# Patient Record
Sex: MALE | Race: WHITE | NOT HISPANIC OR LATINO | Employment: OTHER | URBAN - NONMETROPOLITAN AREA
[De-identification: names, ages, dates, MRNs, and addresses within clinical notes are randomized per-mention and may not be internally consistent; named-entity substitution may affect disease eponyms.]

---

## 2019-03-12 ENCOUNTER — TRANSCRIBE ORDERS (OUTPATIENT)
Dept: ADMINISTRATIVE | Facility: HOSPITAL | Age: 59
End: 2019-03-12

## 2019-03-19 ENCOUNTER — TRANSCRIBE ORDERS (OUTPATIENT)
Dept: ADMINISTRATIVE | Facility: HOSPITAL | Age: 59
End: 2019-03-19

## 2019-03-19 DIAGNOSIS — I25.10 ATHEROSCLEROSIS OF NATIVE CORONARY ARTERY WITHOUT ANGINA PECTORIS, UNSPECIFIED WHETHER NATIVE OR TRANSPLANTED HEART: Primary | ICD-10-CM

## 2019-03-21 ENCOUNTER — HOSPITAL ENCOUNTER (OUTPATIENT)
Dept: CARDIOLOGY | Facility: HOSPITAL | Age: 59
Discharge: HOME | End: 2019-03-21
Admitting: FAMILY MEDICINE

## 2019-03-21 VITALS — SYSTOLIC BLOOD PRESSURE: 143 MMHG | DIASTOLIC BLOOD PRESSURE: 86 MMHG | HEART RATE: 91 BPM

## 2019-03-21 DIAGNOSIS — I25.10 ATHEROSCLEROSIS OF NATIVE CORONARY ARTERY WITHOUT ANGINA PECTORIS, UNSPECIFIED WHETHER NATIVE OR TRANSPLANTED HEART: ICD-10-CM

## 2019-03-21 LAB
MAXIMAL PREDICTED HEART RATE: 163 BPM
STRESS TARGET HR: 139 BPM

## 2019-03-21 PROCEDURE — 25010000002 PERFLUTREN 6.52 MG/ML SUSPENSION: Performed by: INTERNAL MEDICINE

## 2019-03-21 PROCEDURE — 93350 STRESS TTE ONLY: CPT

## 2019-03-21 PROCEDURE — 93017 CV STRESS TEST TRACING ONLY: CPT

## 2019-03-21 PROCEDURE — 93352 ADMIN ECG CONTRAST AGENT: CPT | Performed by: INTERNAL MEDICINE

## 2019-03-21 PROCEDURE — 93350 STRESS TTE ONLY: CPT | Performed by: INTERNAL MEDICINE

## 2019-03-21 PROCEDURE — 93018 CV STRESS TEST I&R ONLY: CPT | Performed by: INTERNAL MEDICINE

## 2019-03-21 RX ORDER — CARVEDILOL 3.12 MG/1
3.12 TABLET ORAL 2 TIMES DAILY WITH MEALS
COMMUNITY
End: 2021-08-13 | Stop reason: HOSPADM

## 2019-03-21 RX ORDER — RAMIPRIL 10 MG/1
10 CAPSULE ORAL DAILY
COMMUNITY
End: 2021-08-13 | Stop reason: HOSPADM

## 2019-03-21 RX ADMIN — PERFLUTREN 8.48 MG: 6.52 INJECTION, SUSPENSION INTRAVENOUS at 08:25

## 2019-11-08 ENCOUNTER — OFFICE VISIT (OUTPATIENT)
Dept: CARDIOLOGY | Facility: CLINIC | Age: 59
End: 2019-11-08

## 2019-11-08 VITALS
SYSTOLIC BLOOD PRESSURE: 124 MMHG | DIASTOLIC BLOOD PRESSURE: 82 MMHG | BODY MASS INDEX: 27.47 KG/M2 | HEART RATE: 62 BPM | HEIGHT: 67 IN | OXYGEN SATURATION: 98 % | WEIGHT: 175 LBS

## 2019-11-08 DIAGNOSIS — I10 ESSENTIAL HYPERTENSION: ICD-10-CM

## 2019-11-08 DIAGNOSIS — I25.10 CORONARY ARTERY DISEASE INVOLVING NATIVE CORONARY ARTERY OF NATIVE HEART WITHOUT ANGINA PECTORIS: Primary | ICD-10-CM

## 2019-11-08 DIAGNOSIS — E78.2 MIXED HYPERLIPIDEMIA: ICD-10-CM

## 2019-11-08 PROCEDURE — 93000 ELECTROCARDIOGRAM COMPLETE: CPT | Performed by: INTERNAL MEDICINE

## 2019-11-08 PROCEDURE — 99214 OFFICE O/P EST MOD 30 MIN: CPT | Performed by: INTERNAL MEDICINE

## 2019-11-08 RX ORDER — ATORVASTATIN CALCIUM 20 MG/1
20 TABLET, FILM COATED ORAL NIGHTLY
COMMUNITY
End: 2020-08-13 | Stop reason: HOSPADM

## 2019-11-08 RX ORDER — FOLIC ACID/MULTIVIT,IRON,MINER 0.4MG-18MG
2 TABLET ORAL DAILY
COMMUNITY

## 2019-11-08 RX ORDER — VIT C/B6/B5/MAGNESIUM/HERB 173 50-5-6-5MG
4 CAPSULE ORAL DAILY
COMMUNITY

## 2019-11-08 RX ORDER — UREA 10 %
1 LOTION (ML) TOPICAL EVERY 24 HOURS
Status: ON HOLD | COMMUNITY
End: 2021-08-11

## 2019-11-08 NOTE — PROGRESS NOTES
Referring Provider: Logan Bryant MD    Reason for Follow-up Visit: preop clearance    Subjective .   Chief Complaint:   Chief Complaint   Patient presents with   • New/Old Patient     Referred by Dr. Ferrera to get surgical clearance for left hip replacement 12/9/19.  pt had a stress test 3/2019.  Last OV 9/3/2008   • Coronary Artery Disease     pt has a history of stents X4.  he is not having any complaints or symptoms.  just needs to be cleared to have surgery.   • Hyperlipidemia     last labs done at pcp LDL is 171 done 6/11/19  pt is on Lipitor 20 mg qd.       History of present illness:  Logan Mayen is a 59 y.o. yo male with history of CAD, s/p SELMA most recently in 2008. He denies any CP or JOSHUA        History  Past Medical History:   Diagnosis Date   • Coronary artery disease    • Hyperlipidemia    • Hypertension    • Myocardial infarction (CMS/HCC)    ,   Past Surgical History:   Procedure Laterality Date   • CARDIAC CATHETERIZATION     • CATARACT EXTRACTION, BILATERAL     • CORONARY STENT PLACEMENT     • PERIPHERAL ARTERIAL STENT GRAFT     ,   Family History   Problem Relation Age of Onset   • Lung disease Mother    • Heart attack Father    • Heart disease Father    • Lung disease Sister    • Rheum arthritis Sister    • Cancer Sister    • Rheum arthritis Sister    • Liver disease Sister    ,   Social History     Tobacco Use   • Smoking status: Never Smoker   • Smokeless tobacco: Never Used   Substance Use Topics   • Alcohol use: Yes     Alcohol/week: 3.6 oz     Types: 6 Cans of beer per week     Comment: 2-3 x a week   • Drug use: Yes     Types: Marijuana     Comment: sometimes   ,     Medications  Current Outpatient Medications   Medication Sig Dispense Refill   • aspirin 81 MG chewable tablet Chew 81 mg Daily.     • atorvastatin (LIPITOR) 20 MG tablet Take 20 mg by mouth Every Night.     • carvedilol (COREG) 3.125 MG tablet Take 3.125 mg by mouth 2 (Two) Times a Day With Meals.     •  "Cholecalciferol (VITAMIN D3) 125 MCG (5000 UT) capsule capsule Take 5,000 Units by mouth Daily.     • folic acid (FOLVITE) 800 MCG tablet Take 1 tablet by mouth Daily.     • Krill Oil 350 MG capsule Take 2 tablets by mouth Daily.     • ramipril (ALTACE) 10 MG capsule Take 10 mg by mouth Daily.     • Sildenafil Citrate (VIAGRA PO) Take 1 tablet by mouth As Needed.     • Turmeric 500 MG capsule Take 2 tablets by mouth Daily.       No current facility-administered medications for this visit.        Allergies:  Patient has no known allergies.    Review of Systems  Review of Systems   HENT: Negative for nosebleeds.    Cardiovascular: Negative for chest pain, claudication, dyspnea on exertion, irregular heartbeat, leg swelling, near-syncope, orthopnea, palpitations, paroxysmal nocturnal dyspnea and syncope.   Respiratory: Negative for cough, hemoptysis and shortness of breath.    Musculoskeletal: Positive for joint pain (left hip pain).   Gastrointestinal: Negative for dysphagia, hematemesis and melena.   Genitourinary: Negative for hematuria.   All other systems reviewed and are negative.      Objective     Physical Exam:  /82   Pulse 62   Ht 170.2 cm (67\")   Wt 79.4 kg (175 lb)   SpO2 98%   BMI 27.41 kg/m²   Physical Exam   Constitutional: He is oriented to person, place, and time. He appears well-nourished. No distress.   HENT:   Head: Normocephalic.   Eyes: No scleral icterus.   Neck: Normal range of motion. Neck supple.   Cardiovascular: Normal rate, regular rhythm and normal heart sounds. Exam reveals no gallop and no friction rub.   No murmur heard.  Pulmonary/Chest: Effort normal and breath sounds normal. No respiratory distress. He has no wheezes. He has no rales.   Abdominal: Soft. Bowel sounds are normal. He exhibits no distension. There is no tenderness.   Musculoskeletal: He exhibits no edema.   Neurological: He is alert and oriented to person, place, and time.   Skin: Skin is warm and dry. He is " not diaphoretic. No erythema.   Psychiatric: He has a normal mood and affect. His behavior is normal.       Results Review:    ECG 12 Lead  Date/Time: 11/8/2019 10:07 AM  Performed by: Logan Jj MD  Authorized by: Logan Jj MD   Comparison: compared with previous ECG   Similar to previous ECG  Rhythm: sinus rhythm  Rate: normal  Conduction: conduction normal  ST Segments: ST segments normal  T inversion: II  QRS axis: normal  Other findings: non-specific ST-T wave changes    Clinical impression: non-specific ECG            Hospital Outpatient Visit on 03/21/2019   Component Date Value Ref Range Status   • Target HR (85%) 03/21/2019 139  bpm Final   • Max. Pred. HR (100%) 03/21/2019 163  bpm Final       Assessment/Plan   Logan was seen today for new/old patient, coronary artery disease and hyperlipidemia.    Diagnoses and all orders for this visit:    Mixed hyperlipidemia, has inadequate response to statin and probably has familial hyperlipidemia. Will start a PCSK9i    Coronary artery disease involving native coronary artery of native heart without angina pectoris, The patient denies chest pain, shortness of breath, dyspnea on exertion, orthopnea, PND, edema. There is no evidence of ongoing ischemia. Had a normal stress test earlier this year. Low risk for surgery. Will need to cont asa during surgery with previous stents      Essential hypertension, good control        Patient's Body mass index is 27.41 kg/m². BMI is within normal parameters. No follow-up required..

## 2019-11-12 ENCOUNTER — DOCUMENTATION (OUTPATIENT)
Dept: CARDIOLOGY | Facility: CLINIC | Age: 59
End: 2019-11-12

## 2019-11-12 NOTE — TELEPHONE ENCOUNTER
I called pt to let him know we faxed the surgical clearance to Dr. Ferrera.  I also talked to him about the PCSK9i (Repatha) that Dr. Jj wants to start because pt is intolerant to the statins and stopped taking the statins. Pt wants to Google the Repatha.  A Rx will be sent to his local pharmacy and a PA has been done.  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  Abhinav Geller, CMA

## 2019-11-21 ENCOUNTER — HOSPITAL ENCOUNTER (OUTPATIENT)
Dept: PREADMISSION TESTING | Age: 59
Discharge: HOME | DRG: 470 | End: 2019-11-25
Payer: COMMERCIAL

## 2019-11-21 VITALS — WEIGHT: 170 LBS | BODY MASS INDEX: 26.68 KG/M2 | HEIGHT: 67 IN

## 2019-11-21 LAB
ABO/RH: NORMAL
ANTIBODY SCREEN: NORMAL

## 2019-11-21 PROCEDURE — 86850 RBC ANTIBODY SCREEN: CPT

## 2019-11-21 PROCEDURE — 86901 BLOOD TYPING SEROLOGIC RH(D): CPT

## 2019-11-21 PROCEDURE — 86900 BLOOD TYPING SEROLOGIC ABO: CPT

## 2019-11-21 RX ORDER — ACETAMINOPHEN 500 MG
1000 TABLET ORAL ONCE
Status: CANCELLED | OUTPATIENT
Start: 2019-12-09

## 2019-11-21 RX ORDER — LANOLIN ALCOHOL/MO/W.PET/CERES
400 CREAM (GRAM) TOPICAL DAILY
COMMUNITY

## 2019-11-21 RX ORDER — DEXAMETHASONE SODIUM PHOSPHATE 10 MG/ML
10 INJECTION, SOLUTION INTRAMUSCULAR; INTRAVENOUS ONCE
Status: CANCELLED | OUTPATIENT
Start: 2019-12-09

## 2019-11-21 RX ORDER — PREGABALIN 75 MG/1
75 CAPSULE ORAL ONCE
Status: CANCELLED | OUTPATIENT
Start: 2019-12-09

## 2019-11-21 RX ORDER — TURMERIC ROOT EXTRACT 500 MG
2 TABLET ORAL DAILY
COMMUNITY

## 2019-11-21 RX ORDER — CELECOXIB 200 MG/1
200 CAPSULE ORAL ONCE
Status: CANCELLED | OUTPATIENT
Start: 2019-12-09

## 2019-11-21 RX ORDER — CARVEDILOL 3.12 MG/1
3.12 TABLET ORAL 2 TIMES DAILY WITH MEALS
COMMUNITY

## 2019-11-21 RX ORDER — OXYCODONE HCL 10 MG/1
10 TABLET, FILM COATED, EXTENDED RELEASE ORAL ONCE
Status: CANCELLED | OUTPATIENT
Start: 2019-12-09

## 2019-11-21 RX ORDER — RAMIPRIL 10 MG/1
10 CAPSULE ORAL DAILY
COMMUNITY

## 2019-11-21 RX ORDER — BIOTIN 5 MG
1 TABLET ORAL DAILY
COMMUNITY

## 2019-11-21 RX ORDER — ATORVASTATIN CALCIUM 20 MG/1
20 TABLET, FILM COATED ORAL NIGHTLY
COMMUNITY

## 2019-12-08 PROBLEM — M16.12 PRIMARY OSTEOARTHRITIS OF LEFT HIP: Status: ACTIVE | Noted: 2019-12-08

## 2019-12-09 ENCOUNTER — ANESTHESIA (OUTPATIENT)
Dept: OPERATING ROOM | Age: 59
DRG: 470 | End: 2019-12-09
Payer: COMMERCIAL

## 2019-12-09 ENCOUNTER — ANESTHESIA EVENT (OUTPATIENT)
Dept: OPERATING ROOM | Age: 59
DRG: 470 | End: 2019-12-09
Payer: COMMERCIAL

## 2019-12-09 ENCOUNTER — APPOINTMENT (OUTPATIENT)
Dept: GENERAL RADIOLOGY | Age: 59
DRG: 470 | End: 2019-12-09
Attending: ORTHOPAEDIC SURGERY
Payer: COMMERCIAL

## 2019-12-09 ENCOUNTER — HOSPITAL ENCOUNTER (INPATIENT)
Age: 59
LOS: 2 days | Discharge: HOME | DRG: 470 | End: 2019-12-11
Attending: ORTHOPAEDIC SURGERY | Admitting: ORTHOPAEDIC SURGERY
Payer: COMMERCIAL

## 2019-12-09 VITALS
OXYGEN SATURATION: 100 % | DIASTOLIC BLOOD PRESSURE: 53 MMHG | SYSTOLIC BLOOD PRESSURE: 97 MMHG | TEMPERATURE: 97.4 F | RESPIRATION RATE: 1 BRPM

## 2019-12-09 DIAGNOSIS — M16.12 PRIMARY OSTEOARTHRITIS OF LEFT HIP: Primary | ICD-10-CM

## 2019-12-09 PROBLEM — M16.9 DEGENERATIVE JOINT DISEASE (DJD) OF HIP: Status: ACTIVE | Noted: 2019-12-09

## 2019-12-09 LAB
ABO/RH: NORMAL
ANTIBODY SCREEN: NORMAL

## 2019-12-09 PROCEDURE — 2500000003 HC RX 250 WO HCPCS: Performed by: ORTHOPAEDIC SURGERY

## 2019-12-09 PROCEDURE — 3700000001 HC ADD 15 MINUTES (ANESTHESIA): Performed by: ORTHOPAEDIC SURGERY

## 2019-12-09 PROCEDURE — 86900 BLOOD TYPING SEROLOGIC ABO: CPT

## 2019-12-09 PROCEDURE — 2500000003 HC RX 250 WO HCPCS

## 2019-12-09 PROCEDURE — 3600000005 HC SURGERY LEVEL 5 BASE: Performed by: ORTHOPAEDIC SURGERY

## 2019-12-09 PROCEDURE — 73502 X-RAY EXAM HIP UNI 2-3 VIEWS: CPT

## 2019-12-09 PROCEDURE — 6370000000 HC RX 637 (ALT 250 FOR IP): Performed by: ORTHOPAEDIC SURGERY

## 2019-12-09 PROCEDURE — 2709999900 HC NON-CHARGEABLE SUPPLY: Performed by: ORTHOPAEDIC SURGERY

## 2019-12-09 PROCEDURE — 1210000000 HC MED SURG R&B

## 2019-12-09 PROCEDURE — 6360000002 HC RX W HCPCS

## 2019-12-09 PROCEDURE — 86850 RBC ANTIBODY SCREEN: CPT

## 2019-12-09 PROCEDURE — 0SRB04A REPLACEMENT OF LEFT HIP JOINT WITH CERAMIC ON POLYETHYLENE SYNTHETIC SUBSTITUTE, UNCEMENTED, OPEN APPROACH: ICD-10-PCS | Performed by: ORTHOPAEDIC SURGERY

## 2019-12-09 PROCEDURE — C9290 INJ, BUPIVACAINE LIPOSOME: HCPCS | Performed by: ORTHOPAEDIC SURGERY

## 2019-12-09 PROCEDURE — 6360000002 HC RX W HCPCS: Performed by: ORTHOPAEDIC SURGERY

## 2019-12-09 PROCEDURE — 3700000000 HC ANESTHESIA ATTENDED CARE: Performed by: ORTHOPAEDIC SURGERY

## 2019-12-09 PROCEDURE — 7100000001 HC PACU RECOVERY - ADDTL 15 MIN: Performed by: ORTHOPAEDIC SURGERY

## 2019-12-09 PROCEDURE — C1776 JOINT DEVICE (IMPLANTABLE): HCPCS | Performed by: ORTHOPAEDIC SURGERY

## 2019-12-09 PROCEDURE — C1713 ANCHOR/SCREW BN/BN,TIS/BN: HCPCS | Performed by: ORTHOPAEDIC SURGERY

## 2019-12-09 PROCEDURE — 2580000003 HC RX 258: Performed by: ANESTHESIOLOGY

## 2019-12-09 PROCEDURE — 97530 THERAPEUTIC ACTIVITIES: CPT

## 2019-12-09 PROCEDURE — 97161 PT EVAL LOW COMPLEX 20 MIN: CPT

## 2019-12-09 PROCEDURE — 7100000000 HC PACU RECOVERY - FIRST 15 MIN: Performed by: ORTHOPAEDIC SURGERY

## 2019-12-09 PROCEDURE — 2720000010 HC SURG SUPPLY STERILE: Performed by: ORTHOPAEDIC SURGERY

## 2019-12-09 PROCEDURE — 3209999900 FLUORO FOR SURGICAL PROCEDURES

## 2019-12-09 PROCEDURE — 36415 COLL VENOUS BLD VENIPUNCTURE: CPT

## 2019-12-09 PROCEDURE — 3600000015 HC SURGERY LEVEL 5 ADDTL 15MIN: Performed by: ORTHOPAEDIC SURGERY

## 2019-12-09 PROCEDURE — 86901 BLOOD TYPING SEROLOGIC RH(D): CPT

## 2019-12-09 PROCEDURE — 2580000003 HC RX 258: Performed by: ORTHOPAEDIC SURGERY

## 2019-12-09 DEVICE — SCREW BNE L30MM DIA6.5MM CANC HIP S STL GRIPTION FULL THRD: Type: IMPLANTABLE DEVICE | Site: HIP | Status: FUNCTIONAL

## 2019-12-09 DEVICE — CUP ACET DIA54MM 12 H HIP TI GRIPTION VIP TAPR DOME REV: Type: IMPLANTABLE DEVICE | Site: HIP | Status: FUNCTIONAL

## 2019-12-09 DEVICE — STEM FEM SZ 6 L107MM 12/14 TAPR STD OFFSET HIP DUOFIX CLLRD: Type: IMPLANTABLE DEVICE | Site: HIP | Status: FUNCTIONAL

## 2019-12-09 DEVICE — HEAD FEM DIA36MM +8MM OFFSET 12/14 TAPR HIP CERAMIC BIOLOX: Type: IMPLANTABLE DEVICE | Site: HIP | Status: FUNCTIONAL

## 2019-12-09 DEVICE — LINER ACET OD54MM ID36MM HIP ALTRX PINN: Type: IMPLANTABLE DEVICE | Site: HIP | Status: FUNCTIONAL

## 2019-12-09 RX ORDER — FENTANYL CITRATE 50 UG/ML
25 INJECTION, SOLUTION INTRAMUSCULAR; INTRAVENOUS
Status: DISCONTINUED | OUTPATIENT
Start: 2019-12-09 | End: 2019-12-09 | Stop reason: HOSPADM

## 2019-12-09 RX ORDER — MIDAZOLAM HYDROCHLORIDE 1 MG/ML
2 INJECTION INTRAMUSCULAR; INTRAVENOUS
Status: DISCONTINUED | OUTPATIENT
Start: 2019-12-09 | End: 2019-12-09 | Stop reason: HOSPADM

## 2019-12-09 RX ORDER — ENALAPRILAT 2.5 MG/2ML
1.25 INJECTION INTRAVENOUS
Status: DISCONTINUED | OUTPATIENT
Start: 2019-12-09 | End: 2019-12-09 | Stop reason: HOSPADM

## 2019-12-09 RX ORDER — SODIUM CHLORIDE 0.9 % (FLUSH) 0.9 %
10 SYRINGE (ML) INJECTION EVERY 12 HOURS SCHEDULED
Status: DISCONTINUED | OUTPATIENT
Start: 2019-12-09 | End: 2019-12-11 | Stop reason: HOSPADM

## 2019-12-09 RX ORDER — ASPIRIN 81 MG/1
81 TABLET, CHEWABLE ORAL NIGHTLY
Status: DISCONTINUED | OUTPATIENT
Start: 2019-12-09 | End: 2019-12-11 | Stop reason: HOSPADM

## 2019-12-09 RX ORDER — METOCLOPRAMIDE HYDROCHLORIDE 5 MG/ML
10 INJECTION INTRAMUSCULAR; INTRAVENOUS
Status: DISCONTINUED | OUTPATIENT
Start: 2019-12-09 | End: 2019-12-09 | Stop reason: HOSPADM

## 2019-12-09 RX ORDER — ONDANSETRON 2 MG/ML
4 INJECTION INTRAMUSCULAR; INTRAVENOUS EVERY 6 HOURS PRN
Status: DISCONTINUED | OUTPATIENT
Start: 2019-12-09 | End: 2019-12-11 | Stop reason: HOSPADM

## 2019-12-09 RX ORDER — OXYCODONE HCL 10 MG/1
10 TABLET, FILM COATED, EXTENDED RELEASE ORAL EVERY 12 HOURS SCHEDULED
Status: DISCONTINUED | OUTPATIENT
Start: 2019-12-09 | End: 2019-12-11 | Stop reason: HOSPADM

## 2019-12-09 RX ORDER — HYDRALAZINE HYDROCHLORIDE 20 MG/ML
5 INJECTION INTRAMUSCULAR; INTRAVENOUS EVERY 10 MIN PRN
Status: DISCONTINUED | OUTPATIENT
Start: 2019-12-09 | End: 2019-12-09 | Stop reason: HOSPADM

## 2019-12-09 RX ORDER — LABETALOL 20 MG/4 ML (5 MG/ML) INTRAVENOUS SYRINGE
5 EVERY 10 MIN PRN
Status: DISCONTINUED | OUTPATIENT
Start: 2019-12-09 | End: 2019-12-09 | Stop reason: HOSPADM

## 2019-12-09 RX ORDER — SODIUM CHLORIDE, SODIUM LACTATE, POTASSIUM CHLORIDE, CALCIUM CHLORIDE 600; 310; 30; 20 MG/100ML; MG/100ML; MG/100ML; MG/100ML
INJECTION, SOLUTION INTRAVENOUS CONTINUOUS
Status: DISCONTINUED | OUTPATIENT
Start: 2019-12-09 | End: 2019-12-11 | Stop reason: HOSPADM

## 2019-12-09 RX ORDER — PREGABALIN 75 MG/1
75 CAPSULE ORAL ONCE
Status: COMPLETED | OUTPATIENT
Start: 2019-12-09 | End: 2019-12-09

## 2019-12-09 RX ORDER — SODIUM CHLORIDE 0.9 % (FLUSH) 0.9 %
10 SYRINGE (ML) INJECTION PRN
Status: DISCONTINUED | OUTPATIENT
Start: 2019-12-09 | End: 2019-12-11 | Stop reason: HOSPADM

## 2019-12-09 RX ORDER — DOCUSATE SODIUM 100 MG/1
100 CAPSULE, LIQUID FILLED ORAL 2 TIMES DAILY
Status: DISCONTINUED | OUTPATIENT
Start: 2019-12-09 | End: 2019-12-11 | Stop reason: HOSPADM

## 2019-12-09 RX ORDER — ACETAMINOPHEN 500 MG
1000 TABLET ORAL ONCE
Status: COMPLETED | OUTPATIENT
Start: 2019-12-09 | End: 2019-12-09

## 2019-12-09 RX ORDER — SODIUM CHLORIDE 0.9 % (FLUSH) 0.9 %
10 SYRINGE (ML) INJECTION EVERY 12 HOURS SCHEDULED
Status: DISCONTINUED | OUTPATIENT
Start: 2019-12-09 | End: 2019-12-09 | Stop reason: HOSPADM

## 2019-12-09 RX ORDER — CARVEDILOL 3.12 MG/1
3.12 TABLET ORAL 2 TIMES DAILY WITH MEALS
Status: DISCONTINUED | OUTPATIENT
Start: 2019-12-09 | End: 2019-12-11 | Stop reason: HOSPADM

## 2019-12-09 RX ORDER — MORPHINE SULFATE 4 MG/ML
2 INJECTION, SOLUTION INTRAMUSCULAR; INTRAVENOUS EVERY 5 MIN PRN
Status: DISCONTINUED | OUTPATIENT
Start: 2019-12-09 | End: 2019-12-09 | Stop reason: HOSPADM

## 2019-12-09 RX ORDER — DEXAMETHASONE SODIUM PHOSPHATE 10 MG/ML
INJECTION INTRAMUSCULAR; INTRAVENOUS PRN
Status: DISCONTINUED | OUTPATIENT
Start: 2019-12-09 | End: 2019-12-09 | Stop reason: SDUPTHER

## 2019-12-09 RX ORDER — MIDAZOLAM HYDROCHLORIDE 1 MG/ML
INJECTION INTRAMUSCULAR; INTRAVENOUS PRN
Status: DISCONTINUED | OUTPATIENT
Start: 2019-12-09 | End: 2019-12-09 | Stop reason: SDUPTHER

## 2019-12-09 RX ORDER — TAMSULOSIN HYDROCHLORIDE 0.4 MG/1
0.4 CAPSULE ORAL DAILY
Status: DISCONTINUED | OUTPATIENT
Start: 2019-12-09 | End: 2019-12-11 | Stop reason: HOSPADM

## 2019-12-09 RX ORDER — EPHEDRINE SULFATE 50 MG/ML
INJECTION, SOLUTION INTRAVENOUS PRN
Status: DISCONTINUED | OUTPATIENT
Start: 2019-12-09 | End: 2019-12-09 | Stop reason: SDUPTHER

## 2019-12-09 RX ORDER — DIAZEPAM 5 MG/1
5 TABLET ORAL EVERY 6 HOURS PRN
Status: DISCONTINUED | OUTPATIENT
Start: 2019-12-09 | End: 2019-12-11 | Stop reason: HOSPADM

## 2019-12-09 RX ORDER — PROPOFOL 10 MG/ML
INJECTION, EMULSION INTRAVENOUS CONTINUOUS PRN
Status: DISCONTINUED | OUTPATIENT
Start: 2019-12-09 | End: 2019-12-09 | Stop reason: SDUPTHER

## 2019-12-09 RX ORDER — ACETAMINOPHEN 325 MG/1
650 TABLET ORAL EVERY 6 HOURS
Status: DISCONTINUED | OUTPATIENT
Start: 2019-12-09 | End: 2019-12-11 | Stop reason: HOSPADM

## 2019-12-09 RX ORDER — MEPERIDINE HYDROCHLORIDE 50 MG/ML
12.5 INJECTION INTRAMUSCULAR; INTRAVENOUS; SUBCUTANEOUS EVERY 5 MIN PRN
Status: DISCONTINUED | OUTPATIENT
Start: 2019-12-09 | End: 2019-12-09 | Stop reason: HOSPADM

## 2019-12-09 RX ORDER — OXYCODONE HYDROCHLORIDE 5 MG/1
10 TABLET ORAL EVERY 4 HOURS PRN
Status: DISCONTINUED | OUTPATIENT
Start: 2019-12-09 | End: 2019-12-11 | Stop reason: HOSPADM

## 2019-12-09 RX ORDER — LIDOCAINE HYDROCHLORIDE 10 MG/ML
1 INJECTION, SOLUTION EPIDURAL; INFILTRATION; INTRACAUDAL; PERINEURAL
Status: DISCONTINUED | OUTPATIENT
Start: 2019-12-09 | End: 2019-12-09 | Stop reason: HOSPADM

## 2019-12-09 RX ORDER — CELECOXIB 200 MG/1
200 CAPSULE ORAL ONCE
Status: COMPLETED | OUTPATIENT
Start: 2019-12-09 | End: 2019-12-09

## 2019-12-09 RX ORDER — SODIUM CHLORIDE 9 MG/ML
INJECTION, SOLUTION INTRAVENOUS CONTINUOUS
Status: DISCONTINUED | OUTPATIENT
Start: 2019-12-09 | End: 2019-12-11 | Stop reason: HOSPADM

## 2019-12-09 RX ORDER — 0.9 % SODIUM CHLORIDE 0.9 %
500 INTRAVENOUS SOLUTION INTRAVENOUS PRN
Status: COMPLETED | OUTPATIENT
Start: 2019-12-09 | End: 2019-12-10

## 2019-12-09 RX ORDER — LIDOCAINE HYDROCHLORIDE 10 MG/ML
INJECTION, SOLUTION EPIDURAL; INFILTRATION; INTRACAUDAL; PERINEURAL PRN
Status: DISCONTINUED | OUTPATIENT
Start: 2019-12-09 | End: 2019-12-09 | Stop reason: SDUPTHER

## 2019-12-09 RX ORDER — BUPIVACAINE HYDROCHLORIDE 7.5 MG/ML
INJECTION, SOLUTION INTRASPINAL PRN
Status: DISCONTINUED | OUTPATIENT
Start: 2019-12-09 | End: 2019-12-09 | Stop reason: SDUPTHER

## 2019-12-09 RX ORDER — PROMETHAZINE HYDROCHLORIDE 25 MG/ML
6.25 INJECTION, SOLUTION INTRAMUSCULAR; INTRAVENOUS
Status: DISCONTINUED | OUTPATIENT
Start: 2019-12-09 | End: 2019-12-09 | Stop reason: HOSPADM

## 2019-12-09 RX ORDER — OXYCODONE HYDROCHLORIDE 5 MG/1
20 TABLET ORAL EVERY 4 HOURS PRN
Status: DISCONTINUED | OUTPATIENT
Start: 2019-12-09 | End: 2019-12-11 | Stop reason: HOSPADM

## 2019-12-09 RX ORDER — ATORVASTATIN CALCIUM 20 MG/1
20 TABLET, FILM COATED ORAL NIGHTLY
Status: DISCONTINUED | OUTPATIENT
Start: 2019-12-09 | End: 2019-12-11 | Stop reason: HOSPADM

## 2019-12-09 RX ORDER — FENTANYL CITRATE 50 UG/ML
INJECTION, SOLUTION INTRAMUSCULAR; INTRAVENOUS PRN
Status: DISCONTINUED | OUTPATIENT
Start: 2019-12-09 | End: 2019-12-09 | Stop reason: SDUPTHER

## 2019-12-09 RX ORDER — TRAMADOL HYDROCHLORIDE 50 MG/1
100 TABLET ORAL EVERY 6 HOURS
Status: DISCONTINUED | OUTPATIENT
Start: 2019-12-09 | End: 2019-12-11 | Stop reason: HOSPADM

## 2019-12-09 RX ORDER — TRANEXAMIC ACID 100 MG/ML
INJECTION, SOLUTION INTRAVENOUS PRN
Status: DISCONTINUED | OUTPATIENT
Start: 2019-12-09 | End: 2019-12-09 | Stop reason: SDUPTHER

## 2019-12-09 RX ORDER — DEXAMETHASONE SODIUM PHOSPHATE 10 MG/ML
10 INJECTION, SOLUTION INTRAMUSCULAR; INTRAVENOUS ONCE
Status: DISCONTINUED | OUTPATIENT
Start: 2019-12-09 | End: 2019-12-09 | Stop reason: HOSPADM

## 2019-12-09 RX ORDER — DIPHENHYDRAMINE HYDROCHLORIDE 50 MG/ML
12.5 INJECTION INTRAMUSCULAR; INTRAVENOUS
Status: DISCONTINUED | OUTPATIENT
Start: 2019-12-09 | End: 2019-12-09 | Stop reason: HOSPADM

## 2019-12-09 RX ORDER — BUPIVACAINE HYDROCHLORIDE 2.5 MG/ML
INJECTION, SOLUTION INFILTRATION; PERINEURAL PRN
Status: DISCONTINUED | OUTPATIENT
Start: 2019-12-09 | End: 2019-12-09 | Stop reason: HOSPADM

## 2019-12-09 RX ORDER — DIPHENHYDRAMINE HCL 25 MG
25 TABLET ORAL EVERY 6 HOURS PRN
Status: DISCONTINUED | OUTPATIENT
Start: 2019-12-09 | End: 2019-12-11 | Stop reason: HOSPADM

## 2019-12-09 RX ORDER — MORPHINE SULFATE 4 MG/ML
4 INJECTION, SOLUTION INTRAMUSCULAR; INTRAVENOUS EVERY 5 MIN PRN
Status: DISCONTINUED | OUTPATIENT
Start: 2019-12-09 | End: 2019-12-09 | Stop reason: HOSPADM

## 2019-12-09 RX ORDER — OXYCODONE HCL 10 MG/1
10 TABLET, FILM COATED, EXTENDED RELEASE ORAL ONCE
Status: COMPLETED | OUTPATIENT
Start: 2019-12-09 | End: 2019-12-09

## 2019-12-09 RX ORDER — FENTANYL CITRATE 50 UG/ML
50 INJECTION, SOLUTION INTRAMUSCULAR; INTRAVENOUS
Status: DISCONTINUED | OUTPATIENT
Start: 2019-12-09 | End: 2019-12-09 | Stop reason: HOSPADM

## 2019-12-09 RX ORDER — SODIUM CHLORIDE, SODIUM LACTATE, POTASSIUM CHLORIDE, CALCIUM CHLORIDE 600; 310; 30; 20 MG/100ML; MG/100ML; MG/100ML; MG/100ML
INJECTION, SOLUTION INTRAVENOUS CONTINUOUS
Status: DISCONTINUED | OUTPATIENT
Start: 2019-12-09 | End: 2019-12-09 | Stop reason: SDUPTHER

## 2019-12-09 RX ORDER — SODIUM CHLORIDE 0.9 % (FLUSH) 0.9 %
10 SYRINGE (ML) INJECTION PRN
Status: DISCONTINUED | OUTPATIENT
Start: 2019-12-09 | End: 2019-12-09 | Stop reason: HOSPADM

## 2019-12-09 RX ORDER — LANOLIN ALCOHOL/MO/W.PET/CERES
400 CREAM (GRAM) TOPICAL DAILY
Status: DISCONTINUED | OUTPATIENT
Start: 2019-12-09 | End: 2019-12-11 | Stop reason: HOSPADM

## 2019-12-09 RX ORDER — SENNA AND DOCUSATE SODIUM 50; 8.6 MG/1; MG/1
1 TABLET, FILM COATED ORAL 2 TIMES DAILY
Status: DISCONTINUED | OUTPATIENT
Start: 2019-12-09 | End: 2019-12-11 | Stop reason: HOSPADM

## 2019-12-09 RX ORDER — ONDANSETRON 2 MG/ML
INJECTION INTRAMUSCULAR; INTRAVENOUS PRN
Status: DISCONTINUED | OUTPATIENT
Start: 2019-12-09 | End: 2019-12-09 | Stop reason: SDUPTHER

## 2019-12-09 RX ORDER — CLINDAMYCIN PHOSPHATE 900 MG/50ML
900 INJECTION INTRAVENOUS EVERY 8 HOURS
Status: COMPLETED | OUTPATIENT
Start: 2019-12-09 | End: 2019-12-11

## 2019-12-09 RX ADMIN — FENTANYL CITRATE 50 MCG: 50 INJECTION INTRAMUSCULAR; INTRAVENOUS at 12:01

## 2019-12-09 RX ADMIN — CARVEDILOL 3.12 MG: 3.12 TABLET, FILM COATED ORAL at 18:33

## 2019-12-09 RX ADMIN — OXYCODONE HYDROCHLORIDE 10 MG: 10 TABLET, FILM COATED, EXTENDED RELEASE ORAL at 10:45

## 2019-12-09 RX ADMIN — OXYCODONE HYDROCHLORIDE 10 MG: 10 TABLET, FILM COATED, EXTENDED RELEASE ORAL at 21:13

## 2019-12-09 RX ADMIN — Medication 10 ML: at 21:14

## 2019-12-09 RX ADMIN — DEXAMETHASONE SODIUM PHOSPHATE 10 MG: 10 INJECTION INTRAMUSCULAR; INTRAVENOUS at 12:16

## 2019-12-09 RX ADMIN — TRANEXAMIC ACID 1000 MG: 100 INJECTION, SOLUTION INTRAVENOUS at 12:03

## 2019-12-09 RX ADMIN — ACETAMINOPHEN 1000 MG: 500 TABLET, FILM COATED ORAL at 10:44

## 2019-12-09 RX ADMIN — CELECOXIB 200 MG: 200 CAPSULE ORAL at 10:45

## 2019-12-09 RX ADMIN — BUPIVACAINE HYDROCHLORIDE IN DEXTROSE 2 ML: 7.5 INJECTION, SOLUTION SUBARACHNOID at 11:55

## 2019-12-09 RX ADMIN — FENTANYL CITRATE 50 MCG: 50 INJECTION INTRAMUSCULAR; INTRAVENOUS at 13:00

## 2019-12-09 RX ADMIN — ASPIRIN 81 MG 81 MG: 81 TABLET ORAL at 21:13

## 2019-12-09 RX ADMIN — ACETAMINOPHEN 650 MG: 325 TABLET ORAL at 21:13

## 2019-12-09 RX ADMIN — ATORVASTATIN CALCIUM 20 MG: 20 TABLET, FILM COATED ORAL at 21:13

## 2019-12-09 RX ADMIN — SODIUM CHLORIDE, SODIUM LACTATE, POTASSIUM CHLORIDE, AND CALCIUM CHLORIDE: 600; 310; 30; 20 INJECTION, SOLUTION INTRAVENOUS at 10:45

## 2019-12-09 RX ADMIN — RIVAROXABAN 10 MG: 10 TABLET, FILM COATED ORAL at 23:24

## 2019-12-09 RX ADMIN — TRAMADOL HYDROCHLORIDE 100 MG: 50 TABLET, FILM COATED ORAL at 15:30

## 2019-12-09 RX ADMIN — Medication 2 G: at 19:33

## 2019-12-09 RX ADMIN — MIDAZOLAM 2 MG: 1 INJECTION INTRAMUSCULAR; INTRAVENOUS at 11:57

## 2019-12-09 RX ADMIN — FENTANYL CITRATE 100 MCG: 50 INJECTION INTRAMUSCULAR; INTRAVENOUS at 13:45

## 2019-12-09 RX ADMIN — TRAMADOL HYDROCHLORIDE 100 MG: 50 TABLET, FILM COATED ORAL at 21:13

## 2019-12-09 RX ADMIN — PROPOFOL 100 MCG/KG/MIN: 10 INJECTION, EMULSION INTRAVENOUS at 11:59

## 2019-12-09 RX ADMIN — DOCUSATE SODIUM 100 MG: 100 CAPSULE, LIQUID FILLED ORAL at 21:13

## 2019-12-09 RX ADMIN — PREGABALIN 75 MG: 75 CAPSULE ORAL at 10:44

## 2019-12-09 RX ADMIN — ONDANSETRON HYDROCHLORIDE 8 MG: 2 INJECTION, SOLUTION INTRAMUSCULAR; INTRAVENOUS at 11:42

## 2019-12-09 RX ADMIN — FENTANYL CITRATE 50 MCG: 50 INJECTION INTRAMUSCULAR; INTRAVENOUS at 12:15

## 2019-12-09 RX ADMIN — TAMSULOSIN HYDROCHLORIDE 0.4 MG: 0.4 CAPSULE ORAL at 15:30

## 2019-12-09 RX ADMIN — CLINDAMYCIN PHOSPHATE 900 MG: 900 INJECTION, SOLUTION INTRAVENOUS at 15:30

## 2019-12-09 RX ADMIN — SODIUM CHLORIDE: 9 INJECTION, SOLUTION INTRAVENOUS at 19:36

## 2019-12-09 RX ADMIN — SODIUM CHLORIDE, SODIUM LACTATE, POTASSIUM CHLORIDE, AND CALCIUM CHLORIDE: 600; 310; 30; 20 INJECTION, SOLUTION INTRAVENOUS at 12:55

## 2019-12-09 RX ADMIN — LIDOCAINE HYDROCHLORIDE 3 ML: 10 INJECTION, SOLUTION EPIDURAL; INFILTRATION; INTRACAUDAL; PERINEURAL at 11:53

## 2019-12-09 RX ADMIN — Medication 2 G: at 12:03

## 2019-12-09 RX ADMIN — EPHEDRINE SULFATE 10 MG: 50 INJECTION INTRAMUSCULAR; INTRAVENOUS; SUBCUTANEOUS at 12:16

## 2019-12-09 RX ADMIN — SENNOSIDES AND DOCUSATE SODIUM 1 TABLET: 8.6; 5 TABLET ORAL at 21:13

## 2019-12-09 RX ADMIN — ACETAMINOPHEN 650 MG: 325 TABLET ORAL at 15:29

## 2019-12-09 RX ADMIN — DIAZEPAM 5 MG: 5 TABLET ORAL at 21:12

## 2019-12-09 RX ADMIN — CLINDAMYCIN PHOSPHATE 900 MG: 900 INJECTION, SOLUTION INTRAVENOUS at 23:24

## 2019-12-09 ASSESSMENT — PAIN SCALES - GENERAL
PAINLEVEL_OUTOF10: 2

## 2019-12-09 ASSESSMENT — PAIN DESCRIPTION - FREQUENCY: FREQUENCY: INTERMITTENT

## 2019-12-09 ASSESSMENT — PAIN DESCRIPTION - ORIENTATION: ORIENTATION: LEFT

## 2019-12-09 ASSESSMENT — PAIN DESCRIPTION - PROGRESSION: CLINICAL_PROGRESSION: NOT CHANGED

## 2019-12-09 ASSESSMENT — PAIN DESCRIPTION - LOCATION: LOCATION: HIP

## 2019-12-09 ASSESSMENT — PAIN DESCRIPTION - DESCRIPTORS: DESCRIPTORS: DULL

## 2019-12-09 ASSESSMENT — PAIN - FUNCTIONAL ASSESSMENT: PAIN_FUNCTIONAL_ASSESSMENT: ACTIVITIES ARE NOT PREVENTED

## 2019-12-09 ASSESSMENT — PAIN DESCRIPTION - PAIN TYPE: TYPE: SURGICAL PAIN

## 2019-12-09 ASSESSMENT — PAIN DESCRIPTION - ONSET: ONSET: GRADUAL

## 2019-12-10 LAB
ANION GAP SERPL CALCULATED.3IONS-SCNC: 12 MMOL/L (ref 7–19)
BUN BLDV-MCNC: 11 MG/DL (ref 6–20)
CALCIUM SERPL-MCNC: 9.3 MG/DL (ref 8.6–10)
CHLORIDE BLD-SCNC: 102 MMOL/L (ref 98–111)
CO2: 22 MMOL/L (ref 22–29)
CREAT SERPL-MCNC: 0.8 MG/DL (ref 0.5–1.2)
GFR NON-AFRICAN AMERICAN: >60
GLUCOSE BLD-MCNC: 138 MG/DL (ref 74–109)
HCT VFR BLD CALC: 37.3 % (ref 42–52)
HEMOGLOBIN: 12.7 G/DL (ref 14–18)
POTASSIUM REFLEX MAGNESIUM: 4.9 MMOL/L (ref 3.5–5)
SODIUM BLD-SCNC: 136 MMOL/L (ref 136–145)

## 2019-12-10 PROCEDURE — 1210000000 HC MED SURG R&B

## 2019-12-10 PROCEDURE — 2580000003 HC RX 258: Performed by: ORTHOPAEDIC SURGERY

## 2019-12-10 PROCEDURE — 97116 GAIT TRAINING THERAPY: CPT

## 2019-12-10 PROCEDURE — 85018 HEMOGLOBIN: CPT

## 2019-12-10 PROCEDURE — 2500000003 HC RX 250 WO HCPCS: Performed by: ORTHOPAEDIC SURGERY

## 2019-12-10 PROCEDURE — 6360000002 HC RX W HCPCS: Performed by: ORTHOPAEDIC SURGERY

## 2019-12-10 PROCEDURE — 97165 OT EVAL LOW COMPLEX 30 MIN: CPT

## 2019-12-10 PROCEDURE — 80048 BASIC METABOLIC PNL TOTAL CA: CPT

## 2019-12-10 PROCEDURE — 85014 HEMATOCRIT: CPT

## 2019-12-10 PROCEDURE — 36415 COLL VENOUS BLD VENIPUNCTURE: CPT

## 2019-12-10 PROCEDURE — 6370000000 HC RX 637 (ALT 250 FOR IP): Performed by: ORTHOPAEDIC SURGERY

## 2019-12-10 RX ORDER — POLYETHYLENE GLYCOL 3350 17 G/17G
17 POWDER, FOR SOLUTION ORAL DAILY
Status: DISCONTINUED | OUTPATIENT
Start: 2019-12-10 | End: 2019-12-11 | Stop reason: HOSPADM

## 2019-12-10 RX ADMIN — CARVEDILOL 3.12 MG: 3.12 TABLET, FILM COATED ORAL at 18:15

## 2019-12-10 RX ADMIN — ATORVASTATIN CALCIUM 20 MG: 20 TABLET, FILM COATED ORAL at 20:42

## 2019-12-10 RX ADMIN — DOCUSATE SODIUM 100 MG: 100 CAPSULE, LIQUID FILLED ORAL at 08:08

## 2019-12-10 RX ADMIN — DIPHENHYDRAMINE HCL 25 MG: 25 TABLET ORAL at 20:42

## 2019-12-10 RX ADMIN — TRAMADOL HYDROCHLORIDE 100 MG: 50 TABLET, FILM COATED ORAL at 03:03

## 2019-12-10 RX ADMIN — SENNOSIDES AND DOCUSATE SODIUM 1 TABLET: 8.6; 5 TABLET ORAL at 08:11

## 2019-12-10 RX ADMIN — TRAMADOL HYDROCHLORIDE 100 MG: 50 TABLET, FILM COATED ORAL at 20:43

## 2019-12-10 RX ADMIN — DIAZEPAM 5 MG: 5 TABLET ORAL at 20:43

## 2019-12-10 RX ADMIN — ACETAMINOPHEN 650 MG: 325 TABLET ORAL at 03:03

## 2019-12-10 RX ADMIN — TAMSULOSIN HYDROCHLORIDE 0.4 MG: 0.4 CAPSULE ORAL at 08:08

## 2019-12-10 RX ADMIN — CLINDAMYCIN PHOSPHATE 900 MG: 900 INJECTION, SOLUTION INTRAVENOUS at 23:36

## 2019-12-10 RX ADMIN — OXYCODONE HYDROCHLORIDE 10 MG: 10 TABLET, FILM COATED, EXTENDED RELEASE ORAL at 20:43

## 2019-12-10 RX ADMIN — TRAMADOL HYDROCHLORIDE 100 MG: 50 TABLET, FILM COATED ORAL at 15:34

## 2019-12-10 RX ADMIN — RIVAROXABAN 10 MG: 10 TABLET, FILM COATED ORAL at 18:46

## 2019-12-10 RX ADMIN — FOLIC ACID TAB 400 MCG 400 MCG: 400 TAB at 08:07

## 2019-12-10 RX ADMIN — ACETAMINOPHEN 650 MG: 325 TABLET ORAL at 15:34

## 2019-12-10 RX ADMIN — CARVEDILOL 3.12 MG: 3.12 TABLET, FILM COATED ORAL at 08:07

## 2019-12-10 RX ADMIN — OXYCODONE HYDROCHLORIDE 10 MG: 10 TABLET, FILM COATED, EXTENDED RELEASE ORAL at 08:08

## 2019-12-10 RX ADMIN — CLINDAMYCIN PHOSPHATE 900 MG: 900 INJECTION, SOLUTION INTRAVENOUS at 08:10

## 2019-12-10 RX ADMIN — CLINDAMYCIN PHOSPHATE 900 MG: 900 INJECTION, SOLUTION INTRAVENOUS at 15:34

## 2019-12-10 RX ADMIN — Medication 2 G: at 05:32

## 2019-12-10 RX ADMIN — Medication 5000 UNITS: at 08:07

## 2019-12-10 RX ADMIN — ASPIRIN 81 MG 81 MG: 81 TABLET ORAL at 20:42

## 2019-12-10 RX ADMIN — ACETAMINOPHEN 650 MG: 325 TABLET ORAL at 08:08

## 2019-12-10 RX ADMIN — TRAMADOL HYDROCHLORIDE 100 MG: 50 TABLET, FILM COATED ORAL at 08:08

## 2019-12-10 RX ADMIN — SODIUM CHLORIDE 500 ML: 9 INJECTION, SOLUTION INTRAVENOUS at 03:03

## 2019-12-10 RX ADMIN — Medication 10 ML: at 20:43

## 2019-12-10 RX ADMIN — ACETAMINOPHEN 650 MG: 325 TABLET ORAL at 20:43

## 2019-12-10 RX ADMIN — DOCUSATE SODIUM 100 MG: 100 CAPSULE, LIQUID FILLED ORAL at 20:43

## 2019-12-10 RX ADMIN — SENNOSIDES AND DOCUSATE SODIUM 1 TABLET: 8.6; 5 TABLET ORAL at 20:43

## 2019-12-10 ASSESSMENT — PAIN DESCRIPTION - PROGRESSION
CLINICAL_PROGRESSION: NOT CHANGED
CLINICAL_PROGRESSION: NOT CHANGED

## 2019-12-10 ASSESSMENT — PAIN SCALES - GENERAL
PAINLEVEL_OUTOF10: 0
PAINLEVEL_OUTOF10: 4
PAINLEVEL_OUTOF10: 2
PAINLEVEL_OUTOF10: 0
PAINLEVEL_OUTOF10: 3

## 2019-12-10 ASSESSMENT — PAIN DESCRIPTION - FREQUENCY
FREQUENCY: INTERMITTENT
FREQUENCY: INTERMITTENT

## 2019-12-10 ASSESSMENT — PAIN DESCRIPTION - PAIN TYPE
TYPE: SURGICAL PAIN

## 2019-12-10 ASSESSMENT — PAIN DESCRIPTION - ONSET
ONSET: GRADUAL
ONSET: GRADUAL

## 2019-12-10 ASSESSMENT — PAIN - FUNCTIONAL ASSESSMENT
PAIN_FUNCTIONAL_ASSESSMENT: ACTIVITIES ARE NOT PREVENTED
PAIN_FUNCTIONAL_ASSESSMENT: ACTIVITIES ARE NOT PREVENTED

## 2019-12-10 ASSESSMENT — PAIN DESCRIPTION - LOCATION
LOCATION: HIP

## 2019-12-10 ASSESSMENT — PAIN DESCRIPTION - DESCRIPTORS
DESCRIPTORS: DULL
DESCRIPTORS: SORE

## 2019-12-10 ASSESSMENT — PAIN DESCRIPTION - ORIENTATION
ORIENTATION: LEFT

## 2019-12-11 VITALS
HEART RATE: 67 BPM | HEIGHT: 67 IN | DIASTOLIC BLOOD PRESSURE: 77 MMHG | TEMPERATURE: 97.4 F | BODY MASS INDEX: 26.68 KG/M2 | WEIGHT: 170 LBS | SYSTOLIC BLOOD PRESSURE: 127 MMHG | RESPIRATION RATE: 20 BRPM | OXYGEN SATURATION: 95 %

## 2019-12-11 LAB
ANION GAP SERPL CALCULATED.3IONS-SCNC: 12 MMOL/L (ref 7–19)
BUN BLDV-MCNC: 14 MG/DL (ref 6–20)
CALCIUM SERPL-MCNC: 9.1 MG/DL (ref 8.6–10)
CHLORIDE BLD-SCNC: 102 MMOL/L (ref 98–111)
CO2: 24 MMOL/L (ref 22–29)
CREAT SERPL-MCNC: 0.8 MG/DL (ref 0.5–1.2)
GFR NON-AFRICAN AMERICAN: >60
GLUCOSE BLD-MCNC: 103 MG/DL (ref 74–109)
HCT VFR BLD CALC: 34.1 % (ref 42–52)
HEMOGLOBIN: 11.3 G/DL (ref 14–18)
POTASSIUM REFLEX MAGNESIUM: 4.3 MMOL/L (ref 3.5–5)
SODIUM BLD-SCNC: 138 MMOL/L (ref 136–145)

## 2019-12-11 PROCEDURE — 85018 HEMOGLOBIN: CPT

## 2019-12-11 PROCEDURE — 80048 BASIC METABOLIC PNL TOTAL CA: CPT

## 2019-12-11 PROCEDURE — 85014 HEMATOCRIT: CPT

## 2019-12-11 PROCEDURE — 97116 GAIT TRAINING THERAPY: CPT

## 2019-12-11 PROCEDURE — 36415 COLL VENOUS BLD VENIPUNCTURE: CPT

## 2019-12-11 PROCEDURE — 6370000000 HC RX 637 (ALT 250 FOR IP): Performed by: ORTHOPAEDIC SURGERY

## 2019-12-11 PROCEDURE — 97530 THERAPEUTIC ACTIVITIES: CPT

## 2019-12-11 PROCEDURE — 2500000003 HC RX 250 WO HCPCS: Performed by: ORTHOPAEDIC SURGERY

## 2019-12-11 RX ORDER — OXYCODONE HYDROCHLORIDE 5 MG/1
5 TABLET ORAL EVERY 4 HOURS PRN
Qty: 60 TABLET | Refills: 0
Start: 2019-12-11 | End: 2020-01-10

## 2019-12-11 RX ADMIN — TRAMADOL HYDROCHLORIDE 100 MG: 50 TABLET, FILM COATED ORAL at 08:01

## 2019-12-11 RX ADMIN — DOCUSATE SODIUM 100 MG: 100 CAPSULE, LIQUID FILLED ORAL at 08:01

## 2019-12-11 RX ADMIN — TAMSULOSIN HYDROCHLORIDE 0.4 MG: 0.4 CAPSULE ORAL at 08:01

## 2019-12-11 RX ADMIN — FOLIC ACID TAB 400 MCG 400 MCG: 400 TAB at 08:01

## 2019-12-11 RX ADMIN — Medication 5000 UNITS: at 08:01

## 2019-12-11 RX ADMIN — OXYCODONE HYDROCHLORIDE 10 MG: 10 TABLET, FILM COATED, EXTENDED RELEASE ORAL at 08:00

## 2019-12-11 RX ADMIN — SENNOSIDES AND DOCUSATE SODIUM 1 TABLET: 8.6; 5 TABLET ORAL at 08:00

## 2019-12-11 RX ADMIN — ACETAMINOPHEN 650 MG: 325 TABLET ORAL at 04:08

## 2019-12-11 RX ADMIN — CLINDAMYCIN PHOSPHATE 900 MG: 900 INJECTION, SOLUTION INTRAVENOUS at 08:01

## 2019-12-11 RX ADMIN — TRAMADOL HYDROCHLORIDE 100 MG: 50 TABLET, FILM COATED ORAL at 04:07

## 2019-12-11 RX ADMIN — POLYETHYLENE GLYCOL 3350 17 G: 17 POWDER, FOR SOLUTION ORAL at 08:01

## 2019-12-11 RX ADMIN — ACETAMINOPHEN 650 MG: 325 TABLET ORAL at 08:00

## 2019-12-11 RX ADMIN — CARVEDILOL 3.12 MG: 3.12 TABLET, FILM COATED ORAL at 08:01

## 2019-12-11 ASSESSMENT — PAIN DESCRIPTION - PROGRESSION
CLINICAL_PROGRESSION: RAPIDLY IMPROVING
CLINICAL_PROGRESSION: RAPIDLY IMPROVING
CLINICAL_PROGRESSION: GRADUALLY IMPROVING

## 2019-12-11 ASSESSMENT — PAIN DESCRIPTION - ONSET
ONSET: ON-GOING
ONSET: GRADUAL
ONSET: GRADUAL

## 2019-12-11 ASSESSMENT — PAIN DESCRIPTION - ORIENTATION
ORIENTATION: LEFT

## 2019-12-11 ASSESSMENT — PAIN SCALES - GENERAL
PAINLEVEL_OUTOF10: 5
PAINLEVEL_OUTOF10: 3
PAINLEVEL_OUTOF10: 5
PAINLEVEL_OUTOF10: 5

## 2019-12-11 ASSESSMENT — PAIN DESCRIPTION - LOCATION
LOCATION: HIP

## 2019-12-11 ASSESSMENT — PAIN DESCRIPTION - FREQUENCY
FREQUENCY: INTERMITTENT

## 2019-12-11 ASSESSMENT — PAIN - FUNCTIONAL ASSESSMENT
PAIN_FUNCTIONAL_ASSESSMENT: ACTIVITIES ARE NOT PREVENTED

## 2019-12-11 ASSESSMENT — PAIN DESCRIPTION - PAIN TYPE
TYPE: SURGICAL PAIN

## 2019-12-11 ASSESSMENT — PAIN DESCRIPTION - DIRECTION: RADIATING_TOWARDS: NO

## 2019-12-11 ASSESSMENT — PAIN DESCRIPTION - DESCRIPTORS
DESCRIPTORS: ACHING
DESCRIPTORS: SORE
DESCRIPTORS: SORE

## 2019-12-19 ENCOUNTER — TELEPHONE (OUTPATIENT)
Dept: INPATIENT UNIT | Age: 59
End: 2019-12-19

## 2020-08-13 ENCOUNTER — HOSPITAL ENCOUNTER (EMERGENCY)
Facility: HOSPITAL | Age: 60
Discharge: HOME | End: 2020-08-13
Admitting: EMERGENCY MEDICINE

## 2020-08-13 ENCOUNTER — APPOINTMENT (OUTPATIENT)
Dept: CT IMAGING | Facility: HOSPITAL | Age: 60
End: 2020-08-13

## 2020-08-13 VITALS
SYSTOLIC BLOOD PRESSURE: 138 MMHG | WEIGHT: 170 LBS | DIASTOLIC BLOOD PRESSURE: 80 MMHG | HEIGHT: 67 IN | RESPIRATION RATE: 16 BRPM | BODY MASS INDEX: 26.68 KG/M2 | TEMPERATURE: 98.2 F | OXYGEN SATURATION: 95 % | HEART RATE: 80 BPM

## 2020-08-13 DIAGNOSIS — R10.13 EPIGASTRIC PAIN: Primary | ICD-10-CM

## 2020-08-13 LAB
ALBUMIN SERPL-MCNC: 4.2 G/DL (ref 3.5–5.2)
ALBUMIN/GLOB SERPL: 1.4 G/DL
ALP SERPL-CCNC: 68 U/L (ref 39–117)
ALT SERPL W P-5'-P-CCNC: 14 U/L (ref 1–41)
ANION GAP SERPL CALCULATED.3IONS-SCNC: 13 MMOL/L (ref 5–15)
APTT PPP: 30 SECONDS (ref 24.1–35)
AST SERPL-CCNC: 13 U/L (ref 1–40)
BACTERIA UR QL AUTO: ABNORMAL /HPF
BASOPHILS # BLD AUTO: 0.03 10*3/MM3 (ref 0–0.2)
BASOPHILS NFR BLD AUTO: 0.3 % (ref 0–1.5)
BILIRUB SERPL-MCNC: 1 MG/DL (ref 0–1.2)
BILIRUB UR QL STRIP: NEGATIVE
BUN SERPL-MCNC: 13 MG/DL (ref 6–20)
BUN/CREAT SERPL: 15.7 (ref 7–25)
CALCIUM SPEC-SCNC: 9.8 MG/DL (ref 8.6–10.5)
CHLORIDE SERPL-SCNC: 100 MMOL/L (ref 98–107)
CLARITY UR: CLEAR
CO2 SERPL-SCNC: 25 MMOL/L (ref 22–29)
COLOR UR: ABNORMAL
CREAT SERPL-MCNC: 0.83 MG/DL (ref 0.76–1.27)
DEPRECATED RDW RBC AUTO: 40.4 FL (ref 37–54)
EOSINOPHIL # BLD AUTO: 0.02 10*3/MM3 (ref 0–0.4)
EOSINOPHIL NFR BLD AUTO: 0.2 % (ref 0.3–6.2)
ERYTHROCYTE [DISTWIDTH] IN BLOOD BY AUTOMATED COUNT: 12.3 % (ref 12.3–15.4)
GFR SERPL CREATININE-BSD FRML MDRD: 95 ML/MIN/1.73
GLOBULIN UR ELPH-MCNC: 3.1 GM/DL
GLUCOSE SERPL-MCNC: 138 MG/DL (ref 65–99)
GLUCOSE UR STRIP-MCNC: NEGATIVE MG/DL
HCT VFR BLD AUTO: 45.8 % (ref 37.5–51)
HGB BLD-MCNC: 15.8 G/DL (ref 13–17.7)
HGB UR QL STRIP.AUTO: ABNORMAL
HOLD SPECIMEN: NORMAL
HOLD SPECIMEN: NORMAL
HYALINE CASTS UR QL AUTO: ABNORMAL /LPF
IMM GRANULOCYTES # BLD AUTO: 0.05 10*3/MM3 (ref 0–0.05)
IMM GRANULOCYTES NFR BLD AUTO: 0.4 % (ref 0–0.5)
INR PPP: 1.24 (ref 0.91–1.09)
KETONES UR QL STRIP: NEGATIVE
LEUKOCYTE ESTERASE UR QL STRIP.AUTO: NEGATIVE
LIPASE SERPL-CCNC: 17 U/L (ref 13–60)
LYMPHOCYTES # BLD AUTO: 0.8 10*3/MM3 (ref 0.7–3.1)
LYMPHOCYTES NFR BLD AUTO: 6.9 % (ref 19.6–45.3)
MCH RBC QN AUTO: 31.3 PG (ref 26.6–33)
MCHC RBC AUTO-ENTMCNC: 34.5 G/DL (ref 31.5–35.7)
MCV RBC AUTO: 90.9 FL (ref 79–97)
MONOCYTES # BLD AUTO: 0.4 10*3/MM3 (ref 0.1–0.9)
MONOCYTES NFR BLD AUTO: 3.4 % (ref 5–12)
NEUTROPHILS NFR BLD AUTO: 10.36 10*3/MM3 (ref 1.7–7)
NEUTROPHILS NFR BLD AUTO: 88.8 % (ref 42.7–76)
NITRITE UR QL STRIP: NEGATIVE
NRBC BLD AUTO-RTO: 0 /100 WBC (ref 0–0.2)
PH UR STRIP.AUTO: 6 [PH] (ref 5–8)
PLATELET # BLD AUTO: 197 10*3/MM3 (ref 140–450)
PMV BLD AUTO: 9.4 FL (ref 6–12)
POTASSIUM SERPL-SCNC: 3.9 MMOL/L (ref 3.5–5.2)
PROT SERPL-MCNC: 7.3 G/DL (ref 6–8.5)
PROT UR QL STRIP: ABNORMAL
PROTHROMBIN TIME: 15.2 SECONDS (ref 11.9–14.6)
RBC # BLD AUTO: 5.04 10*6/MM3 (ref 4.14–5.8)
RBC # UR: ABNORMAL /HPF
REF LAB TEST METHOD: ABNORMAL
SODIUM SERPL-SCNC: 138 MMOL/L (ref 136–145)
SP GR UR STRIP: 1.02 (ref 1–1.03)
SQUAMOUS #/AREA URNS HPF: ABNORMAL /HPF
UROBILINOGEN UR QL STRIP: ABNORMAL
WBC # BLD AUTO: 11.66 10*3/MM3 (ref 3.4–10.8)
WBC UR QL AUTO: ABNORMAL /HPF
WHOLE BLOOD HOLD SPECIMEN: NORMAL
WHOLE BLOOD HOLD SPECIMEN: NORMAL

## 2020-08-13 PROCEDURE — 74177 CT ABD & PELVIS W/CONTRAST: CPT

## 2020-08-13 PROCEDURE — 85730 THROMBOPLASTIN TIME PARTIAL: CPT | Performed by: NURSE PRACTITIONER

## 2020-08-13 PROCEDURE — 80053 COMPREHEN METABOLIC PANEL: CPT

## 2020-08-13 PROCEDURE — 81001 URINALYSIS AUTO W/SCOPE: CPT

## 2020-08-13 PROCEDURE — 25010000002 IOPAMIDOL 61 % SOLUTION: Performed by: NURSE PRACTITIONER

## 2020-08-13 PROCEDURE — 96374 THER/PROPH/DIAG INJ IV PUSH: CPT

## 2020-08-13 PROCEDURE — 99284 EMERGENCY DEPT VISIT MOD MDM: CPT

## 2020-08-13 PROCEDURE — 85025 COMPLETE CBC W/AUTO DIFF WBC: CPT

## 2020-08-13 PROCEDURE — 25010000002 ONDANSETRON PER 1 MG: Performed by: NURSE PRACTITIONER

## 2020-08-13 PROCEDURE — 83690 ASSAY OF LIPASE: CPT

## 2020-08-13 PROCEDURE — 96375 TX/PRO/DX INJ NEW DRUG ADDON: CPT

## 2020-08-13 PROCEDURE — 99283 EMERGENCY DEPT VISIT LOW MDM: CPT

## 2020-08-13 PROCEDURE — 85610 PROTHROMBIN TIME: CPT | Performed by: NURSE PRACTITIONER

## 2020-08-13 PROCEDURE — 25010000003 HYDROMORPHONE 1 MG/ML SOLUTION: Performed by: NURSE PRACTITIONER

## 2020-08-13 RX ORDER — SODIUM CHLORIDE 0.9 % (FLUSH) 0.9 %
10 SYRINGE (ML) INJECTION AS NEEDED
Status: DISCONTINUED | OUTPATIENT
Start: 2020-08-13 | End: 2020-08-13 | Stop reason: HOSPADM

## 2020-08-13 RX ORDER — ONDANSETRON 4 MG/1
4 TABLET, ORALLY DISINTEGRATING ORAL EVERY 6 HOURS PRN
Qty: 10 TABLET | Refills: 0 | Status: ON HOLD | OUTPATIENT
Start: 2020-08-13 | End: 2020-11-19

## 2020-08-13 RX ORDER — ONDANSETRON 2 MG/ML
4 INJECTION INTRAMUSCULAR; INTRAVENOUS ONCE
Status: COMPLETED | OUTPATIENT
Start: 2020-08-13 | End: 2020-08-13

## 2020-08-13 RX ORDER — PANTOPRAZOLE SODIUM 40 MG/1
40 TABLET, DELAYED RELEASE ORAL DAILY
Qty: 30 TABLET | Refills: 0 | Status: ON HOLD | OUTPATIENT
Start: 2020-08-13 | End: 2020-11-19

## 2020-08-13 RX ADMIN — IOPAMIDOL 100 ML: 612 INJECTION, SOLUTION INTRAVENOUS at 18:59

## 2020-08-13 RX ADMIN — ONDANSETRON HYDROCHLORIDE 4 MG: 2 SOLUTION INTRAMUSCULAR; INTRAVENOUS at 18:22

## 2020-08-13 RX ADMIN — SODIUM CHLORIDE 1000 ML: 9 INJECTION, SOLUTION INTRAVENOUS at 18:21

## 2020-08-13 RX ADMIN — HYDROMORPHONE HYDROCHLORIDE 1 MG: 1 INJECTION, SOLUTION INTRAMUSCULAR; INTRAVENOUS; SUBCUTANEOUS at 18:22

## 2020-08-13 NOTE — ED PROVIDER NOTES
Subjective   59 yom presents with c/o mid abdomen pain.  He states the pain radiates across his abdomen.  He denies nausea or vomiting.  He states he has had a couple 'loose' bowel movements.  He denies fever or cough. He denies dysuria.  He states he has not eaten since Wednesday when the pain started.  He has no abdomen or CVA tenderness.  He denies any previous GI history.           Review of Systems   Constitutional: Negative for activity change, appetite change, fatigue and fever.   HENT: Negative for congestion, ear pain, facial swelling and sore throat.    Eyes: Negative for discharge and visual disturbance.   Respiratory: Negative for apnea, chest tightness, shortness of breath, wheezing and stridor.    Cardiovascular: Negative for chest pain and palpitations.   Gastrointestinal: Positive for abdominal pain. Negative for abdominal distention, diarrhea, nausea and vomiting.   Genitourinary: Negative for difficulty urinating and dysuria.   Musculoskeletal: Negative for arthralgias and myalgias.   Skin: Negative for rash and wound.   Neurological: Negative for dizziness and seizures.   Psychiatric/Behavioral: Negative for agitation and confusion.       Past Medical History:   Diagnosis Date   • Coronary artery disease    • Hyperlipidemia    • Hypertension    • Myocardial infarction (CMS/HCC)        No Known Allergies    Past Surgical History:   Procedure Laterality Date   • CARDIAC CATHETERIZATION     • CATARACT EXTRACTION, BILATERAL     • CORONARY STENT PLACEMENT     • PERIPHERAL ARTERIAL STENT GRAFT         Family History   Problem Relation Age of Onset   • Lung disease Mother    • Heart attack Father    • Heart disease Father    • Lung disease Sister    • Rheum arthritis Sister    • Cancer Sister    • Rheum arthritis Sister    • Liver disease Sister        Social History     Socioeconomic History   • Marital status:      Spouse name: Not on file   • Number of children: Not on file   • Years of  education: Not on file   • Highest education level: Not on file   Tobacco Use   • Smoking status: Never Smoker   • Smokeless tobacco: Never Used   Substance and Sexual Activity   • Alcohol use: Yes     Alcohol/week: 6.0 standard drinks     Types: 6 Cans of beer per week     Comment: 2-3 x a week   • Drug use: Yes     Types: Marijuana     Comment: sometimes           Objective   Physical Exam   Constitutional: He is oriented to person, place, and time. He appears well-developed.   HENT:   Head: Normocephalic.   Eyes: Pupils are equal, round, and reactive to light. EOM are normal.   Neck: Normal range of motion. Neck supple.   Cardiovascular: Normal rate and regular rhythm.   No murmur heard.  Pulmonary/Chest: Effort normal and breath sounds normal.   Abdominal: Soft. Bowel sounds are normal. There is no tenderness. There is no CVA tenderness.   Musculoskeletal: Normal range of motion.   Neurological: He is alert and oriented to person, place, and time.   Skin: Skin is warm and dry.   Psychiatric: He has a normal mood and affect.   Nursing note and vitals reviewed.      Procedures           ED Course  ED Course as of Aug 17 0815   Thu Aug 13, 2020   1915 Transfer of care to [facility] S[facility]DI[facility]    [KS]   1923 Assumed care of pt from [place] [facility]. Pending ct scan abd/pelvis at this time     [CW]   2105 Assumed care of patient from [facility].  Reviewed CAT scan results which were negative for any acute abnormality.  Patient states that his pain is midepigastric area.  Advised patient that he may have some symptoms related to gallbladder issues.  Will prescribe Levsin, Protonix, and Zofran.  Advised to follow-up with Dr. Bryant tomorrow.  Advised to avoid fried or fatty foods.  Advised to return if symptoms worsen.    [CW]      ED Course User Index  [CW] Daksha Garsia APRN  [KS] Conor Patino APRN                                           [facility]    Final diagnoses:   Epigastric pain             Shoulders, Conor Molina, APRN  08/17/20 0873

## 2020-11-18 ENCOUNTER — HOSPITAL ENCOUNTER (INPATIENT)
Facility: HOSPITAL | Age: 60
LOS: 9 days | Discharge: HOME | End: 2020-11-27
Attending: FAMILY MEDICINE | Admitting: FAMILY MEDICINE

## 2020-11-18 DIAGNOSIS — K35.32 RUPTURED APPENDICITIS: Primary | ICD-10-CM

## 2020-11-18 PROBLEM — K21.9 GERD (GASTROESOPHAGEAL REFLUX DISEASE): Status: ACTIVE | Noted: 2020-11-18

## 2020-11-18 PROBLEM — I25.10 CAD (CORONARY ARTERY DISEASE): Status: ACTIVE | Noted: 2020-11-18

## 2020-11-18 PROBLEM — K56.7 ILEUS: Status: ACTIVE | Noted: 2020-11-18

## 2020-11-18 PROBLEM — K52.9 COLITIS: Status: ACTIVE | Noted: 2020-11-18

## 2020-11-18 LAB
ANION GAP SERPL CALCULATED.3IONS-SCNC: 13 MMOL/L (ref 5–15)
BASOPHILS # BLD AUTO: 0.02 10*3/MM3 (ref 0–0.2)
BASOPHILS NFR BLD AUTO: 0.3 % (ref 0–1.5)
BUN SERPL-MCNC: 19 MG/DL (ref 6–20)
BUN/CREAT SERPL: 24.4 (ref 7–25)
CALCIUM SPEC-SCNC: 9.8 MG/DL (ref 8.6–10.5)
CHLORIDE SERPL-SCNC: 98 MMOL/L (ref 98–107)
CO2 SERPL-SCNC: 22 MMOL/L (ref 22–29)
CREAT SERPL-MCNC: 0.78 MG/DL (ref 0.76–1.27)
D-LACTATE SERPL-SCNC: 1.2 MMOL/L (ref 0.5–2)
DEPRECATED RDW RBC AUTO: 42.5 FL (ref 37–54)
EOSINOPHIL # BLD AUTO: 0.08 10*3/MM3 (ref 0–0.4)
EOSINOPHIL NFR BLD AUTO: 1 % (ref 0.3–6.2)
ERYTHROCYTE [DISTWIDTH] IN BLOOD BY AUTOMATED COUNT: 12.9 % (ref 12.3–15.4)
GFR SERPL CREATININE-BSD FRML MDRD: 102 ML/MIN/1.73
GLUCOSE SERPL-MCNC: 111 MG/DL (ref 65–99)
HCT VFR BLD AUTO: 43.6 % (ref 37.5–51)
HGB BLD-MCNC: 15.2 G/DL (ref 13–17.7)
IMM GRANULOCYTES # BLD AUTO: 0.04 10*3/MM3 (ref 0–0.05)
IMM GRANULOCYTES NFR BLD AUTO: 0.5 % (ref 0–0.5)
INR PPP: 0.98 (ref 0.91–1.09)
LYMPHOCYTES # BLD AUTO: 0.59 10*3/MM3 (ref 0.7–3.1)
LYMPHOCYTES NFR BLD AUTO: 7.5 % (ref 19.6–45.3)
MCH RBC QN AUTO: 31 PG (ref 26.6–33)
MCHC RBC AUTO-ENTMCNC: 34.9 G/DL (ref 31.5–35.7)
MCV RBC AUTO: 89 FL (ref 79–97)
MONOCYTES # BLD AUTO: 0.62 10*3/MM3 (ref 0.1–0.9)
MONOCYTES NFR BLD AUTO: 7.9 % (ref 5–12)
NEUTROPHILS NFR BLD AUTO: 6.5 10*3/MM3 (ref 1.7–7)
NEUTROPHILS NFR BLD AUTO: 82.8 % (ref 42.7–76)
NRBC BLD AUTO-RTO: 0 /100 WBC (ref 0–0.2)
PLATELET # BLD AUTO: 270 10*3/MM3 (ref 140–450)
PMV BLD AUTO: 9.1 FL (ref 6–12)
POTASSIUM SERPL-SCNC: 3.9 MMOL/L (ref 3.5–5.2)
PROTHROMBIN TIME: 12.6 SECONDS (ref 11.9–14.6)
RBC # BLD AUTO: 4.9 10*6/MM3 (ref 4.14–5.8)
SARS-COV-2 RNA PNL SPEC NAA+PROBE: NOT DETECTED
SODIUM SERPL-SCNC: 133 MMOL/L (ref 136–145)
WBC # BLD AUTO: 7.85 10*3/MM3 (ref 3.4–10.8)

## 2020-11-18 PROCEDURE — 25010000002 MORPHINE SULFATE (PF) 2 MG/ML SOLUTION: Performed by: FAMILY MEDICINE

## 2020-11-18 PROCEDURE — 80048 BASIC METABOLIC PNL TOTAL CA: CPT | Performed by: FAMILY MEDICINE

## 2020-11-18 PROCEDURE — 87635 SARS-COV-2 COVID-19 AMP PRB: CPT | Performed by: FAMILY MEDICINE

## 2020-11-18 PROCEDURE — 87040 BLOOD CULTURE FOR BACTERIA: CPT | Performed by: FAMILY MEDICINE

## 2020-11-18 PROCEDURE — 25010000002 THIAMINE PER 100 MG: Performed by: FAMILY MEDICINE

## 2020-11-18 PROCEDURE — 94799 UNLISTED PULMONARY SVC/PX: CPT

## 2020-11-18 PROCEDURE — 25010000002 PIPERACILLIN SOD-TAZOBACTAM PER 1 G: Performed by: SPECIALIST

## 2020-11-18 PROCEDURE — 83605 ASSAY OF LACTIC ACID: CPT | Performed by: FAMILY MEDICINE

## 2020-11-18 PROCEDURE — 25010000002 PIPERACILLIN SOD-TAZOBACTAM PER 1 G: Performed by: FAMILY MEDICINE

## 2020-11-18 PROCEDURE — 25010000002 KETOROLAC TROMETHAMINE PER 15 MG: Performed by: SPECIALIST

## 2020-11-18 PROCEDURE — 25010000002 LORAZEPAM PER 2 MG: Performed by: SPECIALIST

## 2020-11-18 PROCEDURE — 85610 PROTHROMBIN TIME: CPT | Performed by: FAMILY MEDICINE

## 2020-11-18 PROCEDURE — 85025 COMPLETE CBC W/AUTO DIFF WBC: CPT | Performed by: FAMILY MEDICINE

## 2020-11-18 RX ORDER — HYDROCODONE BITARTRATE AND ACETAMINOPHEN 7.5; 325 MG/1; MG/1
1 TABLET ORAL EVERY 4 HOURS PRN
Status: DISCONTINUED | OUTPATIENT
Start: 2020-11-18 | End: 2020-11-27 | Stop reason: HOSPADM

## 2020-11-18 RX ORDER — SODIUM CHLORIDE 0.9 % (FLUSH) 0.9 %
10 SYRINGE (ML) INJECTION EVERY 12 HOURS SCHEDULED
Status: DISCONTINUED | OUTPATIENT
Start: 2020-11-18 | End: 2020-11-27 | Stop reason: HOSPADM

## 2020-11-18 RX ORDER — LORAZEPAM 2 MG/ML
1 INJECTION INTRAMUSCULAR EVERY 4 HOURS PRN
Status: DISCONTINUED | OUTPATIENT
Start: 2020-11-18 | End: 2020-11-27 | Stop reason: HOSPADM

## 2020-11-18 RX ORDER — NALOXONE HCL 0.4 MG/ML
0.4 VIAL (ML) INJECTION
Status: DISCONTINUED | OUTPATIENT
Start: 2020-11-18 | End: 2020-11-27 | Stop reason: HOSPADM

## 2020-11-18 RX ORDER — SIMETHICONE 80 MG
80 TABLET,CHEWABLE ORAL 4 TIMES DAILY PRN
Status: DISCONTINUED | OUTPATIENT
Start: 2020-11-18 | End: 2020-11-27 | Stop reason: HOSPADM

## 2020-11-18 RX ORDER — LANOLIN ALCOHOL/MO/W.PET/CERES
800 CREAM (GRAM) TOPICAL EVERY 24 HOURS
Status: DISCONTINUED | OUTPATIENT
Start: 2020-11-18 | End: 2020-11-27 | Stop reason: HOSPADM

## 2020-11-18 RX ORDER — SODIUM CHLORIDE 0.9 % (FLUSH) 0.9 %
10 SYRINGE (ML) INJECTION AS NEEDED
Status: DISCONTINUED | OUTPATIENT
Start: 2020-11-18 | End: 2020-11-27 | Stop reason: HOSPADM

## 2020-11-18 RX ORDER — ONDANSETRON 2 MG/ML
4 INJECTION INTRAMUSCULAR; INTRAVENOUS EVERY 6 HOURS PRN
Status: DISCONTINUED | OUTPATIENT
Start: 2020-11-18 | End: 2020-11-27 | Stop reason: HOSPADM

## 2020-11-18 RX ORDER — SUCRALFATE 1 G/1
1 TABLET ORAL
Status: DISCONTINUED | OUTPATIENT
Start: 2020-11-18 | End: 2020-11-27 | Stop reason: HOSPADM

## 2020-11-18 RX ORDER — PANTOPRAZOLE SODIUM 40 MG/1
40 TABLET, DELAYED RELEASE ORAL DAILY
Status: DISCONTINUED | OUTPATIENT
Start: 2020-11-18 | End: 2020-11-27 | Stop reason: HOSPADM

## 2020-11-18 RX ORDER — ONDANSETRON 8 MG/1
8 TABLET, ORALLY DISINTEGRATING ORAL EVERY 6 HOURS PRN
Status: DISCONTINUED | OUTPATIENT
Start: 2020-11-18 | End: 2020-11-27 | Stop reason: HOSPADM

## 2020-11-18 RX ORDER — RAMIPRIL 5 MG/1
10 CAPSULE ORAL DAILY
Status: DISCONTINUED | OUTPATIENT
Start: 2020-11-18 | End: 2020-11-27 | Stop reason: HOSPADM

## 2020-11-18 RX ORDER — FAMOTIDINE 20 MG/1
20 TABLET, FILM COATED ORAL 2 TIMES DAILY
Status: DISCONTINUED | OUTPATIENT
Start: 2020-11-18 | End: 2020-11-19 | Stop reason: ALTCHOICE

## 2020-11-18 RX ORDER — MORPHINE SULFATE 2 MG/ML
2 INJECTION, SOLUTION INTRAMUSCULAR; INTRAVENOUS EVERY 4 HOURS PRN
Status: DISPENSED | OUTPATIENT
Start: 2020-11-18 | End: 2020-11-25

## 2020-11-18 RX ORDER — KETOROLAC TROMETHAMINE 30 MG/ML
30 INJECTION, SOLUTION INTRAMUSCULAR; INTRAVENOUS EVERY 6 HOURS PRN
Status: DISPENSED | OUTPATIENT
Start: 2020-11-18 | End: 2020-11-23

## 2020-11-18 RX ORDER — CARVEDILOL 3.12 MG/1
3.12 TABLET ORAL 2 TIMES DAILY WITH MEALS
Status: DISCONTINUED | OUTPATIENT
Start: 2020-11-18 | End: 2020-11-27 | Stop reason: HOSPADM

## 2020-11-18 RX ORDER — SODIUM CHLORIDE, SODIUM LACTATE, POTASSIUM CHLORIDE, CALCIUM CHLORIDE 600; 310; 30; 20 MG/100ML; MG/100ML; MG/100ML; MG/100ML
50 INJECTION, SOLUTION INTRAVENOUS CONTINUOUS
Status: DISCONTINUED | OUTPATIENT
Start: 2020-11-18 | End: 2020-11-27

## 2020-11-18 RX ORDER — ONDANSETRON HCL IN 0.9 % NACL 8 MG/50 ML
8 INTRAVENOUS SOLUTION, PIGGYBACK (ML) INTRAVENOUS EVERY 6 HOURS PRN
Status: DISCONTINUED | OUTPATIENT
Start: 2020-11-18 | End: 2020-11-27 | Stop reason: HOSPADM

## 2020-11-18 RX ADMIN — RAMIPRIL 10 MG: 5 CAPSULE ORAL at 12:46

## 2020-11-18 RX ADMIN — FAMOTIDINE 20 MG: 20 TABLET, FILM COATED ORAL at 21:00

## 2020-11-18 RX ADMIN — LORAZEPAM 1 MG: 2 INJECTION INTRAMUSCULAR; INTRAVENOUS at 21:00

## 2020-11-18 RX ADMIN — SODIUM CHLORIDE, POTASSIUM CHLORIDE, SODIUM LACTATE AND CALCIUM CHLORIDE 125 ML/HR: 600; 310; 30; 20 INJECTION, SOLUTION INTRAVENOUS at 12:46

## 2020-11-18 RX ADMIN — TAZOBACTAM SODIUM AND PIPERACILLIN SODIUM 3.38 G: 375; 3 INJECTION, SOLUTION INTRAVENOUS at 20:59

## 2020-11-18 RX ADMIN — MORPHINE SULFATE 2 MG: 2 INJECTION, SOLUTION INTRAMUSCULAR; INTRAVENOUS at 21:03

## 2020-11-18 RX ADMIN — TAZOBACTAM SODIUM AND PIPERACILLIN SODIUM 3.38 G: 375; 3 INJECTION, SOLUTION INTRAVENOUS at 12:46

## 2020-11-18 RX ADMIN — PANTOPRAZOLE SODIUM 40 MG: 40 TABLET, DELAYED RELEASE ORAL at 12:46

## 2020-11-18 RX ADMIN — SUCRALFATE 1 G: 1 TABLET ORAL at 21:00

## 2020-11-18 RX ADMIN — THIAMINE HYDROCHLORIDE 100 MG: 100 INJECTION, SOLUTION INTRAMUSCULAR; INTRAVENOUS at 14:35

## 2020-11-18 RX ADMIN — MORPHINE SULFATE 2 MG: 2 INJECTION, SOLUTION INTRAMUSCULAR; INTRAVENOUS at 12:46

## 2020-11-18 RX ADMIN — METRONIDAZOLE 500 MG: 500 INJECTION, SOLUTION INTRAVENOUS at 17:23

## 2020-11-18 RX ADMIN — SUCRALFATE 1 G: 1 TABLET ORAL at 17:23

## 2020-11-18 RX ADMIN — SODIUM CHLORIDE, PRESERVATIVE FREE 10 ML: 5 INJECTION INTRAVENOUS at 12:47

## 2020-11-18 RX ADMIN — CARVEDILOL 3.12 MG: 3.12 TABLET, FILM COATED ORAL at 17:23

## 2020-11-18 RX ADMIN — KETOROLAC TROMETHAMINE 30 MG: 30 INJECTION, SOLUTION INTRAMUSCULAR at 18:32

## 2020-11-18 RX ADMIN — Medication 800 MCG: at 12:46

## 2020-11-19 LAB
ALBUMIN SERPL-MCNC: 3.1 G/DL (ref 3.5–5.2)
ALBUMIN/GLOB SERPL: 1 G/DL
ALP SERPL-CCNC: 82 U/L (ref 39–117)
ALT SERPL W P-5'-P-CCNC: 12 U/L (ref 1–41)
ANION GAP SERPL CALCULATED.3IONS-SCNC: 9 MMOL/L (ref 5–15)
AST SERPL-CCNC: 29 U/L (ref 1–40)
BASOPHILS # BLD AUTO: 0.04 10*3/MM3 (ref 0–0.2)
BASOPHILS NFR BLD AUTO: 0.7 % (ref 0–1.5)
BILIRUB SERPL-MCNC: 0.5 MG/DL (ref 0–1.2)
BUN SERPL-MCNC: 17 MG/DL (ref 6–20)
BUN/CREAT SERPL: 21.8 (ref 7–25)
CALCIUM SPEC-SCNC: 9.3 MG/DL (ref 8.6–10.5)
CHLORIDE SERPL-SCNC: 102 MMOL/L (ref 98–107)
CO2 SERPL-SCNC: 24 MMOL/L (ref 22–29)
CREAT SERPL-MCNC: 0.78 MG/DL (ref 0.76–1.27)
CRP SERPL-MCNC: 10.13 MG/DL (ref 0–0.5)
DEPRECATED RDW RBC AUTO: 41.8 FL (ref 37–54)
EOSINOPHIL # BLD AUTO: 0.32 10*3/MM3 (ref 0–0.4)
EOSINOPHIL NFR BLD AUTO: 5.7 % (ref 0.3–6.2)
ERYTHROCYTE [DISTWIDTH] IN BLOOD BY AUTOMATED COUNT: 12.8 % (ref 12.3–15.4)
ERYTHROCYTE [SEDIMENTATION RATE] IN BLOOD: 29 MM/HR (ref 0–15)
GFR SERPL CREATININE-BSD FRML MDRD: 102 ML/MIN/1.73
GLOBULIN UR ELPH-MCNC: 3.2 GM/DL
GLUCOSE SERPL-MCNC: 99 MG/DL (ref 65–99)
HCT VFR BLD AUTO: 38.4 % (ref 37.5–51)
HGB BLD-MCNC: 13.2 G/DL (ref 13–17.7)
IMM GRANULOCYTES # BLD AUTO: 0.04 10*3/MM3 (ref 0–0.05)
IMM GRANULOCYTES NFR BLD AUTO: 0.7 % (ref 0–0.5)
LYMPHOCYTES # BLD AUTO: 1.02 10*3/MM3 (ref 0.7–3.1)
LYMPHOCYTES NFR BLD AUTO: 18.3 % (ref 19.6–45.3)
MCH RBC QN AUTO: 30.6 PG (ref 26.6–33)
MCHC RBC AUTO-ENTMCNC: 34.4 G/DL (ref 31.5–35.7)
MCV RBC AUTO: 88.9 FL (ref 79–97)
MONOCYTES # BLD AUTO: 0.63 10*3/MM3 (ref 0.1–0.9)
MONOCYTES NFR BLD AUTO: 11.3 % (ref 5–12)
NEUTROPHILS NFR BLD AUTO: 3.52 10*3/MM3 (ref 1.7–7)
NEUTROPHILS NFR BLD AUTO: 63.3 % (ref 42.7–76)
NRBC BLD AUTO-RTO: 0 /100 WBC (ref 0–0.2)
PLATELET # BLD AUTO: 253 10*3/MM3 (ref 140–450)
PMV BLD AUTO: 9.3 FL (ref 6–12)
POTASSIUM SERPL-SCNC: 3.5 MMOL/L (ref 3.5–5.2)
PROT SERPL-MCNC: 6.3 G/DL (ref 6–8.5)
RBC # BLD AUTO: 4.32 10*6/MM3 (ref 4.14–5.8)
SODIUM SERPL-SCNC: 135 MMOL/L (ref 136–145)
WBC # BLD AUTO: 5.57 10*3/MM3 (ref 3.4–10.8)

## 2020-11-19 PROCEDURE — 36410 VNPNXR 3YR/> PHY/QHP DX/THER: CPT

## 2020-11-19 PROCEDURE — 85025 COMPLETE CBC W/AUTO DIFF WBC: CPT | Performed by: FAMILY MEDICINE

## 2020-11-19 PROCEDURE — 05HC33Z INSERTION OF INFUSION DEVICE INTO LEFT BASILIC VEIN, PERCUTANEOUS APPROACH: ICD-10-PCS | Performed by: SPECIALIST

## 2020-11-19 PROCEDURE — C1751 CATH, INF, PER/CENT/MIDLINE: HCPCS

## 2020-11-19 PROCEDURE — 85651 RBC SED RATE NONAUTOMATED: CPT | Performed by: SPECIALIST

## 2020-11-19 PROCEDURE — 86140 C-REACTIVE PROTEIN: CPT | Performed by: SPECIALIST

## 2020-11-19 PROCEDURE — 25010000002 THIAMINE PER 100 MG: Performed by: FAMILY MEDICINE

## 2020-11-19 PROCEDURE — 25010000002 PIPERACILLIN SOD-TAZOBACTAM PER 1 G: Performed by: SPECIALIST

## 2020-11-19 PROCEDURE — 25010000002 ENOXAPARIN PER 10 MG: Performed by: FAMILY MEDICINE

## 2020-11-19 PROCEDURE — 80053 COMPREHEN METABOLIC PANEL: CPT | Performed by: SPECIALIST

## 2020-11-19 PROCEDURE — 25010000002 MORPHINE SULFATE (PF) 2 MG/ML SOLUTION: Performed by: FAMILY MEDICINE

## 2020-11-19 PROCEDURE — B54NZZA ULTRASONOGRAPHY OF LEFT UPPER EXTREMITY VEINS, GUIDANCE: ICD-10-PCS | Performed by: SPECIALIST

## 2020-11-19 PROCEDURE — 25010000002 LORAZEPAM PER 2 MG: Performed by: SPECIALIST

## 2020-11-19 RX ORDER — SILDENAFIL CITRATE 20 MG/1
40 TABLET ORAL DAILY PRN
COMMUNITY

## 2020-11-19 RX ORDER — TRAZODONE HYDROCHLORIDE 50 MG/1
50 TABLET ORAL NIGHTLY PRN
Status: ON HOLD | COMMUNITY
End: 2020-11-19

## 2020-11-19 RX ADMIN — METRONIDAZOLE 500 MG: 500 INJECTION, SOLUTION INTRAVENOUS at 11:37

## 2020-11-19 RX ADMIN — TAZOBACTAM SODIUM AND PIPERACILLIN SODIUM 3.38 G: 375; 3 INJECTION, SOLUTION INTRAVENOUS at 11:37

## 2020-11-19 RX ADMIN — SODIUM CHLORIDE, POTASSIUM CHLORIDE, SODIUM LACTATE AND CALCIUM CHLORIDE 125 ML/HR: 600; 310; 30; 20 INJECTION, SOLUTION INTRAVENOUS at 01:00

## 2020-11-19 RX ADMIN — CARVEDILOL 3.12 MG: 3.12 TABLET, FILM COATED ORAL at 08:31

## 2020-11-19 RX ADMIN — THIAMINE HYDROCHLORIDE 100 MG: 100 INJECTION, SOLUTION INTRAMUSCULAR; INTRAVENOUS at 08:32

## 2020-11-19 RX ADMIN — SODIUM CHLORIDE, POTASSIUM CHLORIDE, SODIUM LACTATE AND CALCIUM CHLORIDE 125 ML/HR: 600; 310; 30; 20 INJECTION, SOLUTION INTRAVENOUS at 09:59

## 2020-11-19 RX ADMIN — SODIUM CHLORIDE, POTASSIUM CHLORIDE, SODIUM LACTATE AND CALCIUM CHLORIDE 1000 ML: 600; 310; 30; 20 INJECTION, SOLUTION INTRAVENOUS at 08:32

## 2020-11-19 RX ADMIN — METRONIDAZOLE 500 MG: 500 INJECTION, SOLUTION INTRAVENOUS at 17:00

## 2020-11-19 RX ADMIN — PANTOPRAZOLE SODIUM 40 MG: 40 TABLET, DELAYED RELEASE ORAL at 08:32

## 2020-11-19 RX ADMIN — SUCRALFATE 1 G: 1 TABLET ORAL at 11:36

## 2020-11-19 RX ADMIN — RAMIPRIL 10 MG: 5 CAPSULE ORAL at 08:32

## 2020-11-19 RX ADMIN — METRONIDAZOLE 500 MG: 500 INJECTION, SOLUTION INTRAVENOUS at 00:10

## 2020-11-19 RX ADMIN — LEUCINE, PHENYLALANINE, LYSINE, METHIONINE, ISOLEUCINE, VALINE, HISTIDINE, THREONINE, TRYPTOPHAN, ALANINE, GLYCINE, ARGININE, PROLINE, SERINE, TYROSINE, SODIUM ACETATE, DIBASIC POTASSIUM PHOSPHATE, MAGNESIUM CHLORIDE, SODIUM CHLORIDE, CALCIUM CHLORIDE, DEXTROSE
311; 238; 247; 170; 255; 247; 204; 179; 77; 880; 438; 489; 289; 213; 17; 297; 261; 51; 77; 33; 5 INJECTION INTRAVENOUS at 20:29

## 2020-11-19 RX ADMIN — Medication 800 MCG: at 11:36

## 2020-11-19 RX ADMIN — TAZOBACTAM SODIUM AND PIPERACILLIN SODIUM 3.38 G: 375; 3 INJECTION, SOLUTION INTRAVENOUS at 04:00

## 2020-11-19 RX ADMIN — TAZOBACTAM SODIUM AND PIPERACILLIN SODIUM 3.38 G: 375; 3 INJECTION, SOLUTION INTRAVENOUS at 19:36

## 2020-11-19 RX ADMIN — MORPHINE SULFATE 2 MG: 2 INJECTION, SOLUTION INTRAMUSCULAR; INTRAVENOUS at 22:39

## 2020-11-19 RX ADMIN — SUCRALFATE 1 G: 1 TABLET ORAL at 20:29

## 2020-11-19 RX ADMIN — METRONIDAZOLE 500 MG: 500 INJECTION, SOLUTION INTRAVENOUS at 23:23

## 2020-11-19 RX ADMIN — LORAZEPAM 1 MG: 2 INJECTION INTRAMUSCULAR; INTRAVENOUS at 22:39

## 2020-11-19 RX ADMIN — FAMOTIDINE 20 MG: 20 TABLET, FILM COATED ORAL at 08:32

## 2020-11-19 RX ADMIN — SUCRALFATE 1 G: 1 TABLET ORAL at 16:57

## 2020-11-19 RX ADMIN — ENOXAPARIN SODIUM 40 MG: 100 INJECTION SUBCUTANEOUS at 11:37

## 2020-11-19 RX ADMIN — METRONIDAZOLE 500 MG: 500 INJECTION, SOLUTION INTRAVENOUS at 05:42

## 2020-11-19 RX ADMIN — SODIUM CHLORIDE, POTASSIUM CHLORIDE, SODIUM LACTATE AND CALCIUM CHLORIDE 125 ML/HR: 600; 310; 30; 20 INJECTION, SOLUTION INTRAVENOUS at 19:35

## 2020-11-19 RX ADMIN — SUCRALFATE 1 G: 1 TABLET ORAL at 08:32

## 2020-11-20 LAB
ALBUMIN SERPL-MCNC: 3.1 G/DL (ref 3.5–5.2)
ALBUMIN/GLOB SERPL: 1.1 G/DL
ALP SERPL-CCNC: 68 U/L (ref 39–117)
ALT SERPL W P-5'-P-CCNC: 9 U/L (ref 1–41)
ANION GAP SERPL CALCULATED.3IONS-SCNC: 9 MMOL/L (ref 5–15)
AST SERPL-CCNC: 12 U/L (ref 1–40)
BASOPHILS # BLD AUTO: 0.03 10*3/MM3 (ref 0–0.2)
BASOPHILS NFR BLD AUTO: 0.6 % (ref 0–1.5)
BILIRUB SERPL-MCNC: 0.5 MG/DL (ref 0–1.2)
BUN SERPL-MCNC: 12 MG/DL (ref 6–20)
BUN/CREAT SERPL: 18.5 (ref 7–25)
CALCIUM SPEC-SCNC: 9.1 MG/DL (ref 8.6–10.5)
CHLORIDE SERPL-SCNC: 103 MMOL/L (ref 98–107)
CO2 SERPL-SCNC: 26 MMOL/L (ref 22–29)
CREAT SERPL-MCNC: 0.65 MG/DL (ref 0.76–1.27)
DEPRECATED RDW RBC AUTO: 40.2 FL (ref 37–54)
EOSINOPHIL # BLD AUTO: 0.24 10*3/MM3 (ref 0–0.4)
EOSINOPHIL NFR BLD AUTO: 4.5 % (ref 0.3–6.2)
ERYTHROCYTE [DISTWIDTH] IN BLOOD BY AUTOMATED COUNT: 12.6 % (ref 12.3–15.4)
GFR SERPL CREATININE-BSD FRML MDRD: 126 ML/MIN/1.73
GLOBULIN UR ELPH-MCNC: 2.7 GM/DL
GLUCOSE SERPL-MCNC: 114 MG/DL (ref 65–99)
HCT VFR BLD AUTO: 34.8 % (ref 37.5–51)
HGB BLD-MCNC: 12.1 G/DL (ref 13–17.7)
IMM GRANULOCYTES # BLD AUTO: 0.05 10*3/MM3 (ref 0–0.05)
IMM GRANULOCYTES NFR BLD AUTO: 0.9 % (ref 0–0.5)
LYMPHOCYTES # BLD AUTO: 1.18 10*3/MM3 (ref 0.7–3.1)
LYMPHOCYTES NFR BLD AUTO: 22.1 % (ref 19.6–45.3)
MCH RBC QN AUTO: 30.4 PG (ref 26.6–33)
MCHC RBC AUTO-ENTMCNC: 34.8 G/DL (ref 31.5–35.7)
MCV RBC AUTO: 87.4 FL (ref 79–97)
MONOCYTES # BLD AUTO: 0.58 10*3/MM3 (ref 0.1–0.9)
MONOCYTES NFR BLD AUTO: 10.8 % (ref 5–12)
NEUTROPHILS NFR BLD AUTO: 3.27 10*3/MM3 (ref 1.7–7)
NEUTROPHILS NFR BLD AUTO: 61.1 % (ref 42.7–76)
NRBC BLD AUTO-RTO: 0 /100 WBC (ref 0–0.2)
PLATELET # BLD AUTO: 246 10*3/MM3 (ref 140–450)
PMV BLD AUTO: 9.1 FL (ref 6–12)
POTASSIUM SERPL-SCNC: 2.9 MMOL/L (ref 3.5–5.2)
PROT SERPL-MCNC: 5.8 G/DL (ref 6–8.5)
RBC # BLD AUTO: 3.98 10*6/MM3 (ref 4.14–5.8)
SODIUM SERPL-SCNC: 138 MMOL/L (ref 136–145)
WBC # BLD AUTO: 5.35 10*3/MM3 (ref 3.4–10.8)

## 2020-11-20 PROCEDURE — 25010000002 THIAMINE PER 100 MG: Performed by: FAMILY MEDICINE

## 2020-11-20 PROCEDURE — 25010000002 MORPHINE SULFATE (PF) 2 MG/ML SOLUTION: Performed by: FAMILY MEDICINE

## 2020-11-20 PROCEDURE — 25010000002 POTASSIUM CHLORIDE PER 2 MEQ: Performed by: SPECIALIST

## 2020-11-20 PROCEDURE — C1751 CATH, INF, PER/CENT/MIDLINE: HCPCS

## 2020-11-20 PROCEDURE — 85025 COMPLETE CBC W/AUTO DIFF WBC: CPT | Performed by: FAMILY MEDICINE

## 2020-11-20 PROCEDURE — 02HV33Z INSERTION OF INFUSION DEVICE INTO SUPERIOR VENA CAVA, PERCUTANEOUS APPROACH: ICD-10-PCS | Performed by: FAMILY MEDICINE

## 2020-11-20 PROCEDURE — 80053 COMPREHEN METABOLIC PANEL: CPT | Performed by: SPECIALIST

## 2020-11-20 PROCEDURE — 25010000002 LORAZEPAM PER 2 MG: Performed by: SPECIALIST

## 2020-11-20 PROCEDURE — 25010000002 PIPERACILLIN SOD-TAZOBACTAM PER 1 G: Performed by: SPECIALIST

## 2020-11-20 PROCEDURE — 25010000002 ENOXAPARIN PER 10 MG: Performed by: FAMILY MEDICINE

## 2020-11-20 RX ORDER — POTASSIUM CHLORIDE 14.9 MG/ML
20 INJECTION INTRAVENOUS
Status: COMPLETED | OUTPATIENT
Start: 2020-11-20 | End: 2020-11-20

## 2020-11-20 RX ORDER — POTASSIUM CHLORIDE 14.9 MG/ML
20 INJECTION INTRAVENOUS
Status: DISCONTINUED | OUTPATIENT
Start: 2020-11-20 | End: 2020-11-20

## 2020-11-20 RX ORDER — LIDOCAINE HYDROCHLORIDE 10 MG/ML
1 INJECTION, SOLUTION EPIDURAL; INFILTRATION; INTRACAUDAL; PERINEURAL ONCE
Status: COMPLETED | OUTPATIENT
Start: 2020-11-20 | End: 2020-11-20

## 2020-11-20 RX ADMIN — POTASSIUM CHLORIDE 20 MEQ: 14.9 INJECTION, SOLUTION INTRAVENOUS at 17:16

## 2020-11-20 RX ADMIN — POTASSIUM CHLORIDE 20 MEQ: 14.9 INJECTION, SOLUTION INTRAVENOUS at 19:21

## 2020-11-20 RX ADMIN — POTASSIUM CHLORIDE 20 MEQ: 14.9 INJECTION, SOLUTION INTRAVENOUS at 15:06

## 2020-11-20 RX ADMIN — ASCORBIC ACID, VITAMIN A PALMITATE, CHOLECALCIFEROL, THIAMINE HYDROCHLORIDE, RIBOFLAVIN-5 PHOSPHATE SODIUM, PYRIDOXINE HYDROCHLORIDE, NIACINAMIDE, DEXPANTHENOL, ALPHA-TOCOPHEROL ACETATE, VITAMIN K1, FOLIC ACID, BIOTIN, CYANOCOBALAMIN: 200; 3300; 200; 6; 3.6; 6; 40; 15; 10; 150; 600; 60; 5 INJECTION, SOLUTION INTRAVENOUS at 17:14

## 2020-11-20 RX ADMIN — LORAZEPAM 1 MG: 2 INJECTION INTRAMUSCULAR; INTRAVENOUS at 22:10

## 2020-11-20 RX ADMIN — TAZOBACTAM SODIUM AND PIPERACILLIN SODIUM 3.38 G: 375; 3 INJECTION, SOLUTION INTRAVENOUS at 03:55

## 2020-11-20 RX ADMIN — SUCRALFATE 1 G: 1 TABLET ORAL at 17:01

## 2020-11-20 RX ADMIN — MORPHINE SULFATE 2 MG: 2 INJECTION, SOLUTION INTRAMUSCULAR; INTRAVENOUS at 22:10

## 2020-11-20 RX ADMIN — SUCRALFATE 1 G: 1 TABLET ORAL at 22:09

## 2020-11-20 RX ADMIN — ENOXAPARIN SODIUM 40 MG: 100 INJECTION SUBCUTANEOUS at 13:15

## 2020-11-20 RX ADMIN — TAZOBACTAM SODIUM AND PIPERACILLIN SODIUM 3.38 G: 375; 3 INJECTION, SOLUTION INTRAVENOUS at 11:36

## 2020-11-20 RX ADMIN — MORPHINE SULFATE 2 MG: 2 INJECTION, SOLUTION INTRAMUSCULAR; INTRAVENOUS at 07:25

## 2020-11-20 RX ADMIN — METRONIDAZOLE 500 MG: 500 INJECTION, SOLUTION INTRAVENOUS at 13:14

## 2020-11-20 RX ADMIN — SUCRALFATE 1 G: 1 TABLET ORAL at 11:36

## 2020-11-20 RX ADMIN — SODIUM CHLORIDE, POTASSIUM CHLORIDE, SODIUM LACTATE AND CALCIUM CHLORIDE 125 ML/HR: 600; 310; 30; 20 INJECTION, SOLUTION INTRAVENOUS at 11:36

## 2020-11-20 RX ADMIN — RAMIPRIL 10 MG: 5 CAPSULE ORAL at 08:28

## 2020-11-20 RX ADMIN — METRONIDAZOLE 500 MG: 500 INJECTION, SOLUTION INTRAVENOUS at 17:01

## 2020-11-20 RX ADMIN — SUCRALFATE 1 G: 1 TABLET ORAL at 08:28

## 2020-11-20 RX ADMIN — SODIUM CHLORIDE, POTASSIUM CHLORIDE, SODIUM LACTATE AND CALCIUM CHLORIDE 1000 ML: 600; 310; 30; 20 INJECTION, SOLUTION INTRAVENOUS at 08:33

## 2020-11-20 RX ADMIN — POTASSIUM CHLORIDE 20 MEQ: 14.9 INJECTION, SOLUTION INTRAVENOUS at 12:29

## 2020-11-20 RX ADMIN — POTASSIUM CHLORIDE 20 MEQ: 14.9 INJECTION, SOLUTION INTRAVENOUS at 10:23

## 2020-11-20 RX ADMIN — THIAMINE HYDROCHLORIDE 100 MG: 100 INJECTION, SOLUTION INTRAMUSCULAR; INTRAVENOUS at 08:28

## 2020-11-20 RX ADMIN — LIDOCAINE HYDROCHLORIDE ANHYDROUS 1 ML: 10 INJECTION, SOLUTION INFILTRATION at 16:10

## 2020-11-20 RX ADMIN — METRONIDAZOLE 500 MG: 500 INJECTION, SOLUTION INTRAVENOUS at 07:00

## 2020-11-20 RX ADMIN — PANTOPRAZOLE SODIUM 40 MG: 40 TABLET, DELAYED RELEASE ORAL at 08:28

## 2020-11-20 RX ADMIN — TAZOBACTAM SODIUM AND PIPERACILLIN SODIUM 3.38 G: 375; 3 INJECTION, SOLUTION INTRAVENOUS at 19:51

## 2020-11-21 LAB
ANION GAP SERPL CALCULATED.3IONS-SCNC: 6 MMOL/L (ref 5–15)
BASOPHILS # BLD AUTO: 0.02 10*3/MM3 (ref 0–0.2)
BASOPHILS NFR BLD AUTO: 0.4 % (ref 0–1.5)
BUN SERPL-MCNC: 10 MG/DL (ref 6–20)
BUN/CREAT SERPL: 15.6 (ref 7–25)
CALCIUM SPEC-SCNC: 9.1 MG/DL (ref 8.6–10.5)
CHLORIDE SERPL-SCNC: 104 MMOL/L (ref 98–107)
CO2 SERPL-SCNC: 28 MMOL/L (ref 22–29)
CREAT SERPL-MCNC: 0.64 MG/DL (ref 0.76–1.27)
DEPRECATED RDW RBC AUTO: 41.6 FL (ref 37–54)
EOSINOPHIL # BLD AUTO: 0.23 10*3/MM3 (ref 0–0.4)
EOSINOPHIL NFR BLD AUTO: 4.6 % (ref 0.3–6.2)
ERYTHROCYTE [DISTWIDTH] IN BLOOD BY AUTOMATED COUNT: 12.5 % (ref 12.3–15.4)
GFR SERPL CREATININE-BSD FRML MDRD: 128 ML/MIN/1.73
GLUCOSE SERPL-MCNC: 146 MG/DL (ref 65–99)
HCT VFR BLD AUTO: 36.4 % (ref 37.5–51)
HGB BLD-MCNC: 12.4 G/DL (ref 13–17.7)
IMM GRANULOCYTES # BLD AUTO: 0.04 10*3/MM3 (ref 0–0.05)
IMM GRANULOCYTES NFR BLD AUTO: 0.8 % (ref 0–0.5)
LYMPHOCYTES # BLD AUTO: 1.21 10*3/MM3 (ref 0.7–3.1)
LYMPHOCYTES NFR BLD AUTO: 24.3 % (ref 19.6–45.3)
MCH RBC QN AUTO: 30.6 PG (ref 26.6–33)
MCHC RBC AUTO-ENTMCNC: 34.1 G/DL (ref 31.5–35.7)
MCV RBC AUTO: 89.9 FL (ref 79–97)
MONOCYTES # BLD AUTO: 0.51 10*3/MM3 (ref 0.1–0.9)
MONOCYTES NFR BLD AUTO: 10.3 % (ref 5–12)
NEUTROPHILS NFR BLD AUTO: 2.96 10*3/MM3 (ref 1.7–7)
NEUTROPHILS NFR BLD AUTO: 59.6 % (ref 42.7–76)
NRBC BLD AUTO-RTO: 0 /100 WBC (ref 0–0.2)
PLATELET # BLD AUTO: 229 10*3/MM3 (ref 140–450)
PMV BLD AUTO: 9.2 FL (ref 6–12)
POTASSIUM SERPL-SCNC: 3.8 MMOL/L (ref 3.5–5.2)
RBC # BLD AUTO: 4.05 10*6/MM3 (ref 4.14–5.8)
SODIUM SERPL-SCNC: 138 MMOL/L (ref 136–145)
WBC # BLD AUTO: 4.97 10*3/MM3 (ref 3.4–10.8)

## 2020-11-21 PROCEDURE — 85025 COMPLETE CBC W/AUTO DIFF WBC: CPT | Performed by: FAMILY MEDICINE

## 2020-11-21 PROCEDURE — 25010000002 PIPERACILLIN SOD-TAZOBACTAM PER 1 G: Performed by: SPECIALIST

## 2020-11-21 PROCEDURE — 25010000002 THIAMINE PER 100 MG: Performed by: FAMILY MEDICINE

## 2020-11-21 PROCEDURE — 25010000002 ENOXAPARIN PER 10 MG: Performed by: FAMILY MEDICINE

## 2020-11-21 PROCEDURE — 25010000002 MORPHINE SULFATE (PF) 2 MG/ML SOLUTION: Performed by: FAMILY MEDICINE

## 2020-11-21 PROCEDURE — 25010000002 LORAZEPAM PER 2 MG: Performed by: SPECIALIST

## 2020-11-21 PROCEDURE — 80048 BASIC METABOLIC PNL TOTAL CA: CPT | Performed by: FAMILY MEDICINE

## 2020-11-21 RX ADMIN — METRONIDAZOLE 500 MG: 500 INJECTION, SOLUTION INTRAVENOUS at 00:00

## 2020-11-21 RX ADMIN — SODIUM CHLORIDE, PRESERVATIVE FREE 10 ML: 5 INJECTION INTRAVENOUS at 09:04

## 2020-11-21 RX ADMIN — METRONIDAZOLE 500 MG: 500 INJECTION, SOLUTION INTRAVENOUS at 05:44

## 2020-11-21 RX ADMIN — CARVEDILOL 3.12 MG: 3.12 TABLET, FILM COATED ORAL at 17:31

## 2020-11-21 RX ADMIN — TAZOBACTAM SODIUM AND PIPERACILLIN SODIUM 3.38 G: 375; 3 INJECTION, SOLUTION INTRAVENOUS at 20:23

## 2020-11-21 RX ADMIN — PANTOPRAZOLE SODIUM 40 MG: 40 TABLET, DELAYED RELEASE ORAL at 09:04

## 2020-11-21 RX ADMIN — METRONIDAZOLE 500 MG: 500 INJECTION, SOLUTION INTRAVENOUS at 11:44

## 2020-11-21 RX ADMIN — MORPHINE SULFATE 2 MG: 2 INJECTION, SOLUTION INTRAMUSCULAR; INTRAVENOUS at 22:13

## 2020-11-21 RX ADMIN — SUCRALFATE 1 G: 1 TABLET ORAL at 09:03

## 2020-11-21 RX ADMIN — THIAMINE HYDROCHLORIDE 100 MG: 100 INJECTION, SOLUTION INTRAMUSCULAR; INTRAVENOUS at 09:04

## 2020-11-21 RX ADMIN — SODIUM CHLORIDE, POTASSIUM CHLORIDE, SODIUM LACTATE AND CALCIUM CHLORIDE 125 ML/HR: 600; 310; 30; 20 INJECTION, SOLUTION INTRAVENOUS at 20:23

## 2020-11-21 RX ADMIN — METRONIDAZOLE 500 MG: 500 INJECTION, SOLUTION INTRAVENOUS at 23:45

## 2020-11-21 RX ADMIN — SUCRALFATE 1 G: 1 TABLET ORAL at 20:24

## 2020-11-21 RX ADMIN — ENOXAPARIN SODIUM 40 MG: 100 INJECTION SUBCUTANEOUS at 13:57

## 2020-11-21 RX ADMIN — RAMIPRIL 10 MG: 5 CAPSULE ORAL at 09:03

## 2020-11-21 RX ADMIN — TAZOBACTAM SODIUM AND PIPERACILLIN SODIUM 3.38 G: 375; 3 INJECTION, SOLUTION INTRAVENOUS at 11:50

## 2020-11-21 RX ADMIN — ASCORBIC ACID, VITAMIN A PALMITATE, CHOLECALCIFEROL, THIAMINE HYDROCHLORIDE, RIBOFLAVIN-5 PHOSPHATE SODIUM, PYRIDOXINE HYDROCHLORIDE, NIACINAMIDE, DEXPANTHENOL, ALPHA-TOCOPHEROL ACETATE, VITAMIN K1, FOLIC ACID, BIOTIN, CYANOCOBALAMIN: 200; 3300; 200; 6; 3.6; 6; 40; 15; 10; 150; 600; 60; 5 INJECTION, SOLUTION INTRAVENOUS at 17:26

## 2020-11-21 RX ADMIN — METRONIDAZOLE 500 MG: 500 INJECTION, SOLUTION INTRAVENOUS at 17:26

## 2020-11-21 RX ADMIN — SUCRALFATE 1 G: 1 TABLET ORAL at 17:31

## 2020-11-21 RX ADMIN — CARVEDILOL 3.12 MG: 3.12 TABLET, FILM COATED ORAL at 09:04

## 2020-11-21 RX ADMIN — TAZOBACTAM SODIUM AND PIPERACILLIN SODIUM 3.38 G: 375; 3 INJECTION, SOLUTION INTRAVENOUS at 03:54

## 2020-11-21 RX ADMIN — SUCRALFATE 1 G: 1 TABLET ORAL at 13:57

## 2020-11-21 RX ADMIN — LORAZEPAM 1 MG: 2 INJECTION INTRAMUSCULAR; INTRAVENOUS at 22:13

## 2020-11-21 RX ADMIN — SODIUM CHLORIDE, POTASSIUM CHLORIDE, SODIUM LACTATE AND CALCIUM CHLORIDE 125 ML/HR: 600; 310; 30; 20 INJECTION, SOLUTION INTRAVENOUS at 02:36

## 2020-11-21 RX ADMIN — Medication 800 MCG: at 13:57

## 2020-11-22 LAB
ANION GAP SERPL CALCULATED.3IONS-SCNC: 7 MMOL/L (ref 5–15)
BASOPHILS # BLD AUTO: 0.02 10*3/MM3 (ref 0–0.2)
BASOPHILS NFR BLD AUTO: 0.4 % (ref 0–1.5)
BUN SERPL-MCNC: 11 MG/DL (ref 6–20)
BUN/CREAT SERPL: 16.7 (ref 7–25)
CALCIUM SPEC-SCNC: 9.1 MG/DL (ref 8.6–10.5)
CHLORIDE SERPL-SCNC: 107 MMOL/L (ref 98–107)
CO2 SERPL-SCNC: 28 MMOL/L (ref 22–29)
CREAT SERPL-MCNC: 0.66 MG/DL (ref 0.76–1.27)
DEPRECATED RDW RBC AUTO: 41.7 FL (ref 37–54)
EOSINOPHIL # BLD AUTO: 0.27 10*3/MM3 (ref 0–0.4)
EOSINOPHIL NFR BLD AUTO: 4.8 % (ref 0.3–6.2)
ERYTHROCYTE [DISTWIDTH] IN BLOOD BY AUTOMATED COUNT: 12.6 % (ref 12.3–15.4)
GFR SERPL CREATININE-BSD FRML MDRD: 124 ML/MIN/1.73
GLUCOSE SERPL-MCNC: 163 MG/DL (ref 65–99)
HCT VFR BLD AUTO: 37.8 % (ref 37.5–51)
HGB BLD-MCNC: 12.8 G/DL (ref 13–17.7)
IMM GRANULOCYTES # BLD AUTO: 0.03 10*3/MM3 (ref 0–0.05)
IMM GRANULOCYTES NFR BLD AUTO: 0.5 % (ref 0–0.5)
LYMPHOCYTES # BLD AUTO: 1.2 10*3/MM3 (ref 0.7–3.1)
LYMPHOCYTES NFR BLD AUTO: 21.4 % (ref 19.6–45.3)
MCH RBC QN AUTO: 30.6 PG (ref 26.6–33)
MCHC RBC AUTO-ENTMCNC: 33.9 G/DL (ref 31.5–35.7)
MCV RBC AUTO: 90.4 FL (ref 79–97)
MONOCYTES # BLD AUTO: 0.53 10*3/MM3 (ref 0.1–0.9)
MONOCYTES NFR BLD AUTO: 9.5 % (ref 5–12)
NEUTROPHILS NFR BLD AUTO: 3.55 10*3/MM3 (ref 1.7–7)
NEUTROPHILS NFR BLD AUTO: 63.4 % (ref 42.7–76)
NRBC BLD AUTO-RTO: 0 /100 WBC (ref 0–0.2)
PLATELET # BLD AUTO: 230 10*3/MM3 (ref 140–450)
PMV BLD AUTO: 9.1 FL (ref 6–12)
POTASSIUM SERPL-SCNC: 3.9 MMOL/L (ref 3.5–5.2)
RBC # BLD AUTO: 4.18 10*6/MM3 (ref 4.14–5.8)
SODIUM SERPL-SCNC: 142 MMOL/L (ref 136–145)
WBC # BLD AUTO: 5.6 10*3/MM3 (ref 3.4–10.8)

## 2020-11-22 PROCEDURE — 85025 COMPLETE CBC W/AUTO DIFF WBC: CPT | Performed by: FAMILY MEDICINE

## 2020-11-22 PROCEDURE — 25010000002 PIPERACILLIN SOD-TAZOBACTAM PER 1 G: Performed by: SPECIALIST

## 2020-11-22 PROCEDURE — 25010000002 MORPHINE SULFATE (PF) 2 MG/ML SOLUTION: Performed by: FAMILY MEDICINE

## 2020-11-22 PROCEDURE — 25010000002 LORAZEPAM PER 2 MG: Performed by: SPECIALIST

## 2020-11-22 PROCEDURE — 25010000002 ENOXAPARIN PER 10 MG: Performed by: FAMILY MEDICINE

## 2020-11-22 PROCEDURE — 86140 C-REACTIVE PROTEIN: CPT | Performed by: SPECIALIST

## 2020-11-22 PROCEDURE — 80048 BASIC METABOLIC PNL TOTAL CA: CPT | Performed by: FAMILY MEDICINE

## 2020-11-22 PROCEDURE — 25010000002 THIAMINE PER 100 MG: Performed by: FAMILY MEDICINE

## 2020-11-22 RX ADMIN — SUCRALFATE 1 G: 1 TABLET ORAL at 17:21

## 2020-11-22 RX ADMIN — CARVEDILOL 3.12 MG: 3.12 TABLET, FILM COATED ORAL at 17:21

## 2020-11-22 RX ADMIN — TAZOBACTAM SODIUM AND PIPERACILLIN SODIUM 3.38 G: 375; 3 INJECTION, SOLUTION INTRAVENOUS at 11:11

## 2020-11-22 RX ADMIN — SUCRALFATE 1 G: 1 TABLET ORAL at 21:21

## 2020-11-22 RX ADMIN — SODIUM CHLORIDE, POTASSIUM CHLORIDE, SODIUM LACTATE AND CALCIUM CHLORIDE 50 ML/HR: 600; 310; 30; 20 INJECTION, SOLUTION INTRAVENOUS at 15:17

## 2020-11-22 RX ADMIN — TAZOBACTAM SODIUM AND PIPERACILLIN SODIUM 3.38 G: 375; 3 INJECTION, SOLUTION INTRAVENOUS at 19:42

## 2020-11-22 RX ADMIN — MORPHINE SULFATE 2 MG: 2 INJECTION, SOLUTION INTRAMUSCULAR; INTRAVENOUS at 23:24

## 2020-11-22 RX ADMIN — METRONIDAZOLE 500 MG: 500 INJECTION, SOLUTION INTRAVENOUS at 17:21

## 2020-11-22 RX ADMIN — METRONIDAZOLE 500 MG: 500 INJECTION, SOLUTION INTRAVENOUS at 23:47

## 2020-11-22 RX ADMIN — METRONIDAZOLE 500 MG: 500 INJECTION, SOLUTION INTRAVENOUS at 06:03

## 2020-11-22 RX ADMIN — LORAZEPAM 1 MG: 2 INJECTION INTRAMUSCULAR; INTRAVENOUS at 23:24

## 2020-11-22 RX ADMIN — SODIUM CHLORIDE, PRESERVATIVE FREE 10 ML: 5 INJECTION INTRAVENOUS at 21:21

## 2020-11-22 RX ADMIN — THIAMINE HYDROCHLORIDE 100 MG: 100 INJECTION, SOLUTION INTRAMUSCULAR; INTRAVENOUS at 08:23

## 2020-11-22 RX ADMIN — PANTOPRAZOLE SODIUM 40 MG: 40 TABLET, DELAYED RELEASE ORAL at 08:23

## 2020-11-22 RX ADMIN — ENOXAPARIN SODIUM 40 MG: 100 INJECTION SUBCUTANEOUS at 11:11

## 2020-11-22 RX ADMIN — SUCRALFATE 1 G: 1 TABLET ORAL at 11:11

## 2020-11-22 RX ADMIN — RAMIPRIL 10 MG: 5 CAPSULE ORAL at 08:23

## 2020-11-22 RX ADMIN — METRONIDAZOLE 500 MG: 500 INJECTION, SOLUTION INTRAVENOUS at 11:11

## 2020-11-22 RX ADMIN — TAZOBACTAM SODIUM AND PIPERACILLIN SODIUM 3.38 G: 375; 3 INJECTION, SOLUTION INTRAVENOUS at 03:47

## 2020-11-22 RX ADMIN — SUCRALFATE 1 G: 1 TABLET ORAL at 08:23

## 2020-11-22 RX ADMIN — SODIUM CHLORIDE, PRESERVATIVE FREE 10 ML: 5 INJECTION INTRAVENOUS at 08:24

## 2020-11-22 RX ADMIN — ASCORBIC ACID, VITAMIN A PALMITATE, CHOLECALCIFEROL, THIAMINE HYDROCHLORIDE, RIBOFLAVIN-5 PHOSPHATE SODIUM, PYRIDOXINE HYDROCHLORIDE, NIACINAMIDE, DEXPANTHENOL, ALPHA-TOCOPHEROL ACETATE, VITAMIN K1, FOLIC ACID, BIOTIN, CYANOCOBALAMIN: 200; 3300; 200; 6; 3.6; 6; 40; 15; 10; 150; 600; 60; 5 INJECTION, SOLUTION INTRAVENOUS at 17:21

## 2020-11-22 RX ADMIN — Medication 800 MCG: at 11:11

## 2020-11-22 RX ADMIN — CARVEDILOL 3.12 MG: 3.12 TABLET, FILM COATED ORAL at 08:23

## 2020-11-23 ENCOUNTER — APPOINTMENT (OUTPATIENT)
Dept: CT IMAGING | Facility: HOSPITAL | Age: 60
End: 2020-11-23

## 2020-11-23 LAB
ALBUMIN SERPL-MCNC: 3.1 G/DL (ref 3.5–5.2)
ALBUMIN/GLOB SERPL: 1 G/DL
ALP SERPL-CCNC: 65 U/L (ref 39–117)
ALT SERPL W P-5'-P-CCNC: 26 U/L (ref 1–41)
ANION GAP SERPL CALCULATED.3IONS-SCNC: 6 MMOL/L (ref 5–15)
AST SERPL-CCNC: 46 U/L (ref 1–40)
BACTERIA SPEC AEROBE CULT: NORMAL
BACTERIA SPEC AEROBE CULT: NORMAL
BASOPHILS # BLD AUTO: 0.03 10*3/MM3 (ref 0–0.2)
BASOPHILS NFR BLD AUTO: 0.4 % (ref 0–1.5)
BILIRUB SERPL-MCNC: 0.3 MG/DL (ref 0–1.2)
BUN SERPL-MCNC: 10 MG/DL (ref 6–20)
BUN/CREAT SERPL: 14.1 (ref 7–25)
CALCIUM SPEC-SCNC: 9.2 MG/DL (ref 8.6–10.5)
CHLORIDE SERPL-SCNC: 103 MMOL/L (ref 98–107)
CO2 SERPL-SCNC: 28 MMOL/L (ref 22–29)
CREAT SERPL-MCNC: 0.71 MG/DL (ref 0.76–1.27)
CRP SERPL-MCNC: 1.08 MG/DL (ref 0–0.5)
DEPRECATED RDW RBC AUTO: 41.1 FL (ref 37–54)
EOSINOPHIL # BLD AUTO: 0.39 10*3/MM3 (ref 0–0.4)
EOSINOPHIL NFR BLD AUTO: 5.8 % (ref 0.3–6.2)
ERYTHROCYTE [DISTWIDTH] IN BLOOD BY AUTOMATED COUNT: 12.3 % (ref 12.3–15.4)
ERYTHROCYTE [SEDIMENTATION RATE] IN BLOOD: 19 MM/HR (ref 0–15)
GFR SERPL CREATININE-BSD FRML MDRD: 114 ML/MIN/1.73
GLOBULIN UR ELPH-MCNC: 3 GM/DL
GLUCOSE SERPL-MCNC: 134 MG/DL (ref 65–99)
HCT VFR BLD AUTO: 39 % (ref 37.5–51)
HGB BLD-MCNC: 13.2 G/DL (ref 13–17.7)
IMM GRANULOCYTES # BLD AUTO: 0.05 10*3/MM3 (ref 0–0.05)
IMM GRANULOCYTES NFR BLD AUTO: 0.7 % (ref 0–0.5)
LYMPHOCYTES # BLD AUTO: 1.51 10*3/MM3 (ref 0.7–3.1)
LYMPHOCYTES NFR BLD AUTO: 22.4 % (ref 19.6–45.3)
MCH RBC QN AUTO: 30.6 PG (ref 26.6–33)
MCHC RBC AUTO-ENTMCNC: 33.8 G/DL (ref 31.5–35.7)
MCV RBC AUTO: 90.5 FL (ref 79–97)
MONOCYTES # BLD AUTO: 0.56 10*3/MM3 (ref 0.1–0.9)
MONOCYTES NFR BLD AUTO: 8.3 % (ref 5–12)
NEUTROPHILS NFR BLD AUTO: 4.19 10*3/MM3 (ref 1.7–7)
NEUTROPHILS NFR BLD AUTO: 62.4 % (ref 42.7–76)
NRBC BLD AUTO-RTO: 0 /100 WBC (ref 0–0.2)
PLATELET # BLD AUTO: 283 10*3/MM3 (ref 140–450)
PMV BLD AUTO: 9.4 FL (ref 6–12)
POTASSIUM SERPL-SCNC: 3.9 MMOL/L (ref 3.5–5.2)
PROT SERPL-MCNC: 6.1 G/DL (ref 6–8.5)
RBC # BLD AUTO: 4.31 10*6/MM3 (ref 4.14–5.8)
SODIUM SERPL-SCNC: 137 MMOL/L (ref 136–145)
WBC # BLD AUTO: 6.73 10*3/MM3 (ref 3.4–10.8)

## 2020-11-23 PROCEDURE — 80053 COMPREHEN METABOLIC PANEL: CPT | Performed by: SPECIALIST

## 2020-11-23 PROCEDURE — 25010000002 IOPAMIDOL 61 % SOLUTION: Performed by: FAMILY MEDICINE

## 2020-11-23 PROCEDURE — 25010000002 ENOXAPARIN PER 10 MG: Performed by: FAMILY MEDICINE

## 2020-11-23 PROCEDURE — 25010000002 PIPERACILLIN SOD-TAZOBACTAM PER 1 G: Performed by: SPECIALIST

## 2020-11-23 PROCEDURE — 25010000002 IOPAMIDOL 61 % SOLUTION: Performed by: SPECIALIST

## 2020-11-23 PROCEDURE — 25010000002 THIAMINE PER 100 MG: Performed by: FAMILY MEDICINE

## 2020-11-23 PROCEDURE — 85025 COMPLETE CBC W/AUTO DIFF WBC: CPT | Performed by: FAMILY MEDICINE

## 2020-11-23 PROCEDURE — 25010000002 LORAZEPAM PER 2 MG: Performed by: SPECIALIST

## 2020-11-23 PROCEDURE — 74178 CT ABD&PLV WO CNTR FLWD CNTR: CPT

## 2020-11-23 PROCEDURE — 85651 RBC SED RATE NONAUTOMATED: CPT | Performed by: SPECIALIST

## 2020-11-23 RX ADMIN — SUCRALFATE 1 G: 1 TABLET ORAL at 21:50

## 2020-11-23 RX ADMIN — CARVEDILOL 3.12 MG: 3.12 TABLET, FILM COATED ORAL at 17:59

## 2020-11-23 RX ADMIN — RAMIPRIL 10 MG: 5 CAPSULE ORAL at 08:24

## 2020-11-23 RX ADMIN — METRONIDAZOLE 500 MG: 500 INJECTION, SOLUTION INTRAVENOUS at 05:57

## 2020-11-23 RX ADMIN — ENOXAPARIN SODIUM 40 MG: 100 INJECTION SUBCUTANEOUS at 11:01

## 2020-11-23 RX ADMIN — THIAMINE HYDROCHLORIDE 100 MG: 100 INJECTION, SOLUTION INTRAMUSCULAR; INTRAVENOUS at 08:25

## 2020-11-23 RX ADMIN — TAZOBACTAM SODIUM AND PIPERACILLIN SODIUM 3.38 G: 375; 3 INJECTION, SOLUTION INTRAVENOUS at 03:49

## 2020-11-23 RX ADMIN — TAZOBACTAM SODIUM AND PIPERACILLIN SODIUM 3.38 G: 375; 3 INJECTION, SOLUTION INTRAVENOUS at 18:00

## 2020-11-23 RX ADMIN — Medication 800 MCG: at 11:00

## 2020-11-23 RX ADMIN — METRONIDAZOLE 500 MG: 500 INJECTION, SOLUTION INTRAVENOUS at 18:11

## 2020-11-23 RX ADMIN — CARVEDILOL 3.12 MG: 3.12 TABLET, FILM COATED ORAL at 08:24

## 2020-11-23 RX ADMIN — SUCRALFATE 1 G: 1 TABLET ORAL at 08:25

## 2020-11-23 RX ADMIN — METRONIDAZOLE 500 MG: 500 INJECTION, SOLUTION INTRAVENOUS at 23:45

## 2020-11-23 RX ADMIN — METRONIDAZOLE 500 MG: 500 INJECTION, SOLUTION INTRAVENOUS at 11:00

## 2020-11-23 RX ADMIN — PANTOPRAZOLE SODIUM 40 MG: 40 TABLET, DELAYED RELEASE ORAL at 08:25

## 2020-11-23 RX ADMIN — SUCRALFATE 1 G: 1 TABLET ORAL at 17:59

## 2020-11-23 RX ADMIN — SODIUM CHLORIDE, POTASSIUM CHLORIDE, SODIUM LACTATE AND CALCIUM CHLORIDE 50 ML/HR: 600; 310; 30; 20 INJECTION, SOLUTION INTRAVENOUS at 07:50

## 2020-11-23 RX ADMIN — LORAZEPAM 1 MG: 2 INJECTION INTRAMUSCULAR; INTRAVENOUS at 21:50

## 2020-11-23 RX ADMIN — TAZOBACTAM SODIUM AND PIPERACILLIN SODIUM 3.38 G: 375; 3 INJECTION, SOLUTION INTRAVENOUS at 10:57

## 2020-11-23 RX ADMIN — IOPAMIDOL 100 ML: 612 INJECTION, SOLUTION INTRAVENOUS at 13:16

## 2020-11-23 RX ADMIN — IOPAMIDOL 50 ML: 612 INJECTION, SOLUTION INTRAVENOUS at 10:00

## 2020-11-23 RX ADMIN — SODIUM CHLORIDE, PRESERVATIVE FREE 10 ML: 5 INJECTION INTRAVENOUS at 21:51

## 2020-11-23 RX ADMIN — SUCRALFATE 1 G: 1 TABLET ORAL at 10:57

## 2020-11-24 LAB
ALBUMIN SERPL-MCNC: 3.2 G/DL (ref 3.5–5.2)
ALBUMIN/GLOB SERPL: 1.1 G/DL
ALP SERPL-CCNC: 65 U/L (ref 39–117)
ALT SERPL W P-5'-P-CCNC: 27 U/L (ref 1–41)
ANION GAP SERPL CALCULATED.3IONS-SCNC: 7 MMOL/L (ref 5–15)
AST SERPL-CCNC: 42 U/L (ref 1–40)
BASOPHILS # BLD AUTO: 0.03 10*3/MM3 (ref 0–0.2)
BASOPHILS NFR BLD AUTO: 0.4 % (ref 0–1.5)
BILIRUB SERPL-MCNC: 0.2 MG/DL (ref 0–1.2)
BUN SERPL-MCNC: 12 MG/DL (ref 6–20)
BUN/CREAT SERPL: 15 (ref 7–25)
CALCIUM SPEC-SCNC: 9.5 MG/DL (ref 8.6–10.5)
CHLORIDE SERPL-SCNC: 106 MMOL/L (ref 98–107)
CO2 SERPL-SCNC: 28 MMOL/L (ref 22–29)
CREAT SERPL-MCNC: 0.8 MG/DL (ref 0.76–1.27)
CRP SERPL-MCNC: 0.69 MG/DL (ref 0–0.5)
DEPRECATED RDW RBC AUTO: 41.8 FL (ref 37–54)
EOSINOPHIL # BLD AUTO: 0.42 10*3/MM3 (ref 0–0.4)
EOSINOPHIL NFR BLD AUTO: 6.3 % (ref 0.3–6.2)
ERYTHROCYTE [DISTWIDTH] IN BLOOD BY AUTOMATED COUNT: 12.8 % (ref 12.3–15.4)
ERYTHROCYTE [SEDIMENTATION RATE] IN BLOOD: 26 MM/HR (ref 0–15)
GFR SERPL CREATININE-BSD FRML MDRD: 99 ML/MIN/1.73
GLOBULIN UR ELPH-MCNC: 2.8 GM/DL
GLUCOSE SERPL-MCNC: 101 MG/DL (ref 65–99)
HCT VFR BLD AUTO: 38.2 % (ref 37.5–51)
HGB BLD-MCNC: 13.1 G/DL (ref 13–17.7)
IMM GRANULOCYTES # BLD AUTO: 0.05 10*3/MM3 (ref 0–0.05)
IMM GRANULOCYTES NFR BLD AUTO: 0.7 % (ref 0–0.5)
LYMPHOCYTES # BLD AUTO: 1.66 10*3/MM3 (ref 0.7–3.1)
LYMPHOCYTES NFR BLD AUTO: 24.8 % (ref 19.6–45.3)
MCH RBC QN AUTO: 30.8 PG (ref 26.6–33)
MCHC RBC AUTO-ENTMCNC: 34.3 G/DL (ref 31.5–35.7)
MCV RBC AUTO: 89.7 FL (ref 79–97)
MONOCYTES # BLD AUTO: 0.49 10*3/MM3 (ref 0.1–0.9)
MONOCYTES NFR BLD AUTO: 7.3 % (ref 5–12)
NEUTROPHILS NFR BLD AUTO: 4.04 10*3/MM3 (ref 1.7–7)
NEUTROPHILS NFR BLD AUTO: 60.5 % (ref 42.7–76)
NRBC BLD AUTO-RTO: 0 /100 WBC (ref 0–0.2)
PLATELET # BLD AUTO: 278 10*3/MM3 (ref 140–450)
PMV BLD AUTO: 9.1 FL (ref 6–12)
POTASSIUM SERPL-SCNC: 4 MMOL/L (ref 3.5–5.2)
PROT SERPL-MCNC: 6 G/DL (ref 6–8.5)
RBC # BLD AUTO: 4.26 10*6/MM3 (ref 4.14–5.8)
SODIUM SERPL-SCNC: 141 MMOL/L (ref 136–145)
WBC # BLD AUTO: 6.69 10*3/MM3 (ref 3.4–10.8)

## 2020-11-24 PROCEDURE — 25010000002 THIAMINE PER 100 MG: Performed by: FAMILY MEDICINE

## 2020-11-24 PROCEDURE — 25010000002 PIPERACILLIN SOD-TAZOBACTAM PER 1 G: Performed by: SPECIALIST

## 2020-11-24 PROCEDURE — 25010000002 ENOXAPARIN PER 10 MG: Performed by: FAMILY MEDICINE

## 2020-11-24 PROCEDURE — 86140 C-REACTIVE PROTEIN: CPT | Performed by: SPECIALIST

## 2020-11-24 PROCEDURE — 85025 COMPLETE CBC W/AUTO DIFF WBC: CPT | Performed by: FAMILY MEDICINE

## 2020-11-24 PROCEDURE — 80053 COMPREHEN METABOLIC PANEL: CPT | Performed by: SPECIALIST

## 2020-11-24 PROCEDURE — 85651 RBC SED RATE NONAUTOMATED: CPT | Performed by: SPECIALIST

## 2020-11-24 RX ORDER — ACETAMINOPHEN 500 MG
500 TABLET ORAL EVERY 6 HOURS PRN
Status: DISCONTINUED | OUTPATIENT
Start: 2020-11-24 | End: 2020-11-27 | Stop reason: HOSPADM

## 2020-11-24 RX ORDER — ACETAMINOPHEN 500 MG
1000 TABLET ORAL EVERY 6 HOURS PRN
Status: DISCONTINUED | OUTPATIENT
Start: 2020-11-24 | End: 2020-11-27 | Stop reason: HOSPADM

## 2020-11-24 RX ADMIN — METRONIDAZOLE 500 MG: 500 INJECTION, SOLUTION INTRAVENOUS at 11:11

## 2020-11-24 RX ADMIN — CARVEDILOL 3.12 MG: 3.12 TABLET, FILM COATED ORAL at 17:15

## 2020-11-24 RX ADMIN — Medication 800 MCG: at 11:11

## 2020-11-24 RX ADMIN — SUCRALFATE 1 G: 1 TABLET ORAL at 21:56

## 2020-11-24 RX ADMIN — TAZOBACTAM SODIUM AND PIPERACILLIN SODIUM 3.38 G: 375; 3 INJECTION, SOLUTION INTRAVENOUS at 03:47

## 2020-11-24 RX ADMIN — TAZOBACTAM SODIUM AND PIPERACILLIN SODIUM 3.38 G: 375; 3 INJECTION, SOLUTION INTRAVENOUS at 11:11

## 2020-11-24 RX ADMIN — METRONIDAZOLE 500 MG: 500 INJECTION, SOLUTION INTRAVENOUS at 23:40

## 2020-11-24 RX ADMIN — TAZOBACTAM SODIUM AND PIPERACILLIN SODIUM 3.38 G: 375; 3 INJECTION, SOLUTION INTRAVENOUS at 18:32

## 2020-11-24 RX ADMIN — METRONIDAZOLE 500 MG: 500 INJECTION, SOLUTION INTRAVENOUS at 17:15

## 2020-11-24 RX ADMIN — CARVEDILOL 3.12 MG: 3.12 TABLET, FILM COATED ORAL at 07:40

## 2020-11-24 RX ADMIN — SUCRALFATE 1 G: 1 TABLET ORAL at 17:15

## 2020-11-24 RX ADMIN — METRONIDAZOLE 500 MG: 500 INJECTION, SOLUTION INTRAVENOUS at 06:20

## 2020-11-24 RX ADMIN — ENOXAPARIN SODIUM 40 MG: 100 INJECTION SUBCUTANEOUS at 11:11

## 2020-11-24 RX ADMIN — SODIUM CHLORIDE, POTASSIUM CHLORIDE, SODIUM LACTATE AND CALCIUM CHLORIDE 50 ML/HR: 600; 310; 30; 20 INJECTION, SOLUTION INTRAVENOUS at 08:31

## 2020-11-24 RX ADMIN — RAMIPRIL 10 MG: 5 CAPSULE ORAL at 08:31

## 2020-11-24 RX ADMIN — THIAMINE HYDROCHLORIDE 100 MG: 100 INJECTION, SOLUTION INTRAMUSCULAR; INTRAVENOUS at 08:31

## 2020-11-24 RX ADMIN — SUCRALFATE 1 G: 1 TABLET ORAL at 11:11

## 2020-11-24 RX ADMIN — PANTOPRAZOLE SODIUM 40 MG: 40 TABLET, DELAYED RELEASE ORAL at 08:31

## 2020-11-24 RX ADMIN — ACETAMINOPHEN 500 MG: 500 TABLET, FILM COATED ORAL at 05:21

## 2020-11-24 RX ADMIN — SUCRALFATE 1 G: 1 TABLET ORAL at 07:40

## 2020-11-25 PROBLEM — K35.32 RUPTURED APPENDICITIS: Status: ACTIVE | Noted: 2020-11-25

## 2020-11-25 LAB
ANION GAP SERPL CALCULATED.3IONS-SCNC: 6 MMOL/L (ref 5–15)
BASOPHILS # BLD AUTO: 0.03 10*3/MM3 (ref 0–0.2)
BASOPHILS NFR BLD AUTO: 0.4 % (ref 0–1.5)
BUN SERPL-MCNC: 11 MG/DL (ref 6–20)
BUN/CREAT SERPL: 12.5 (ref 7–25)
CALCIUM SPEC-SCNC: 9.3 MG/DL (ref 8.6–10.5)
CHLORIDE SERPL-SCNC: 106 MMOL/L (ref 98–107)
CO2 SERPL-SCNC: 27 MMOL/L (ref 22–29)
CREAT SERPL-MCNC: 0.88 MG/DL (ref 0.76–1.27)
DEPRECATED RDW RBC AUTO: 41.4 FL (ref 37–54)
EOSINOPHIL # BLD AUTO: 0.4 10*3/MM3 (ref 0–0.4)
EOSINOPHIL NFR BLD AUTO: 5.8 % (ref 0.3–6.2)
ERYTHROCYTE [DISTWIDTH] IN BLOOD BY AUTOMATED COUNT: 12.6 % (ref 12.3–15.4)
GFR SERPL CREATININE-BSD FRML MDRD: 89 ML/MIN/1.73
GLUCOSE SERPL-MCNC: 98 MG/DL (ref 65–99)
HCT VFR BLD AUTO: 39.9 % (ref 37.5–51)
HGB BLD-MCNC: 13.4 G/DL (ref 13–17.7)
IMM GRANULOCYTES # BLD AUTO: 0.06 10*3/MM3 (ref 0–0.05)
IMM GRANULOCYTES NFR BLD AUTO: 0.9 % (ref 0–0.5)
LYMPHOCYTES # BLD AUTO: 1.51 10*3/MM3 (ref 0.7–3.1)
LYMPHOCYTES NFR BLD AUTO: 21.9 % (ref 19.6–45.3)
MCH RBC QN AUTO: 30.5 PG (ref 26.6–33)
MCHC RBC AUTO-ENTMCNC: 33.6 G/DL (ref 31.5–35.7)
MCV RBC AUTO: 90.9 FL (ref 79–97)
MONOCYTES # BLD AUTO: 0.42 10*3/MM3 (ref 0.1–0.9)
MONOCYTES NFR BLD AUTO: 6.1 % (ref 5–12)
NEUTROPHILS NFR BLD AUTO: 4.46 10*3/MM3 (ref 1.7–7)
NEUTROPHILS NFR BLD AUTO: 64.9 % (ref 42.7–76)
NRBC BLD AUTO-RTO: 0 /100 WBC (ref 0–0.2)
PLATELET # BLD AUTO: 303 10*3/MM3 (ref 140–450)
PMV BLD AUTO: 9.1 FL (ref 6–12)
POTASSIUM SERPL-SCNC: 4 MMOL/L (ref 3.5–5.2)
RBC # BLD AUTO: 4.39 10*6/MM3 (ref 4.14–5.8)
SODIUM SERPL-SCNC: 139 MMOL/L (ref 136–145)
WBC # BLD AUTO: 6.88 10*3/MM3 (ref 3.4–10.8)

## 2020-11-25 PROCEDURE — 25010000002 LORAZEPAM PER 2 MG: Performed by: SPECIALIST

## 2020-11-25 PROCEDURE — 25010000002 THIAMINE PER 100 MG: Performed by: FAMILY MEDICINE

## 2020-11-25 PROCEDURE — 25010000002 PIPERACILLIN SOD-TAZOBACTAM PER 1 G: Performed by: SPECIALIST

## 2020-11-25 PROCEDURE — 80048 BASIC METABOLIC PNL TOTAL CA: CPT | Performed by: FAMILY MEDICINE

## 2020-11-25 PROCEDURE — 25010000002 ENOXAPARIN PER 10 MG: Performed by: FAMILY MEDICINE

## 2020-11-25 PROCEDURE — 85025 COMPLETE CBC W/AUTO DIFF WBC: CPT | Performed by: FAMILY MEDICINE

## 2020-11-25 RX ADMIN — METRONIDAZOLE 500 MG: 500 INJECTION, SOLUTION INTRAVENOUS at 17:26

## 2020-11-25 RX ADMIN — SUCRALFATE 1 G: 1 TABLET ORAL at 11:30

## 2020-11-25 RX ADMIN — TAZOBACTAM SODIUM AND PIPERACILLIN SODIUM 3.38 G: 375; 3 INJECTION, SOLUTION INTRAVENOUS at 02:31

## 2020-11-25 RX ADMIN — SUCRALFATE 1 G: 1 TABLET ORAL at 17:18

## 2020-11-25 RX ADMIN — CARVEDILOL 3.12 MG: 3.12 TABLET, FILM COATED ORAL at 08:08

## 2020-11-25 RX ADMIN — METRONIDAZOLE 500 MG: 500 INJECTION, SOLUTION INTRAVENOUS at 05:37

## 2020-11-25 RX ADMIN — METRONIDAZOLE 500 MG: 500 INJECTION, SOLUTION INTRAVENOUS at 11:30

## 2020-11-25 RX ADMIN — SUCRALFATE 1 G: 1 TABLET ORAL at 20:21

## 2020-11-25 RX ADMIN — SODIUM CHLORIDE, POTASSIUM CHLORIDE, SODIUM LACTATE AND CALCIUM CHLORIDE 50 ML/HR: 600; 310; 30; 20 INJECTION, SOLUTION INTRAVENOUS at 12:48

## 2020-11-25 RX ADMIN — METRONIDAZOLE 500 MG: 500 INJECTION, SOLUTION INTRAVENOUS at 23:35

## 2020-11-25 RX ADMIN — TAZOBACTAM SODIUM AND PIPERACILLIN SODIUM 3.38 G: 375; 3 INJECTION, SOLUTION INTRAVENOUS at 19:33

## 2020-11-25 RX ADMIN — LORAZEPAM 1 MG: 2 INJECTION INTRAMUSCULAR; INTRAVENOUS at 22:42

## 2020-11-25 RX ADMIN — LORAZEPAM 1 MG: 2 INJECTION INTRAMUSCULAR; INTRAVENOUS at 04:17

## 2020-11-25 RX ADMIN — CARVEDILOL 3.12 MG: 3.12 TABLET, FILM COATED ORAL at 17:18

## 2020-11-25 RX ADMIN — SUCRALFATE 1 G: 1 TABLET ORAL at 08:08

## 2020-11-25 RX ADMIN — ENOXAPARIN SODIUM 40 MG: 100 INJECTION SUBCUTANEOUS at 11:30

## 2020-11-25 RX ADMIN — RAMIPRIL 10 MG: 5 CAPSULE ORAL at 08:07

## 2020-11-25 RX ADMIN — THIAMINE HYDROCHLORIDE 100 MG: 100 INJECTION, SOLUTION INTRAMUSCULAR; INTRAVENOUS at 08:08

## 2020-11-25 RX ADMIN — Medication 800 MCG: at 11:30

## 2020-11-25 RX ADMIN — TAZOBACTAM SODIUM AND PIPERACILLIN SODIUM 3.38 G: 375; 3 INJECTION, SOLUTION INTRAVENOUS at 10:36

## 2020-11-25 RX ADMIN — PANTOPRAZOLE SODIUM 40 MG: 40 TABLET, DELAYED RELEASE ORAL at 08:08

## 2020-11-26 LAB
ALBUMIN SERPL-MCNC: 3.3 G/DL (ref 3.5–5.2)
ALBUMIN/GLOB SERPL: 1.1 G/DL
ALP SERPL-CCNC: 71 U/L (ref 39–117)
ALT SERPL W P-5'-P-CCNC: 30 U/L (ref 1–41)
ANION GAP SERPL CALCULATED.3IONS-SCNC: 6 MMOL/L (ref 5–15)
AST SERPL-CCNC: 33 U/L (ref 1–40)
BASOPHILS # BLD AUTO: 0.04 10*3/MM3 (ref 0–0.2)
BASOPHILS NFR BLD AUTO: 0.7 % (ref 0–1.5)
BILIRUB SERPL-MCNC: <0.2 MG/DL (ref 0–1.2)
BUN SERPL-MCNC: 12 MG/DL (ref 6–20)
BUN/CREAT SERPL: 11.7 (ref 7–25)
CALCIUM SPEC-SCNC: 9.4 MG/DL (ref 8.6–10.5)
CHLORIDE SERPL-SCNC: 106 MMOL/L (ref 98–107)
CO2 SERPL-SCNC: 28 MMOL/L (ref 22–29)
CREAT SERPL-MCNC: 1.03 MG/DL (ref 0.76–1.27)
CRP SERPL-MCNC: 0.37 MG/DL (ref 0–0.5)
DEPRECATED RDW RBC AUTO: 43.1 FL (ref 37–54)
EOSINOPHIL # BLD AUTO: 0.41 10*3/MM3 (ref 0–0.4)
EOSINOPHIL NFR BLD AUTO: 6.9 % (ref 0.3–6.2)
ERYTHROCYTE [DISTWIDTH] IN BLOOD BY AUTOMATED COUNT: 12.8 % (ref 12.3–15.4)
ERYTHROCYTE [SEDIMENTATION RATE] IN BLOOD: 22 MM/HR (ref 0–15)
GFR SERPL CREATININE-BSD FRML MDRD: 74 ML/MIN/1.73
GLOBULIN UR ELPH-MCNC: 3.1 GM/DL
GLUCOSE SERPL-MCNC: 97 MG/DL (ref 65–99)
HCT VFR BLD AUTO: 40.7 % (ref 37.5–51)
HGB BLD-MCNC: 13.6 G/DL (ref 13–17.7)
IMM GRANULOCYTES # BLD AUTO: 0.04 10*3/MM3 (ref 0–0.05)
IMM GRANULOCYTES NFR BLD AUTO: 0.7 % (ref 0–0.5)
LYMPHOCYTES # BLD AUTO: 1.73 10*3/MM3 (ref 0.7–3.1)
LYMPHOCYTES NFR BLD AUTO: 29.1 % (ref 19.6–45.3)
MCH RBC QN AUTO: 30.6 PG (ref 26.6–33)
MCHC RBC AUTO-ENTMCNC: 33.4 G/DL (ref 31.5–35.7)
MCV RBC AUTO: 91.5 FL (ref 79–97)
MONOCYTES # BLD AUTO: 0.52 10*3/MM3 (ref 0.1–0.9)
MONOCYTES NFR BLD AUTO: 8.7 % (ref 5–12)
NEUTROPHILS NFR BLD AUTO: 3.21 10*3/MM3 (ref 1.7–7)
NEUTROPHILS NFR BLD AUTO: 53.9 % (ref 42.7–76)
NRBC BLD AUTO-RTO: 0 /100 WBC (ref 0–0.2)
PLATELET # BLD AUTO: 297 10*3/MM3 (ref 140–450)
PMV BLD AUTO: 9.1 FL (ref 6–12)
POTASSIUM SERPL-SCNC: 4.3 MMOL/L (ref 3.5–5.2)
PROT SERPL-MCNC: 6.4 G/DL (ref 6–8.5)
RBC # BLD AUTO: 4.45 10*6/MM3 (ref 4.14–5.8)
SODIUM SERPL-SCNC: 140 MMOL/L (ref 136–145)
WBC # BLD AUTO: 5.95 10*3/MM3 (ref 3.4–10.8)

## 2020-11-26 PROCEDURE — 25010000002 THIAMINE PER 100 MG: Performed by: FAMILY MEDICINE

## 2020-11-26 PROCEDURE — 80053 COMPREHEN METABOLIC PANEL: CPT | Performed by: SPECIALIST

## 2020-11-26 PROCEDURE — 85025 COMPLETE CBC W/AUTO DIFF WBC: CPT | Performed by: FAMILY MEDICINE

## 2020-11-26 PROCEDURE — 25010000002 PIPERACILLIN SOD-TAZOBACTAM PER 1 G: Performed by: SPECIALIST

## 2020-11-26 PROCEDURE — 25010000002 ENOXAPARIN PER 10 MG: Performed by: FAMILY MEDICINE

## 2020-11-26 PROCEDURE — 25010000002 LORAZEPAM PER 2 MG: Performed by: SPECIALIST

## 2020-11-26 PROCEDURE — 85651 RBC SED RATE NONAUTOMATED: CPT | Performed by: SPECIALIST

## 2020-11-26 PROCEDURE — 86140 C-REACTIVE PROTEIN: CPT | Performed by: SPECIALIST

## 2020-11-26 RX ADMIN — METRONIDAZOLE 500 MG: 500 INJECTION, SOLUTION INTRAVENOUS at 17:48

## 2020-11-26 RX ADMIN — PANTOPRAZOLE SODIUM 40 MG: 40 TABLET, DELAYED RELEASE ORAL at 08:17

## 2020-11-26 RX ADMIN — CARVEDILOL 3.12 MG: 3.12 TABLET, FILM COATED ORAL at 08:16

## 2020-11-26 RX ADMIN — TAZOBACTAM SODIUM AND PIPERACILLIN SODIUM 3.38 G: 375; 3 INJECTION, SOLUTION INTRAVENOUS at 03:54

## 2020-11-26 RX ADMIN — RAMIPRIL 10 MG: 5 CAPSULE ORAL at 08:16

## 2020-11-26 RX ADMIN — SUCRALFATE 1 G: 1 TABLET ORAL at 17:48

## 2020-11-26 RX ADMIN — LORAZEPAM 1 MG: 2 INJECTION INTRAMUSCULAR; INTRAVENOUS at 23:00

## 2020-11-26 RX ADMIN — METRONIDAZOLE 500 MG: 500 INJECTION, SOLUTION INTRAVENOUS at 11:40

## 2020-11-26 RX ADMIN — THIAMINE HYDROCHLORIDE 100 MG: 100 INJECTION, SOLUTION INTRAMUSCULAR; INTRAVENOUS at 08:17

## 2020-11-26 RX ADMIN — Medication 800 MCG: at 11:40

## 2020-11-26 RX ADMIN — CARVEDILOL 3.12 MG: 3.12 TABLET, FILM COATED ORAL at 17:48

## 2020-11-26 RX ADMIN — METRONIDAZOLE 500 MG: 500 INJECTION, SOLUTION INTRAVENOUS at 23:00

## 2020-11-26 RX ADMIN — SUCRALFATE 1 G: 1 TABLET ORAL at 08:16

## 2020-11-26 RX ADMIN — METRONIDAZOLE 500 MG: 500 INJECTION, SOLUTION INTRAVENOUS at 05:16

## 2020-11-26 RX ADMIN — ENOXAPARIN SODIUM 40 MG: 100 INJECTION SUBCUTANEOUS at 11:40

## 2020-11-26 RX ADMIN — SUCRALFATE 1 G: 1 TABLET ORAL at 20:25

## 2020-11-26 RX ADMIN — SODIUM CHLORIDE, POTASSIUM CHLORIDE, SODIUM LACTATE AND CALCIUM CHLORIDE 50 ML/HR: 600; 310; 30; 20 INJECTION, SOLUTION INTRAVENOUS at 17:48

## 2020-11-26 RX ADMIN — SUCRALFATE 1 G: 1 TABLET ORAL at 11:43

## 2020-11-26 RX ADMIN — TAZOBACTAM SODIUM AND PIPERACILLIN SODIUM 3.38 G: 375; 3 INJECTION, SOLUTION INTRAVENOUS at 18:30

## 2020-11-26 RX ADMIN — TAZOBACTAM SODIUM AND PIPERACILLIN SODIUM 3.38 G: 375; 3 INJECTION, SOLUTION INTRAVENOUS at 11:40

## 2020-11-26 RX ADMIN — SODIUM CHLORIDE, PRESERVATIVE FREE 10 ML: 5 INJECTION INTRAVENOUS at 20:25

## 2020-11-27 VITALS
BODY MASS INDEX: 27.15 KG/M2 | RESPIRATION RATE: 16 BRPM | OXYGEN SATURATION: 98 % | WEIGHT: 173 LBS | HEIGHT: 67 IN | DIASTOLIC BLOOD PRESSURE: 83 MMHG | HEART RATE: 65 BPM | SYSTOLIC BLOOD PRESSURE: 138 MMHG | TEMPERATURE: 97.8 F

## 2020-11-27 LAB
ANION GAP SERPL CALCULATED.3IONS-SCNC: 7 MMOL/L (ref 5–15)
BASOPHILS # BLD AUTO: 0.03 10*3/MM3 (ref 0–0.2)
BASOPHILS NFR BLD AUTO: 0.5 % (ref 0–1.5)
BUN SERPL-MCNC: 11 MG/DL (ref 6–20)
BUN/CREAT SERPL: 12.1 (ref 7–25)
CALCIUM SPEC-SCNC: 9.4 MG/DL (ref 8.6–10.5)
CHLORIDE SERPL-SCNC: 106 MMOL/L (ref 98–107)
CO2 SERPL-SCNC: 27 MMOL/L (ref 22–29)
CREAT SERPL-MCNC: 0.91 MG/DL (ref 0.76–1.27)
DEPRECATED RDW RBC AUTO: 42.7 FL (ref 37–54)
EOSINOPHIL # BLD AUTO: 0.33 10*3/MM3 (ref 0–0.4)
EOSINOPHIL NFR BLD AUTO: 5.8 % (ref 0.3–6.2)
ERYTHROCYTE [DISTWIDTH] IN BLOOD BY AUTOMATED COUNT: 13 % (ref 12.3–15.4)
GFR SERPL CREATININE-BSD FRML MDRD: 85 ML/MIN/1.73
GLUCOSE SERPL-MCNC: 84 MG/DL (ref 65–99)
HCT VFR BLD AUTO: 40.1 % (ref 37.5–51)
HGB BLD-MCNC: 13.6 G/DL (ref 13–17.7)
IMM GRANULOCYTES # BLD AUTO: 0.02 10*3/MM3 (ref 0–0.05)
IMM GRANULOCYTES NFR BLD AUTO: 0.4 % (ref 0–0.5)
LYMPHOCYTES # BLD AUTO: 1.87 10*3/MM3 (ref 0.7–3.1)
LYMPHOCYTES NFR BLD AUTO: 33 % (ref 19.6–45.3)
MCH RBC QN AUTO: 30.6 PG (ref 26.6–33)
MCHC RBC AUTO-ENTMCNC: 33.9 G/DL (ref 31.5–35.7)
MCV RBC AUTO: 90.1 FL (ref 79–97)
MONOCYTES # BLD AUTO: 0.54 10*3/MM3 (ref 0.1–0.9)
MONOCYTES NFR BLD AUTO: 9.5 % (ref 5–12)
NEUTROPHILS NFR BLD AUTO: 2.88 10*3/MM3 (ref 1.7–7)
NEUTROPHILS NFR BLD AUTO: 50.8 % (ref 42.7–76)
NRBC BLD AUTO-RTO: 0 /100 WBC (ref 0–0.2)
PLATELET # BLD AUTO: 317 10*3/MM3 (ref 140–450)
PMV BLD AUTO: 9.1 FL (ref 6–12)
POTASSIUM SERPL-SCNC: 4 MMOL/L (ref 3.5–5.2)
RBC # BLD AUTO: 4.45 10*6/MM3 (ref 4.14–5.8)
SODIUM SERPL-SCNC: 140 MMOL/L (ref 136–145)
WBC # BLD AUTO: 5.67 10*3/MM3 (ref 3.4–10.8)

## 2020-11-27 PROCEDURE — 80048 BASIC METABOLIC PNL TOTAL CA: CPT | Performed by: FAMILY MEDICINE

## 2020-11-27 PROCEDURE — 25010000002 PIPERACILLIN SOD-TAZOBACTAM PER 1 G: Performed by: SPECIALIST

## 2020-11-27 PROCEDURE — 85025 COMPLETE CBC W/AUTO DIFF WBC: CPT | Performed by: FAMILY MEDICINE

## 2020-11-27 RX ORDER — HYDROCODONE BITARTRATE AND ACETAMINOPHEN 7.5; 325 MG/1; MG/1
1 TABLET ORAL EVERY 4 HOURS PRN
Qty: 12 TABLET | Refills: 0 | Status: ON HOLD | OUTPATIENT
Start: 2020-11-27 | End: 2021-08-11

## 2020-11-27 RX ORDER — PANTOPRAZOLE SODIUM 40 MG/1
40 TABLET, DELAYED RELEASE ORAL DAILY
Qty: 30 TABLET | Refills: 0 | Status: ON HOLD | OUTPATIENT
Start: 2020-11-27 | End: 2021-08-11

## 2020-11-27 RX ADMIN — TAZOBACTAM SODIUM AND PIPERACILLIN SODIUM 3.38 G: 375; 3 INJECTION, SOLUTION INTRAVENOUS at 03:54

## 2020-11-27 RX ADMIN — TAZOBACTAM SODIUM AND PIPERACILLIN SODIUM 3.38 G: 375; 3 INJECTION, SOLUTION INTRAVENOUS at 10:25

## 2020-11-27 RX ADMIN — RAMIPRIL 10 MG: 5 CAPSULE ORAL at 10:22

## 2020-11-27 RX ADMIN — METRONIDAZOLE 500 MG: 500 INJECTION, SOLUTION INTRAVENOUS at 05:11

## 2020-11-27 RX ADMIN — SUCRALFATE 1 G: 1 TABLET ORAL at 10:20

## 2020-11-27 RX ADMIN — CARVEDILOL 3.12 MG: 3.12 TABLET, FILM COATED ORAL at 10:21

## 2020-11-28 ENCOUNTER — READMISSION MANAGEMENT (OUTPATIENT)
Dept: CALL CENTER | Facility: HOSPITAL | Age: 60
End: 2020-11-28

## 2020-11-28 NOTE — OUTREACH NOTE
Prep Survey      Responses   [facility] [place] facility patient discharged from?  [place]   Is LACE score < 7 ?  No   Eligibility  Readm Mgmt   Discharge diagnosis  Ileus    Does the patient have one of the following disease processes/diagnoses(primary or secondary)?  Other   Does the patient have Home health ordered?  No   Is there a DME ordered?  No   Prep survey completed?  Yes          Leona Ascencio RN

## 2020-12-01 ENCOUNTER — READMISSION MANAGEMENT (OUTPATIENT)
Dept: CALL CENTER | Facility: HOSPITAL | Age: 60
End: 2020-12-01

## 2020-12-01 NOTE — OUTREACH NOTE
Medical Week 1 Survey      Responses   [facility] patient discharged from?  [place]   Does the patient have one of the following disease processes/diagnoses(primary or secondary)?  Other   Week 1 attempt successful?  Yes   Call start time  1412   Call end time  1425   Discharge diagnosis  Ileus    Meds reviewed with patient/caregiver?  Yes   Is the patient having any side effects they believe may be caused by any medication additions or changes?  No   Does the patient have all medications ordered at discharge?  Yes   Is the patient taking all medications as directed (includes completed medication regime)?  Yes   Does the patient have a primary care provider?   Yes   Does the patient have an appointment with their PCP within 7 days of discharge?  Yes   Has the patient kept scheduled appointments due by today?  N/A   Has home health visited the patient within 72 hours of discharge?  N/A   Psychosocial issues?  No   Did the patient receive a copy of their discharge instructions?  Yes   Nursing interventions  Reviewed instructions with patient   What is the patient's perception of their health status since discharge?  Improving   Is the patient/caregiver able to teach back signs and symptoms related to disease process for when to call PCP?  Yes   Is the patient/caregiver able to teach back signs and symptoms related to disease process for when to call 911?  Yes   Is the patient/caregiver able to teach back the hierarchy of who to call/visit for symptoms/problems? PCP, Specialist, Home health nurse, Urgent Care, ED, 911  Yes   If the patient is a current smoker, are they able to teach back resources for cessation?  Not a smoker   Additional teach back comments  Encouraged f/u w/ surgeon this week d/t gallbladder pain.  Says it is worse than any other issue right now.   Week 1 call completed?  Yes   Wrap up additional comments  Will call for surgery f/u appt this week. PCP two weeks from now.          Isis IBARRA  Lionel RN

## 2020-12-07 ENCOUNTER — TRANSCRIBE ORDERS (OUTPATIENT)
Dept: ADMINISTRATIVE | Facility: HOSPITAL | Age: 60
End: 2020-12-07

## 2020-12-07 DIAGNOSIS — R10.11 RIGHT UPPER QUADRANT ABDOMINAL PAIN: Primary | ICD-10-CM

## 2020-12-09 ENCOUNTER — HOSPITAL ENCOUNTER (OUTPATIENT)
Dept: ULTRASOUND IMAGING | Facility: HOSPITAL | Age: 60
Discharge: HOME | End: 2020-12-09
Admitting: SPECIALIST

## 2020-12-09 ENCOUNTER — READMISSION MANAGEMENT (OUTPATIENT)
Dept: CALL CENTER | Facility: HOSPITAL | Age: 60
End: 2020-12-09

## 2020-12-09 DIAGNOSIS — R10.11 RIGHT UPPER QUADRANT ABDOMINAL PAIN: ICD-10-CM

## 2020-12-09 PROCEDURE — 76705 ECHO EXAM OF ABDOMEN: CPT

## 2020-12-10 ENCOUNTER — LAB (OUTPATIENT)
Dept: LAB | Facility: HOSPITAL | Age: 60
End: 2020-12-10

## 2020-12-10 DIAGNOSIS — K35.32 RUPTURED APPENDICITIS: ICD-10-CM

## 2020-12-10 LAB
ALBUMIN SERPL-MCNC: 4 G/DL (ref 3.5–5.2)
ALBUMIN/GLOB SERPL: 1.2 G/DL
ALP SERPL-CCNC: 82 U/L (ref 39–117)
ALT SERPL W P-5'-P-CCNC: 20 U/L (ref 1–41)
ANION GAP SERPL CALCULATED.3IONS-SCNC: 10.5 MMOL/L (ref 5–15)
AST SERPL-CCNC: 22 U/L (ref 1–40)
BASOPHILS # BLD AUTO: 0.03 10*3/MM3 (ref 0–0.2)
BASOPHILS NFR BLD AUTO: 0.6 % (ref 0–1.5)
BILIRUB SERPL-MCNC: 0.2 MG/DL (ref 0–1.2)
BUN SERPL-MCNC: 11 MG/DL (ref 6–20)
BUN/CREAT SERPL: 13.1 (ref 7–25)
CALCIUM SPEC-SCNC: 10.1 MG/DL (ref 8.6–10.5)
CHLORIDE SERPL-SCNC: 106 MMOL/L (ref 98–107)
CO2 SERPL-SCNC: 25.5 MMOL/L (ref 22–29)
CREAT SERPL-MCNC: 0.84 MG/DL (ref 0.76–1.27)
CRP SERPL-MCNC: 0.07 MG/DL (ref 0–0.5)
DEPRECATED RDW RBC AUTO: 44.7 FL (ref 37–54)
EOSINOPHIL # BLD AUTO: 0.41 10*3/MM3 (ref 0–0.4)
EOSINOPHIL NFR BLD AUTO: 7.6 % (ref 0.3–6.2)
ERYTHROCYTE [DISTWIDTH] IN BLOOD BY AUTOMATED COUNT: 13.1 % (ref 12.3–15.4)
ERYTHROCYTE [SEDIMENTATION RATE] IN BLOOD: 12 MM/HR (ref 0–20)
GFR SERPL CREATININE-BSD FRML MDRD: 94 ML/MIN/1.73
GLOBULIN UR ELPH-MCNC: 3.4 GM/DL
GLUCOSE SERPL-MCNC: 92 MG/DL (ref 65–99)
HCT VFR BLD AUTO: 40.9 % (ref 37.5–51)
HGB BLD-MCNC: 13.6 G/DL (ref 13–17.7)
IMM GRANULOCYTES # BLD AUTO: 0.01 10*3/MM3 (ref 0–0.05)
IMM GRANULOCYTES NFR BLD AUTO: 0.2 % (ref 0–0.5)
LYMPHOCYTES # BLD AUTO: 1.42 10*3/MM3 (ref 0.7–3.1)
LYMPHOCYTES NFR BLD AUTO: 26.4 % (ref 19.6–45.3)
MCH RBC QN AUTO: 30.8 PG (ref 26.6–33)
MCHC RBC AUTO-ENTMCNC: 33.3 G/DL (ref 31.5–35.7)
MCV RBC AUTO: 92.5 FL (ref 79–97)
MONOCYTES # BLD AUTO: 0.5 10*3/MM3 (ref 0.1–0.9)
MONOCYTES NFR BLD AUTO: 9.3 % (ref 5–12)
NEUTROPHILS NFR BLD AUTO: 3.01 10*3/MM3 (ref 1.7–7)
NEUTROPHILS NFR BLD AUTO: 55.9 % (ref 42.7–76)
NRBC BLD AUTO-RTO: 0 /100 WBC (ref 0–0.2)
PLATELET # BLD AUTO: 248 10*3/MM3 (ref 140–450)
PMV BLD AUTO: 9.9 FL (ref 6–12)
POTASSIUM SERPL-SCNC: 4.4 MMOL/L (ref 3.5–5.2)
PROT SERPL-MCNC: 7.4 G/DL (ref 6–8.5)
RBC # BLD AUTO: 4.42 10*6/MM3 (ref 4.14–5.8)
SODIUM SERPL-SCNC: 142 MMOL/L (ref 136–145)
WBC # BLD AUTO: 5.38 10*3/MM3 (ref 3.4–10.8)

## 2020-12-10 PROCEDURE — 80053 COMPREHEN METABOLIC PANEL: CPT

## 2020-12-10 PROCEDURE — 85025 COMPLETE CBC W/AUTO DIFF WBC: CPT

## 2020-12-10 PROCEDURE — 36415 COLL VENOUS BLD VENIPUNCTURE: CPT

## 2020-12-10 PROCEDURE — 86140 C-REACTIVE PROTEIN: CPT

## 2020-12-10 PROCEDURE — 85652 RBC SED RATE AUTOMATED: CPT

## 2020-12-17 ENCOUNTER — READMISSION MANAGEMENT (OUTPATIENT)
Dept: CALL CENTER | Facility: HOSPITAL | Age: 60
End: 2020-12-17

## 2020-12-17 NOTE — OUTREACH NOTE
Stroke Week 3 Survey      Responses   [facility] patient discharged from?  [place]   Does the patient have one of the following disease processes/diagnoses(primary or secondary)?  Other   Call start time  1441   Call end time  1445   Meds reviewed with patient/caregiver?  Yes   Is the patient having any side effects they believe may be caused by any medication additions or changes?  No   Does the patient have all medications ordered at discharge?  Yes   Is the patient taking all medications as directed (includes completed medication regime)?  Yes   Comments regarding appointments  Has seen Dr. Moore.     Does the patient have a primary care provider?   Yes   Does the patient have an appointment with their PCP within 7 days of discharge?  No   What is preventing the patient from scheduling follow up appointments within 7 days of discharge?  Haven't had time   Nursing Interventions  Advised patient to make appointment   Has the patient kept scheduled appointments due by today?  Yes   Has home health visited the patient within 72 hours of discharge?  No   Did the patient receive a copy of their discharge instructions?  Yes   Nursing interventions  Reviewed instructions with patient   What is the patient's perception of their health status since discharge?  Same   Is the patient/caregiver able to teach back signs and symptoms related to disease process for when to call PCP?  Yes   Is the patient/caregiver able to teach back signs and symptoms related to disease process for when to call 911?  Yes   Is the patient/caregiver able to teach back the hierarchy of who to call/visit for symptoms/problems? PCP, Specialist, Home health nurse, Urgent Care, ED, 911  Yes          Carolin Richey RN

## 2020-12-29 ENCOUNTER — READMISSION MANAGEMENT (OUTPATIENT)
Dept: CALL CENTER | Facility: HOSPITAL | Age: 60
End: 2020-12-29

## 2020-12-29 NOTE — OUTREACH NOTE
Medical Week 4 Survey      Responses   [facility] patient discharged from?  [place]   Does the patient have one of the following disease processes/diagnoses(primary or secondary)?  Other   Week 4 attempt successful?  Yes   Call start time  1141   Call end time  1145   Discharge diagnosis  Ileus    Meds reviewed with patient/caregiver?  Yes   Is the patient having any side effects they believe may be caused by any medication additions or changes?  No   Is the patient taking all medications as directed (includes completed medication regime)?  Yes   Has the patient kept scheduled appointments due by today?  Yes   Is the patient still receiving Home Health Services?  N/A   Psychosocial issues?  No   What is the patient's perception of their health status since discharge?  Improving   Is the patient/caregiver able to teach back signs and symptoms related to disease process for when to call PCP?  Yes   Is the patient/caregiver able to teach back signs and symptoms related to disease process for when to call 911?  Yes   Is the patient/caregiver able to teach back the hierarchy of who to call/visit for symptoms/problems? PCP, Specialist, Home health nurse, Urgent Care, ED, 911  Yes   If the patient is a current smoker, are they able to teach back resources for cessation?  Not a smoker   Additional teach back comments  Gallbladder-maybe a test after the 1st. Lap Appy in January but unknown date. Exercise and water and good food are happening and his gallbladder has calmed down he says.   Week 4 Call Completed?  Yes   Would the patient like one additional call?  No   Graduated  Yes   Is the patient interested in additional calls from an ambulatory ?  NOTE:  applies to high risk patients requiring additional follow-up.  No   Did the patient feel the follow up calls were helpful during their recovery period?  Yes   Was the number of calls appropriate?  Yes   Wrap up additional comments  Improving.           Isis Flowers, RN

## 2021-01-08 ENCOUNTER — TRANSCRIBE ORDERS (OUTPATIENT)
Dept: ADMINISTRATIVE | Facility: HOSPITAL | Age: 61
End: 2021-01-08

## 2021-01-08 DIAGNOSIS — R10.9 ABDOMINAL PAIN, UNSPECIFIED ABDOMINAL LOCATION: Primary | ICD-10-CM

## 2021-01-14 ENCOUNTER — APPOINTMENT (OUTPATIENT)
Dept: NUCLEAR MEDICINE | Facility: HOSPITAL | Age: 61
End: 2021-01-14

## 2021-01-19 ENCOUNTER — HOSPITAL ENCOUNTER (OUTPATIENT)
Dept: NUCLEAR MEDICINE | Facility: HOSPITAL | Age: 61
Discharge: HOME | End: 2021-01-19

## 2021-01-19 DIAGNOSIS — R10.9 ABDOMINAL PAIN, UNSPECIFIED ABDOMINAL LOCATION: ICD-10-CM

## 2021-01-19 PROCEDURE — A9537 TC99M MEBROFENIN: HCPCS | Performed by: SPECIALIST

## 2021-01-19 PROCEDURE — 0 TECHNETIUM TC 99M MEBROFENIN KIT: Performed by: SPECIALIST

## 2021-01-19 PROCEDURE — 78226 HEPATOBILIARY SYSTEM IMAGING: CPT

## 2021-01-19 RX ORDER — KIT FOR THE PREPARATION OF TECHNETIUM TC 99M MEBROFENIN 45 MG/10ML
1 INJECTION, POWDER, LYOPHILIZED, FOR SOLUTION INTRAVENOUS
Status: COMPLETED | OUTPATIENT
Start: 2021-01-19 | End: 2021-01-19

## 2021-01-19 RX ADMIN — MEBROFENIN 1 DOSE: 45 INJECTION, POWDER, LYOPHILIZED, FOR SOLUTION INTRAVENOUS at 08:15

## 2021-08-10 ENCOUNTER — HOSPITAL ENCOUNTER (INPATIENT)
Facility: HOSPITAL | Age: 61
LOS: 3 days | Discharge: HOME | End: 2021-08-13
Attending: INTERNAL MEDICINE | Admitting: THORACIC SURGERY (CARDIOTHORACIC VASCULAR SURGERY)

## 2021-08-10 ENCOUNTER — APPOINTMENT (OUTPATIENT)
Dept: CARDIOLOGY | Facility: HOSPITAL | Age: 61
End: 2021-08-10

## 2021-08-10 ENCOUNTER — APPOINTMENT (OUTPATIENT)
Dept: ULTRASOUND IMAGING | Facility: HOSPITAL | Age: 61
End: 2021-08-10

## 2021-08-10 ENCOUNTER — APPOINTMENT (OUTPATIENT)
Dept: GENERAL RADIOLOGY | Facility: HOSPITAL | Age: 61
End: 2021-08-10

## 2021-08-10 ENCOUNTER — ANESTHESIA EVENT (OUTPATIENT)
Dept: PERIOP | Facility: HOSPITAL | Age: 61
End: 2021-08-10

## 2021-08-10 ENCOUNTER — ANESTHESIA (OUTPATIENT)
Dept: PERIOP | Facility: HOSPITAL | Age: 61
End: 2021-08-10

## 2021-08-10 DIAGNOSIS — I25.110 CORONARY ARTERY DISEASE INVOLVING NATIVE CORONARY ARTERY OF NATIVE HEART WITH UNSTABLE ANGINA PECTORIS (HCC): ICD-10-CM

## 2021-08-10 DIAGNOSIS — I21.9 MYOCARDIAL INFARCTION (HCC): ICD-10-CM

## 2021-08-10 DIAGNOSIS — Z74.09 IMPAIRED MOBILITY: ICD-10-CM

## 2021-08-10 DIAGNOSIS — I21.3 ST ELEVATION MYOCARDIAL INFARCTION (STEMI), UNSPECIFIED ARTERY (HCC): Primary | ICD-10-CM

## 2021-08-10 PROBLEM — Z78.9 ALCOHOL USE: Status: ACTIVE | Noted: 2021-08-10

## 2021-08-10 PROBLEM — E78.5 HYPERLIPIDEMIA: Status: ACTIVE | Noted: 2021-08-10

## 2021-08-10 PROBLEM — I10 HYPERTENSION: Status: ACTIVE | Noted: 2021-08-10

## 2021-08-10 LAB
A-A DO2: 110.8 MMHG
A-A DO2: 135.6 MMHG
A-A DO2: 208.3 MMHG
A-A DO2: 220.6 MMHG
A-A DO2: 80.4 MMHG
A-A DO2: ABNORMAL
ABO GROUP BLD: NORMAL
ALBUMIN SERPL-MCNC: 3.6 G/DL (ref 3.5–5.2)
ALBUMIN SERPL-MCNC: 4.4 G/DL (ref 3.5–5.2)
ALBUMIN/GLOB SERPL: 1.5 G/DL
ALP SERPL-CCNC: 99 U/L (ref 39–117)
ALT SERPL W P-5'-P-CCNC: 23 U/L (ref 1–41)
ANION GAP SERPL CALCULATED.3IONS-SCNC: 8 MMOL/L (ref 5–15)
ANION GAP SERPL CALCULATED.3IONS-SCNC: 9 MMOL/L (ref 5–15)
APTT PPP: 42.2 SECONDS (ref 24.1–35)
APTT PPP: 49.5 SECONDS (ref 24.1–35)
ARTERIAL PATENCY WRIST A: ABNORMAL
AST SERPL-CCNC: 68 U/L (ref 1–40)
ATMOSPHERIC PRESS: 750 MMHG
ATMOSPHERIC PRESS: 751 MMHG
BASE EXCESS BLDA CALC-SCNC: -1 MMOL/L (ref 0–2)
BASE EXCESS BLDA CALC-SCNC: -2.8 MMOL/L (ref 0–2)
BASE EXCESS BLDA CALC-SCNC: 0.4 MMOL/L (ref 0–2)
BASE EXCESS BLDA CALC-SCNC: 1.3 MMOL/L (ref 0–2)
BASE EXCESS BLDA CALC-SCNC: 1.5 MMOL/L (ref 0–2)
BASE EXCESS BLDA CALC-SCNC: 1.8 MMOL/L (ref 0–2)
BASE EXCESS BLDA CALC-SCNC: 2.6 MMOL/L (ref 0–2)
BASE EXCESS BLDA CALC-SCNC: 2.7 MMOL/L (ref 0–2)
BASE EXCESS BLDV CALC-SCNC: 3.8 MMOL/L (ref 0–2)
BASOPHILS # BLD AUTO: 0.01 10*3/MM3 (ref 0–0.2)
BASOPHILS # BLD AUTO: 0.03 10*3/MM3 (ref 0–0.2)
BASOPHILS NFR BLD AUTO: 0.1 % (ref 0–1.5)
BASOPHILS NFR BLD AUTO: 0.4 % (ref 0–1.5)
BDY SITE: ABNORMAL
BILIRUB SERPL-MCNC: 0.4 MG/DL (ref 0–1.2)
BLD GP AB SCN SERPL QL: NEGATIVE
BODY TEMPERATURE: 37 C
BUN SERPL-MCNC: 12 MG/DL (ref 6–20)
BUN SERPL-MCNC: 15 MG/DL (ref 6–20)
BUN/CREAT SERPL: 16.2 (ref 7–25)
BUN/CREAT SERPL: 19.5 (ref 7–25)
CA-I BLD-MCNC: 4.15 MG/DL (ref 4.6–5.4)
CA-I BLD-MCNC: 4.18 MG/DL (ref 4.6–5.4)
CA-I BLD-MCNC: 4.6 MG/DL (ref 4.6–5.4)
CA-I BLD-MCNC: 4.76 MG/DL (ref 4.6–5.4)
CA-I BLD-MCNC: 4.98 MG/DL (ref 4.6–5.4)
CA-I BLD-MCNC: 5.02 MG/DL (ref 4.6–5.4)
CALCIUM SPEC-SCNC: 8.2 MG/DL (ref 8.6–10.5)
CALCIUM SPEC-SCNC: 9.5 MG/DL (ref 8.6–10.5)
CHLORIDE SERPL-SCNC: 103 MMOL/L (ref 98–107)
CHLORIDE SERPL-SCNC: 110 MMOL/L (ref 98–107)
CO2 SERPL-SCNC: 25 MMOL/L (ref 22–29)
CO2 SERPL-SCNC: 26 MMOL/L (ref 22–29)
COHGB MFR BLD: 0.9 % (ref 0–5)
COHGB MFR BLD: 1.3 % (ref 0–5)
COHGB MFR BLD: 1.4 % (ref 0–5)
COHGB MFR BLD: 1.5 % (ref 0–5)
COHGB MFR BLD: 1.9 % (ref 0–5)
CREAT SERPL-MCNC: 0.74 MG/DL (ref 0.76–1.27)
CREAT SERPL-MCNC: 0.77 MG/DL (ref 0.76–1.27)
D DIMER PPP FEU-MCNC: 8.48 MG/L (FEU) (ref 0–0.5)
DEPRECATED RDW RBC AUTO: 40.1 FL (ref 37–54)
DEPRECATED RDW RBC AUTO: 40.3 FL (ref 37–54)
DEPRECATED RDW RBC AUTO: 40.6 FL (ref 37–54)
DEPRECATED RDW RBC AUTO: 40.9 FL (ref 37–54)
DEPRECATED RDW RBC AUTO: 41 FL (ref 37–54)
EOSINOPHIL # BLD AUTO: 0.07 10*3/MM3 (ref 0–0.4)
EOSINOPHIL # BLD AUTO: 0.23 10*3/MM3 (ref 0–0.4)
EOSINOPHIL NFR BLD AUTO: 1 % (ref 0.3–6.2)
EOSINOPHIL NFR BLD AUTO: 3.4 % (ref 0.3–6.2)
ERYTHROCYTE [DISTWIDTH] IN BLOOD BY AUTOMATED COUNT: 11.7 % (ref 12.3–15.4)
ERYTHROCYTE [DISTWIDTH] IN BLOOD BY AUTOMATED COUNT: 11.7 % (ref 12.3–15.4)
ERYTHROCYTE [DISTWIDTH] IN BLOOD BY AUTOMATED COUNT: 11.8 % (ref 12.3–15.4)
ERYTHROCYTE [DISTWIDTH] IN BLOOD BY AUTOMATED COUNT: 11.9 % (ref 12.3–15.4)
ERYTHROCYTE [DISTWIDTH] IN BLOOD BY AUTOMATED COUNT: 11.9 % (ref 12.3–15.4)
FIBRINOGEN PPP-MCNC: 167 MG/DL (ref 240–460)
GAS FLOW AIRWAY: 2 LPM
GAS FLOW AIRWAY: 2 LPM
GFR SERPL CREATININE-BSD FRML MDRD: 103 ML/MIN/1.73
GFR SERPL CREATININE-BSD FRML MDRD: 108 ML/MIN/1.73
GLOBULIN UR ELPH-MCNC: 2.9 GM/DL
GLUCOSE SERPL-MCNC: 114 MG/DL (ref 65–99)
GLUCOSE SERPL-MCNC: 119 MG/DL (ref 65–99)
HBA1C MFR BLD: 4.8 % (ref 4.8–5.6)
HCO3 BLDA-SCNC: 23.4 MMOL/L (ref 20–26)
HCO3 BLDA-SCNC: 25 MMOL/L (ref 20–26)
HCO3 BLDA-SCNC: 26.5 MMOL/L (ref 20–26)
HCO3 BLDA-SCNC: 26.7 MMOL/L (ref 20–26)
HCO3 BLDA-SCNC: 26.7 MMOL/L (ref 20–26)
HCO3 BLDA-SCNC: 27 MMOL/L (ref 20–26)
HCO3 BLDA-SCNC: 27.5 MMOL/L (ref 20–26)
HCO3 BLDA-SCNC: 27.6 MMOL/L (ref 20–26)
HCO3 BLDV-SCNC: 29.4 MMOL/L (ref 22–28)
HCT VFR BLD AUTO: 22.4 % (ref 37.5–51)
HCT VFR BLD AUTO: 22.6 % (ref 37.5–51)
HCT VFR BLD AUTO: 26.1 % (ref 37.5–51)
HCT VFR BLD AUTO: 44.1 % (ref 37.5–51)
HCT VFR BLD AUTO: 44.6 % (ref 37.5–51)
HCT VFR BLD CALC: 27.1 % (ref 38–51)
HCT VFR BLD CALC: 28.5 % (ref 38–51)
HCT VFR BLD CALC: 28.8 % (ref 38–51)
HCT VFR BLD CALC: 29.5 % (ref 38–51)
HCT VFR BLD CALC: 43.4 % (ref 38–51)
HCT VFR BLD CALC: 43.6 % (ref 38–51)
HGB BLD-MCNC: 15 G/DL (ref 13–17.7)
HGB BLD-MCNC: 15.2 G/DL (ref 13–17.7)
HGB BLD-MCNC: 8 G/DL (ref 13–17.7)
HGB BLD-MCNC: 8.1 G/DL (ref 13–17.7)
HGB BLD-MCNC: 9.2 G/DL (ref 13–17.7)
HGB BLDA-MCNC: 10.1 G/DL (ref 14–18)
HGB BLDA-MCNC: 14.1 G/DL (ref 14–18)
HGB BLDA-MCNC: 14.2 G/DL (ref 14–18)
HGB BLDA-MCNC: 8.9 G/DL (ref 14–18)
HGB BLDA-MCNC: 9.3 G/DL (ref 14–18)
HGB BLDA-MCNC: 9.4 G/DL (ref 14–18)
HGB BLDA-MCNC: 9.6 G/DL (ref 14–18)
HOLD SPECIMEN: NORMAL
HOLD SPECIMEN: NORMAL
IMM GRANULOCYTES # BLD AUTO: 0.02 10*3/MM3 (ref 0–0.05)
IMM GRANULOCYTES # BLD AUTO: 0.03 10*3/MM3 (ref 0–0.05)
IMM GRANULOCYTES NFR BLD AUTO: 0.3 % (ref 0–0.5)
IMM GRANULOCYTES NFR BLD AUTO: 0.4 % (ref 0–0.5)
INHALED O2 CONCENTRATION: 100 %
INHALED O2 CONCENTRATION: 30 %
INHALED O2 CONCENTRATION: 30 %
INHALED O2 CONCENTRATION: 60 %
INHALED O2 CONCENTRATION: 80 %
INR PPP: 1.09 (ref 0.91–1.09)
INR PPP: 1.78 (ref 0.91–1.09)
INR PPP: 2.07 (ref 0.91–1.09)
LIPASE SERPL-CCNC: 21 U/L (ref 13–60)
LYMPHOCYTES # BLD AUTO: 1.06 10*3/MM3 (ref 0.7–3.1)
LYMPHOCYTES # BLD AUTO: 1.08 10*3/MM3 (ref 0.7–3.1)
LYMPHOCYTES NFR BLD AUTO: 15 % (ref 19.6–45.3)
LYMPHOCYTES NFR BLD AUTO: 15.5 % (ref 19.6–45.3)
Lab: ABNORMAL
Lab: NORMAL
MCH RBC QN AUTO: 32.1 PG (ref 26.6–33)
MCH RBC QN AUTO: 32.1 PG (ref 26.6–33)
MCH RBC QN AUTO: 32.9 PG (ref 26.6–33)
MCH RBC QN AUTO: 33.1 PG (ref 26.6–33)
MCH RBC QN AUTO: 33.2 PG (ref 26.6–33)
MCHC RBC AUTO-ENTMCNC: 34 G/DL (ref 31.5–35.7)
MCHC RBC AUTO-ENTMCNC: 34.1 G/DL (ref 31.5–35.7)
MCHC RBC AUTO-ENTMCNC: 35.2 G/DL (ref 31.5–35.7)
MCHC RBC AUTO-ENTMCNC: 35.7 G/DL (ref 31.5–35.7)
MCHC RBC AUTO-ENTMCNC: 35.8 G/DL (ref 31.5–35.7)
MCV RBC AUTO: 92.6 FL (ref 79–97)
MCV RBC AUTO: 92.6 FL (ref 79–97)
MCV RBC AUTO: 93.2 FL (ref 79–97)
MCV RBC AUTO: 94.1 FL (ref 79–97)
MCV RBC AUTO: 94.2 FL (ref 79–97)
METHGB BLD QL: 0.4 % (ref 0–3)
METHGB BLD QL: 0.5 % (ref 0–3)
METHGB BLD QL: 0.6 % (ref 0–3)
METHGB BLD QL: 0.7 % (ref 0–3)
METHGB BLD QL: 0.8 % (ref 0–3)
METHGB BLD QL: 0.8 % (ref 0–3)
METHGB BLD QL: 1.1 % (ref 0–3)
MODALITY: ABNORMAL
MONOCYTES # BLD AUTO: 0.42 10*3/MM3 (ref 0.1–0.9)
MONOCYTES # BLD AUTO: 0.53 10*3/MM3 (ref 0.1–0.9)
MONOCYTES NFR BLD AUTO: 5.8 % (ref 5–12)
MONOCYTES NFR BLD AUTO: 7.7 % (ref 5–12)
NEUTROPHILS NFR BLD AUTO: 4.99 10*3/MM3 (ref 1.7–7)
NEUTROPHILS NFR BLD AUTO: 5.6 10*3/MM3 (ref 1.7–7)
NEUTROPHILS NFR BLD AUTO: 72.7 % (ref 42.7–76)
NEUTROPHILS NFR BLD AUTO: 77.7 % (ref 42.7–76)
NOTE: ABNORMAL
NRBC BLD AUTO-RTO: 0 /100 WBC (ref 0–0.2)
NRBC BLD AUTO-RTO: 0 /100 WBC (ref 0–0.2)
NT-PROBNP SERPL-MCNC: 379.6 PG/ML (ref 0–900)
OXYHGB MFR BLDV: 70 % (ref 60–85)
OXYHGB MFR BLDV: 98 % (ref 94–99)
OXYHGB MFR BLDV: 98 % (ref 94–99)
OXYHGB MFR BLDV: 98.5 % (ref 94–99)
OXYHGB MFR BLDV: >98.5 % (ref 94–99)
PCO2 BLDA: 37.4 MM HG (ref 35–45)
PCO2 BLDA: 40.1 MM HG (ref 35–45)
PCO2 BLDA: 42.3 MM HG (ref 35–45)
PCO2 BLDA: 44 MM HG (ref 35–45)
PCO2 BLDA: 45.1 MM HG (ref 35–45)
PCO2 BLDA: 48.5 MM HG (ref 35–45)
PCO2 BLDA: 48.7 MM HG (ref 35–45)
PCO2 BLDA: 55 MM HG (ref 35–45)
PCO2 BLDV: 48.2 MM HG (ref 41–51)
PCO2 TEMP ADJ BLD: 37.4 MM HG (ref 35–45)
PCO2 TEMP ADJ BLD: 40.1 MM HG (ref 35–45)
PCO2 TEMP ADJ BLD: 42.3 MM HG (ref 35–45)
PCO2 TEMP ADJ BLD: 44 MM HG (ref 35–45)
PCO2 TEMP ADJ BLD: 45.1 MM HG (ref 35–45)
PCO2 TEMP ADJ BLD: 48.5 MM HG (ref 35–45)
PCO2 TEMP ADJ BLD: 48.7 MM HG (ref 35–45)
PCO2 TEMP ADJ BLD: 55 MM HG (ref 35–45)
PEEP RESPIRATORY: 5 CM[H2O]
PH BLDA: 7.27 PH UNITS (ref 7.35–7.45)
PH BLDA: 7.34 PH UNITS (ref 7.35–7.45)
PH BLDA: 7.36 PH UNITS (ref 7.35–7.45)
PH BLDA: 7.38 PH UNITS (ref 7.35–7.45)
PH BLDA: 7.39 PH UNITS (ref 7.35–7.45)
PH BLDA: 7.41 PH UNITS (ref 7.35–7.45)
PH BLDA: 7.42 PH UNITS (ref 7.35–7.45)
PH BLDA: 7.44 PH UNITS (ref 7.35–7.45)
PH BLDV: 7.39 PH UNITS (ref 7.32–7.42)
PH, TEMP CORRECTED: 7.27 PH UNITS (ref 7.35–7.45)
PH, TEMP CORRECTED: 7.34 PH UNITS (ref 7.35–7.45)
PH, TEMP CORRECTED: 7.36 PH UNITS (ref 7.35–7.45)
PH, TEMP CORRECTED: 7.38 PH UNITS (ref 7.35–7.45)
PH, TEMP CORRECTED: 7.39 PH UNITS (ref 7.35–7.45)
PH, TEMP CORRECTED: 7.41 PH UNITS (ref 7.35–7.45)
PH, TEMP CORRECTED: 7.42 PH UNITS (ref 7.35–7.45)
PH, TEMP CORRECTED: 7.44 PH UNITS (ref 7.35–7.45)
PHOSPHATE SERPL-MCNC: 2.5 MG/DL (ref 2.5–4.5)
PLATELET # BLD AUTO: 106 10*3/MM3 (ref 140–450)
PLATELET # BLD AUTO: 113 10*3/MM3 (ref 140–450)
PLATELET # BLD AUTO: 114 10*3/MM3 (ref 140–450)
PLATELET # BLD AUTO: 205 10*3/MM3 (ref 140–450)
PLATELET # BLD AUTO: 241 10*3/MM3 (ref 140–450)
PMV BLD AUTO: 9.1 FL (ref 6–12)
PMV BLD AUTO: 9.3 FL (ref 6–12)
PMV BLD AUTO: 9.4 FL (ref 6–12)
PMV BLD AUTO: 9.5 FL (ref 6–12)
PMV BLD AUTO: 9.5 FL (ref 6–12)
PO2 BLDA: 153 MM HG (ref 83–108)
PO2 BLDA: 386 MM HG (ref 83–108)
PO2 BLDA: 389 MM HG (ref 83–108)
PO2 BLDA: 453 MM HG (ref 83–108)
PO2 BLDA: 538 MM HG (ref 83–108)
PO2 BLDA: 70.7 MM HG (ref 83–108)
PO2 BLDA: 76.2 MM HG (ref 83–108)
PO2 BLDA: >547 MM HG (ref 83–108)
PO2 BLDV: 37.6 MM HG (ref 27–53)
PO2 TEMP ADJ BLD: 153 MM HG (ref 83–108)
PO2 TEMP ADJ BLD: 386 MM HG (ref 83–108)
PO2 TEMP ADJ BLD: 389 MM HG (ref 83–108)
PO2 TEMP ADJ BLD: 453 MM HG (ref 83–108)
PO2 TEMP ADJ BLD: 538 MM HG (ref 83–108)
PO2 TEMP ADJ BLD: 70.7 MM HG (ref 83–108)
PO2 TEMP ADJ BLD: 76.2 MM HG (ref 83–108)
PO2 TEMP ADJ BLD: >547 MM HG (ref 83–108)
POTASSIUM BLDA-SCNC: 3.8 MMOL/L (ref 3.5–5.2)
POTASSIUM BLDA-SCNC: 4.1 MMOL/L (ref 3.5–5.2)
POTASSIUM BLDA-SCNC: 4.2 MMOL/L (ref 3.5–5.2)
POTASSIUM BLDA-SCNC: 4.4 MMOL/L (ref 3.5–5.2)
POTASSIUM BLDA-SCNC: 4.4 MMOL/L (ref 3.5–5.2)
POTASSIUM BLDA-SCNC: 5.3 MMOL/L (ref 3.5–5.2)
POTASSIUM BLDV-SCNC: 4.2 MMOL/L (ref 3.5–5.2)
POTASSIUM SERPL-SCNC: 4 MMOL/L (ref 3.5–5.2)
POTASSIUM SERPL-SCNC: 4.1 MMOL/L (ref 3.5–5.2)
PROT SERPL-MCNC: 7.3 G/DL (ref 6–8.5)
PROTHROMBIN TIME: 13.3 SECONDS (ref 11.5–13.4)
PROTHROMBIN TIME: 19.4 SECONDS (ref 11.5–13.4)
PROTHROMBIN TIME: 21.8 SECONDS (ref 11.5–13.4)
PSV: 10 CMH2O
PSV: 10 CMH2O
RBC # BLD AUTO: 2.42 10*6/MM3 (ref 4.14–5.8)
RBC # BLD AUTO: 2.44 10*6/MM3 (ref 4.14–5.8)
RBC # BLD AUTO: 2.8 10*6/MM3 (ref 4.14–5.8)
RBC # BLD AUTO: 4.68 10*6/MM3 (ref 4.14–5.8)
RBC # BLD AUTO: 4.74 10*6/MM3 (ref 4.14–5.8)
RH BLD: POSITIVE
SAO2 % BLDCOA: 94.3 % (ref 94–99)
SAO2 % BLDCOA: 95.8 % (ref 94–99)
SAO2 % BLDCOA: 99.7 % (ref 94–99)
SAO2 % BLDCOA: >100.1 % (ref 94–99)
SAO2 % BLDCOV: 71.8 % (ref 45–75)
SARS-COV-2 RNA PNL SPEC NAA+PROBE: NOT DETECTED
SET MECH RESP RATE: 18
SET MECH RESP RATE: 18
SET MECH RESP RATE: 20
SODIUM BLDA-SCNC: 138 MMOL/L (ref 136–145)
SODIUM BLDA-SCNC: 138 MMOL/L (ref 136–145)
SODIUM BLDA-SCNC: 139 MMOL/L (ref 136–145)
SODIUM BLDA-SCNC: 140 MMOL/L (ref 136–145)
SODIUM BLDA-SCNC: 142 MMOL/L (ref 136–145)
SODIUM BLDA-SCNC: 144 MMOL/L (ref 136–145)
SODIUM BLDV-SCNC: 140 MMOL/L (ref 136–145)
SODIUM SERPL-SCNC: 137 MMOL/L (ref 136–145)
SODIUM SERPL-SCNC: 144 MMOL/L (ref 136–145)
T&S EXPIRATION DATE: NORMAL
TROPONIN T SERPL-MCNC: 0.33 NG/ML (ref 0–0.03)
TROPONIN T SERPL-MCNC: 0.39 NG/ML (ref 0–0.03)
VENTILATOR MODE: ABNORMAL
VT ON VENT VENT: 500 ML
VT ON VENT VENT: 500 ML
VT ON VENT VENT: 550 ML
WBC # BLD AUTO: 6.86 10*3/MM3 (ref 3.4–10.8)
WBC # BLD AUTO: 7.02 10*3/MM3 (ref 3.4–10.8)
WBC # BLD AUTO: 7.2 10*3/MM3 (ref 3.4–10.8)
WBC # BLD AUTO: 7.21 10*3/MM3 (ref 3.4–10.8)
WBC # BLD AUTO: 8.72 10*3/MM3 (ref 3.4–10.8)
WHOLE BLOOD HOLD SPECIMEN: NORMAL

## 2021-08-10 PROCEDURE — 93325 DOPPLER ECHO COLOR FLOW MAPG: CPT | Performed by: INTERNAL MEDICINE

## 2021-08-10 PROCEDURE — 25010000002 VANCOMYCIN 1 G RECONSTITUTED SOLUTION 1 EACH VIAL: Performed by: THORACIC SURGERY (CARDIOTHORACIC VASCULAR SURGERY)

## 2021-08-10 PROCEDURE — 86920 COMPATIBILITY TEST SPIN: CPT

## 2021-08-10 PROCEDURE — P9041 ALBUMIN (HUMAN),5%, 50ML: HCPCS | Performed by: NURSE ANESTHETIST, CERTIFIED REGISTERED

## 2021-08-10 PROCEDURE — 80069 RENAL FUNCTION PANEL: CPT | Performed by: THORACIC SURGERY (CARDIOTHORACIC VASCULAR SURGERY)

## 2021-08-10 PROCEDURE — 86901 BLOOD TYPING SEROLOGIC RH(D): CPT | Performed by: NURSE PRACTITIONER

## 2021-08-10 PROCEDURE — 93306 TTE W/DOPPLER COMPLETE: CPT | Performed by: INTERNAL MEDICINE

## 2021-08-10 PROCEDURE — 25010000002 BIVALIRUDIN TRIFLUOROACETATE 250 MG RECONSTITUTED SOLUTION: Performed by: INTERNAL MEDICINE

## 2021-08-10 PROCEDURE — 25010000002 HEPARIN (PORCINE) 1000-0.9 UT/500ML-% SOLUTION: Performed by: INTERNAL MEDICINE

## 2021-08-10 PROCEDURE — 93010 ELECTROCARDIOGRAM REPORT: CPT | Performed by: INTERNAL MEDICINE

## 2021-08-10 PROCEDURE — 85379 FIBRIN DEGRADATION QUANT: CPT | Performed by: NURSE PRACTITIONER

## 2021-08-10 PROCEDURE — 25010000002 HEPARIN (PORCINE) PER 1000 UNITS: Performed by: NURSE ANESTHETIST, CERTIFIED REGISTERED

## 2021-08-10 PROCEDURE — 93356 MYOCRD STRAIN IMG SPCKL TRCK: CPT | Performed by: INTERNAL MEDICINE

## 2021-08-10 PROCEDURE — 25810000003 DEXTROSE 5 % WITH KCL 20 MEQ 20-5 MEQ/L-% SOLUTION: Performed by: THORACIC SURGERY (CARDIOTHORACIC VASCULAR SURGERY)

## 2021-08-10 PROCEDURE — 82805 BLOOD GASES W/O2 SATURATION: CPT

## 2021-08-10 PROCEDURE — 99153 MOD SED SAME PHYS/QHP EA: CPT | Performed by: INTERNAL MEDICINE

## 2021-08-10 PROCEDURE — 25010000002 FUROSEMIDE PER 20 MG

## 2021-08-10 PROCEDURE — 25010000002 MORPHINE SULFATE (PF) 2 MG/ML SOLUTION: Performed by: THORACIC SURGERY (CARDIOTHORACIC VASCULAR SURGERY)

## 2021-08-10 PROCEDURE — 25010000002 EPTIFIBATIDE PER 5 MG: Performed by: INTERNAL MEDICINE

## 2021-08-10 PROCEDURE — 83690 ASSAY OF LIPASE: CPT | Performed by: NURSE PRACTITIONER

## 2021-08-10 PROCEDURE — 93005 ELECTROCARDIOGRAM TRACING: CPT | Performed by: NURSE PRACTITIONER

## 2021-08-10 PROCEDURE — 85027 COMPLETE CBC AUTOMATED: CPT | Performed by: THORACIC SURGERY (CARDIOTHORACIC VASCULAR SURGERY)

## 2021-08-10 PROCEDURE — 25010000002 ALBUMIN HUMAN 25% PER 50 ML

## 2021-08-10 PROCEDURE — 99152 MOD SED SAME PHYS/QHP 5/>YRS: CPT | Performed by: INTERNAL MEDICINE

## 2021-08-10 PROCEDURE — 85610 PROTHROMBIN TIME: CPT | Performed by: EMERGENCY MEDICINE

## 2021-08-10 PROCEDURE — 94799 UNLISTED PULMONARY SVC/PX: CPT

## 2021-08-10 PROCEDURE — 25010000003 METHYLENE BLUE 50 MG/10ML SOLUTION: Performed by: THORACIC SURGERY (CARDIOTHORACIC VASCULAR SURGERY)

## 2021-08-10 PROCEDURE — 71045 X-RAY EXAM CHEST 1 VIEW: CPT

## 2021-08-10 PROCEDURE — 25010000002 HEPARIN (PORCINE) 2000-0.9 UNIT/L-% SOLUTION: Performed by: INTERNAL MEDICINE

## 2021-08-10 PROCEDURE — 82375 ASSAY CARBOXYHB QUANT: CPT

## 2021-08-10 PROCEDURE — 85027 COMPLETE CBC AUTOMATED: CPT | Performed by: INTERNAL MEDICINE

## 2021-08-10 PROCEDURE — C1894 INTRO/SHEATH, NON-LASER: HCPCS | Performed by: INTERNAL MEDICINE

## 2021-08-10 PROCEDURE — C1713 ANCHOR/SCREW BN/BN,TIS/BN: HCPCS | Performed by: THORACIC SURGERY (CARDIOTHORACIC VASCULAR SURGERY)

## 2021-08-10 PROCEDURE — 93459 L HRT ART/GRFT ANGIO: CPT | Performed by: INTERNAL MEDICINE

## 2021-08-10 PROCEDURE — 25010000002 DIPHENHYDRAMINE PER 50 MG: Performed by: INTERNAL MEDICINE

## 2021-08-10 PROCEDURE — 5A1221Z PERFORMANCE OF CARDIAC OUTPUT, CONTINUOUS: ICD-10-PCS | Performed by: THORACIC SURGERY (CARDIOTHORACIC VASCULAR SURGERY)

## 2021-08-10 PROCEDURE — 93318 ECHO TRANSESOPHAGEAL INTRAOP: CPT | Performed by: NURSE ANESTHETIST, CERTIFIED REGISTERED

## 2021-08-10 PROCEDURE — A4648 IMPLANTABLE TISSUE MARKER: HCPCS | Performed by: THORACIC SURGERY (CARDIOTHORACIC VASCULAR SURGERY)

## 2021-08-10 PROCEDURE — 83050 HGB METHEMOGLOBIN QUAN: CPT

## 2021-08-10 PROCEDURE — B2111ZZ FLUOROSCOPY OF MULTIPLE CORONARY ARTERIES USING LOW OSMOLAR CONTRAST: ICD-10-PCS | Performed by: INTERNAL MEDICINE

## 2021-08-10 PROCEDURE — C1760 CLOSURE DEV, VASC: HCPCS | Performed by: INTERNAL MEDICINE

## 2021-08-10 PROCEDURE — 85025 COMPLETE CBC W/AUTO DIFF WBC: CPT | Performed by: THORACIC SURGERY (CARDIOTHORACIC VASCULAR SURGERY)

## 2021-08-10 PROCEDURE — 25010000002 MANNITOL PER 50 ML

## 2021-08-10 PROCEDURE — 87635 SARS-COV-2 COVID-19 AMP PRB: CPT | Performed by: NURSE PRACTITIONER

## 2021-08-10 PROCEDURE — 25010000002 POTASSIUM CHLORIDE PER 2 MEQ OF POTASSIUM

## 2021-08-10 PROCEDURE — 25010000002 FENTANYL CITRATE (PF) 100 MCG/2ML SOLUTION: Performed by: INTERNAL MEDICINE

## 2021-08-10 PROCEDURE — 83036 HEMOGLOBIN GLYCOSYLATED A1C: CPT | Performed by: NURSE PRACTITIONER

## 2021-08-10 PROCEDURE — 86850 RBC ANTIBODY SCREEN: CPT | Performed by: NURSE PRACTITIONER

## 2021-08-10 PROCEDURE — 02100Z9 BYPASS CORONARY ARTERY, ONE ARTERY FROM LEFT INTERNAL MAMMARY, OPEN APPROACH: ICD-10-PCS | Performed by: THORACIC SURGERY (CARDIOTHORACIC VASCULAR SURGERY)

## 2021-08-10 PROCEDURE — 25010000002 PHENYLEPHRINE 10 MG/ML SOLUTION

## 2021-08-10 PROCEDURE — 84484 ASSAY OF TROPONIN QUANT: CPT | Performed by: EMERGENCY MEDICINE

## 2021-08-10 PROCEDURE — C1769 GUIDE WIRE: HCPCS | Performed by: INTERNAL MEDICINE

## 2021-08-10 PROCEDURE — 25010000003 POTASSIUM CHLORIDE PER 2 MEQ: Performed by: THORACIC SURGERY (CARDIOTHORACIC VASCULAR SURGERY)

## 2021-08-10 PROCEDURE — 80053 COMPREHEN METABOLIC PANEL: CPT | Performed by: EMERGENCY MEDICINE

## 2021-08-10 PROCEDURE — 25010000002 PROTAMINE SULFATE PER 10 MG: Performed by: NURSE ANESTHETIST, CERTIFIED REGISTERED

## 2021-08-10 PROCEDURE — 06BQ4ZZ EXCISION OF LEFT SAPHENOUS VEIN, PERCUTANEOUS ENDOSCOPIC APPROACH: ICD-10-PCS | Performed by: THORACIC SURGERY (CARDIOTHORACIC VASCULAR SURGERY)

## 2021-08-10 PROCEDURE — 25010000002 MIDAZOLAM HCL (PF) 5 MG/5ML SOLUTION: Performed by: INTERNAL MEDICINE

## 2021-08-10 PROCEDURE — 25010000002 PAPAVERINE PER 60 MG: Performed by: THORACIC SURGERY (CARDIOTHORACIC VASCULAR SURGERY)

## 2021-08-10 PROCEDURE — 0 IOPAMIDOL PER 1 ML: Performed by: INTERNAL MEDICINE

## 2021-08-10 PROCEDURE — 82820 HEMOGLOBIN-OXYGEN AFFINITY: CPT

## 2021-08-10 PROCEDURE — 93458 L HRT ARTERY/VENTRICLE ANGIO: CPT | Performed by: INTERNAL MEDICINE

## 2021-08-10 PROCEDURE — 86927 PLASMA FRESH FROZEN: CPT

## 2021-08-10 PROCEDURE — C1887 CATHETER, GUIDING: HCPCS | Performed by: INTERNAL MEDICINE

## 2021-08-10 PROCEDURE — 85384 FIBRINOGEN ACTIVITY: CPT | Performed by: THORACIC SURGERY (CARDIOTHORACIC VASCULAR SURGERY)

## 2021-08-10 PROCEDURE — 93312 ECHO TRANSESOPHAGEAL: CPT | Performed by: INTERNAL MEDICINE

## 2021-08-10 PROCEDURE — 93005 ELECTROCARDIOGRAM TRACING: CPT | Performed by: EMERGENCY MEDICINE

## 2021-08-10 PROCEDURE — 25010000002 PHENYLEPHRINE 10 MG/ML SOLUTION: Performed by: NURSE ANESTHETIST, CERTIFIED REGISTERED

## 2021-08-10 PROCEDURE — 25010000002 PHENYLEPHRINE 10 MG/ML SOLUTION 5 ML VIAL: Performed by: THORACIC SURGERY (CARDIOTHORACIC VASCULAR SURGERY)

## 2021-08-10 PROCEDURE — P9047 ALBUMIN (HUMAN), 25%, 50ML: HCPCS

## 2021-08-10 PROCEDURE — 0 CEFAZOLIN PER 500 MG: Performed by: NURSE ANESTHETIST, CERTIFIED REGISTERED

## 2021-08-10 PROCEDURE — 85730 THROMBOPLASTIN TIME PARTIAL: CPT | Performed by: THORACIC SURGERY (CARDIOTHORACIC VASCULAR SURGERY)

## 2021-08-10 PROCEDURE — 25010000002 AMIODARONE IN DEXTROSE 5% 360-4.14 MG/200ML-% SOLUTION: Performed by: THORACIC SURGERY (CARDIOTHORACIC VASCULAR SURGERY)

## 2021-08-10 PROCEDURE — 99255 IP/OBS CONSLTJ NEW/EST HI 80: CPT | Performed by: THORACIC SURGERY (CARDIOTHORACIC VASCULAR SURGERY)

## 2021-08-10 PROCEDURE — 25010000002 HEPARIN (PORCINE) PER 1000 UNITS: Performed by: THORACIC SURGERY (CARDIOTHORACIC VASCULAR SURGERY)

## 2021-08-10 PROCEDURE — 85025 COMPLETE CBC W/AUTO DIFF WBC: CPT | Performed by: EMERGENCY MEDICINE

## 2021-08-10 PROCEDURE — 99223 1ST HOSP IP/OBS HIGH 75: CPT | Performed by: INTERNAL MEDICINE

## 2021-08-10 PROCEDURE — 85610 PROTHROMBIN TIME: CPT | Performed by: THORACIC SURGERY (CARDIOTHORACIC VASCULAR SURGERY)

## 2021-08-10 PROCEDURE — 99284 EMERGENCY DEPT VISIT MOD MDM: CPT

## 2021-08-10 PROCEDURE — 25010000002 PHENYLEPHRINE HCL 0.8 MG/10ML SOLUTION PREFILLED SYRINGE: Performed by: NURSE ANESTHETIST, CERTIFIED REGISTERED

## 2021-08-10 PROCEDURE — 25010000002 HEPARIN (PORCINE) PER 1000 UNITS

## 2021-08-10 PROCEDURE — 93005 ELECTROCARDIOGRAM TRACING: CPT | Performed by: THORACIC SURGERY (CARDIOTHORACIC VASCULAR SURGERY)

## 2021-08-10 PROCEDURE — 36430 TRANSFUSION BLD/BLD COMPNT: CPT

## 2021-08-10 PROCEDURE — P9035 PLATELET PHERES LEUKOREDUCED: HCPCS

## 2021-08-10 PROCEDURE — C1751 CATH, INF, PER/CENT/MIDLINE: HCPCS | Performed by: NURSE ANESTHETIST, CERTIFIED REGISTERED

## 2021-08-10 PROCEDURE — B2151ZZ FLUOROSCOPY OF LEFT HEART USING LOW OSMOLAR CONTRAST: ICD-10-PCS | Performed by: INTERNAL MEDICINE

## 2021-08-10 PROCEDURE — 92941 PRQ TRLML REVSC TOT OCCL AMI: CPT | Performed by: INTERNAL MEDICINE

## 2021-08-10 PROCEDURE — C1889 IMPLANT/INSERT DEVICE, NOC: HCPCS | Performed by: THORACIC SURGERY (CARDIOTHORACIC VASCULAR SURGERY)

## 2021-08-10 PROCEDURE — 93880 EXTRACRANIAL BILAT STUDY: CPT | Performed by: SURGERY

## 2021-08-10 PROCEDURE — 4A023N7 MEASUREMENT OF CARDIAC SAMPLING AND PRESSURE, LEFT HEART, PERCUTANEOUS APPROACH: ICD-10-PCS | Performed by: INTERNAL MEDICINE

## 2021-08-10 PROCEDURE — 94640 AIRWAY INHALATION TREATMENT: CPT

## 2021-08-10 PROCEDURE — 0 METHYLENE BLUE 50 MG/10ML SOLUTION: Performed by: THORACIC SURGERY (CARDIOTHORACIC VASCULAR SURGERY)

## 2021-08-10 PROCEDURE — 86901 BLOOD TYPING SEROLOGIC RH(D): CPT

## 2021-08-10 PROCEDURE — 93880 EXTRACRANIAL BILAT STUDY: CPT

## 2021-08-10 PROCEDURE — 25010000002 VANCOMYCIN 1 G RECONSTITUTED SOLUTION: Performed by: NURSE ANESTHETIST, CERTIFIED REGISTERED

## 2021-08-10 PROCEDURE — 33508 ENDOSCOPIC VEIN HARVEST: CPT | Performed by: THORACIC SURGERY (CARDIOTHORACIC VASCULAR SURGERY)

## 2021-08-10 PROCEDURE — C1725 CATH, TRANSLUMIN NON-LASER: HCPCS | Performed by: INTERNAL MEDICINE

## 2021-08-10 PROCEDURE — 86900 BLOOD TYPING SEROLOGIC ABO: CPT

## 2021-08-10 PROCEDURE — 82803 BLOOD GASES ANY COMBINATION: CPT

## 2021-08-10 PROCEDURE — 25010000002 ONDANSETRON PER 1 MG: Performed by: THORACIC SURGERY (CARDIOTHORACIC VASCULAR SURGERY)

## 2021-08-10 PROCEDURE — 25010000002 PERFLUTREN 6.52 MG/ML SUSPENSION: Performed by: INTERNAL MEDICINE

## 2021-08-10 PROCEDURE — 94002 VENT MGMT INPAT INIT DAY: CPT

## 2021-08-10 PROCEDURE — 021209W BYPASS CORONARY ARTERY, THREE ARTERIES FROM AORTA WITH AUTOLOGOUS VENOUS TISSUE, OPEN APPROACH: ICD-10-PCS | Performed by: THORACIC SURGERY (CARDIOTHORACIC VASCULAR SURGERY)

## 2021-08-10 PROCEDURE — 82330 ASSAY OF CALCIUM: CPT

## 2021-08-10 PROCEDURE — 93306 TTE W/DOPPLER COMPLETE: CPT

## 2021-08-10 PROCEDURE — 25010000002 PROPOFOL 10 MG/ML EMULSION: Performed by: NURSE ANESTHETIST, CERTIFIED REGISTERED

## 2021-08-10 PROCEDURE — 93356 MYOCRD STRAIN IMG SPCKL TRCK: CPT

## 2021-08-10 PROCEDURE — 33533 CABG ARTERIAL SINGLE: CPT | Performed by: THORACIC SURGERY (CARDIOTHORACIC VASCULAR SURGERY)

## 2021-08-10 PROCEDURE — 02703ZZ DILATION OF CORONARY ARTERY, ONE ARTERY, PERCUTANEOUS APPROACH: ICD-10-PCS | Performed by: INTERNAL MEDICINE

## 2021-08-10 PROCEDURE — 86900 BLOOD TYPING SEROLOGIC ABO: CPT | Performed by: NURSE PRACTITIONER

## 2021-08-10 PROCEDURE — 83880 ASSAY OF NATRIURETIC PEPTIDE: CPT | Performed by: NURSE PRACTITIONER

## 2021-08-10 PROCEDURE — 25010000002 PROTAMINE SULFATE PER 10 MG

## 2021-08-10 PROCEDURE — P9017 PLASMA 1 DONOR FRZ W/IN 8 HR: HCPCS

## 2021-08-10 PROCEDURE — 25010000002 MIDAZOLAM PER 1 MG: Performed by: NURSE ANESTHETIST, CERTIFIED REGISTERED

## 2021-08-10 PROCEDURE — 33519 CABG ARTERY-VEIN THREE: CPT | Performed by: THORACIC SURGERY (CARDIOTHORACIC VASCULAR SURGERY)

## 2021-08-10 PROCEDURE — 25010000003 CEFAZOLIN PER 500 MG: Performed by: NURSE ANESTHETIST, CERTIFIED REGISTERED

## 2021-08-10 PROCEDURE — 75716 ARTERY X-RAYS ARMS/LEGS: CPT | Performed by: INTERNAL MEDICINE

## 2021-08-10 PROCEDURE — 0 POTASSIUM CHLORIDE PER 2 MEQ: Performed by: THORACIC SURGERY (CARDIOTHORACIC VASCULAR SURGERY)

## 2021-08-10 PROCEDURE — 25010000002 ALBUMIN HUMAN 5% PER 50 ML: Performed by: NURSE ANESTHETIST, CERTIFIED REGISTERED

## 2021-08-10 DEVICE — HEMOST ABS SURGIFOAM SZ100 8X12 10MM: Type: IMPLANTABLE DEVICE | Site: CHEST | Status: FUNCTIONAL

## 2021-08-10 DEVICE — PLEDGET INCISIONLINE REINF TFE SFT PTFE 1.5X3X7MM WHT: Type: IMPLANTABLE DEVICE | Site: HEART | Status: FUNCTIONAL

## 2021-08-10 DEVICE — WR SUT NONABS MF SS CCS 1/2CIR CUT/CONV 5/0 18IN M657G: Type: IMPLANTABLE DEVICE | Site: STERNUM | Status: FUNCTIONAL

## 2021-08-10 DEVICE — CLIP LIGAT VASC HORIZON TI LG ORNG 6CT: Type: IMPLANTABLE DEVICE | Site: CHEST | Status: FUNCTIONAL

## 2021-08-10 DEVICE — DISK-SHAPED STYLE, SILICONE (1 PER STERILE PKG)
Type: IMPLANTABLE DEVICE | Site: AORTA | Status: FUNCTIONAL
Brand: SCANLAN® RADIOMARK® GRAFT MARKERS

## 2021-08-10 DEVICE — WR SUT NONABS MF SS V40 1/2CIR TC 6/0 18IN M649G: Type: IMPLANTABLE DEVICE | Site: STERNUM | Status: FUNCTIONAL

## 2021-08-10 RX ORDER — POTASSIUM CHLORIDE 29.8 MG/ML
20 INJECTION INTRAVENOUS
Status: DISCONTINUED | OUTPATIENT
Start: 2021-08-10 | End: 2021-08-12

## 2021-08-10 RX ORDER — ONDANSETRON 2 MG/ML
4 INJECTION INTRAMUSCULAR; INTRAVENOUS EVERY 6 HOURS PRN
Status: DISCONTINUED | OUTPATIENT
Start: 2021-08-10 | End: 2021-08-13 | Stop reason: HOSPADM

## 2021-08-10 RX ORDER — MIDAZOLAM HYDROCHLORIDE 1 MG/ML
INJECTION, SOLUTION INTRAMUSCULAR; INTRAVENOUS AS NEEDED
Status: DISCONTINUED | OUTPATIENT
Start: 2021-08-10 | End: 2021-08-10 | Stop reason: HOSPADM

## 2021-08-10 RX ORDER — LIDOCAINE HYDROCHLORIDE 20 MG/ML
INJECTION, SOLUTION EPIDURAL; INFILTRATION; INTRACAUDAL; PERINEURAL AS NEEDED
Status: DISCONTINUED | OUTPATIENT
Start: 2021-08-10 | End: 2021-08-10 | Stop reason: SURG

## 2021-08-10 RX ORDER — SODIUM CHLORIDE 9 MG/ML
INJECTION, SOLUTION INTRAVENOUS AS NEEDED
Status: DISCONTINUED | OUTPATIENT
Start: 2021-08-10 | End: 2021-08-10 | Stop reason: HOSPADM

## 2021-08-10 RX ORDER — PROPOFOL 10 MG/ML
VIAL (ML) INTRAVENOUS AS NEEDED
Status: DISCONTINUED | OUTPATIENT
Start: 2021-08-10 | End: 2021-08-10 | Stop reason: SURG

## 2021-08-10 RX ORDER — ASPIRIN 81 MG/1
324 TABLET, CHEWABLE ORAL ONCE
Status: COMPLETED | OUTPATIENT
Start: 2021-08-10 | End: 2021-08-10

## 2021-08-10 RX ORDER — IPRATROPIUM BROMIDE AND ALBUTEROL SULFATE 2.5; .5 MG/3ML; MG/3ML
3 SOLUTION RESPIRATORY (INHALATION)
Status: DISCONTINUED | OUTPATIENT
Start: 2021-08-10 | End: 2021-08-12

## 2021-08-10 RX ORDER — MORPHINE SULFATE 2 MG/ML
2 INJECTION, SOLUTION INTRAMUSCULAR; INTRAVENOUS
Status: DISCONTINUED | OUTPATIENT
Start: 2021-08-10 | End: 2021-08-11

## 2021-08-10 RX ORDER — CLOPIDOGREL BISULFATE 75 MG/1
75 TABLET ORAL DAILY
Status: DISCONTINUED | OUTPATIENT
Start: 2021-08-11 | End: 2021-08-13 | Stop reason: HOSPADM

## 2021-08-10 RX ORDER — SODIUM CHLORIDE 0.9 % (FLUSH) 0.9 %
10 SYRINGE (ML) INJECTION AS NEEDED
Status: DISCONTINUED | OUTPATIENT
Start: 2021-08-10 | End: 2021-08-10 | Stop reason: HOSPADM

## 2021-08-10 RX ORDER — BUPIVACAINE HCL/0.9 % NACL/PF 0.1 %
2 PLASTIC BAG, INJECTION (ML) EPIDURAL EVERY 8 HOURS
Status: COMPLETED | OUTPATIENT
Start: 2021-08-11 | End: 2021-08-12

## 2021-08-10 RX ORDER — CARVEDILOL 3.12 MG/1
3.12 TABLET ORAL 2 TIMES DAILY WITH MEALS
Status: DISCONTINUED | OUTPATIENT
Start: 2021-08-10 | End: 2021-08-10 | Stop reason: HOSPADM

## 2021-08-10 RX ORDER — LANOLIN ALCOHOL/MO/W.PET/CERES
800 CREAM (GRAM) TOPICAL
Status: DISCONTINUED | OUTPATIENT
Start: 2021-08-10 | End: 2021-08-10 | Stop reason: HOSPADM

## 2021-08-10 RX ORDER — MIDAZOLAM HYDROCHLORIDE 1 MG/ML
INJECTION INTRAMUSCULAR; INTRAVENOUS AS NEEDED
Status: DISCONTINUED | OUTPATIENT
Start: 2021-08-10 | End: 2021-08-10 | Stop reason: SURG

## 2021-08-10 RX ORDER — CHLORHEXIDINE GLUCONATE 0.12 MG/ML
15 RINSE ORAL EVERY 12 HOURS
Status: DISCONTINUED | OUTPATIENT
Start: 2021-08-10 | End: 2021-08-10 | Stop reason: SDUPTHER

## 2021-08-10 RX ORDER — HEPARIN SODIUM 200 [USP'U]/100ML
INJECTION, SOLUTION INTRAVENOUS AS NEEDED
Status: DISCONTINUED | OUTPATIENT
Start: 2021-08-10 | End: 2021-08-10 | Stop reason: HOSPADM

## 2021-08-10 RX ORDER — POTASSIUM CHLORIDE 29.8 MG/ML
20 INJECTION INTRAVENOUS
Status: COMPLETED | OUTPATIENT
Start: 2021-08-10 | End: 2021-08-10

## 2021-08-10 RX ORDER — CHLORHEXIDINE GLUCONATE 0.12 MG/ML
15 RINSE ORAL EVERY 12 HOURS SCHEDULED
Status: DISCONTINUED | OUTPATIENT
Start: 2021-08-10 | End: 2021-08-12

## 2021-08-10 RX ORDER — ATORVASTATIN CALCIUM 10 MG/1
20 TABLET, FILM COATED ORAL NIGHTLY
Status: DISCONTINUED | OUTPATIENT
Start: 2021-08-11 | End: 2021-08-13 | Stop reason: HOSPADM

## 2021-08-10 RX ORDER — DEXMEDETOMIDINE HYDROCHLORIDE 4 UG/ML
INJECTION, SOLUTION INTRAVENOUS CONTINUOUS PRN
Status: DISCONTINUED | OUTPATIENT
Start: 2021-08-10 | End: 2021-08-10 | Stop reason: SURG

## 2021-08-10 RX ORDER — HEPARIN SODIUM 1000 [USP'U]/ML
INJECTION, SOLUTION INTRAVENOUS; SUBCUTANEOUS AS NEEDED
Status: DISCONTINUED | OUTPATIENT
Start: 2021-08-10 | End: 2021-08-10 | Stop reason: SURG

## 2021-08-10 RX ORDER — VANCOMYCIN HYDROCHLORIDE 1 G/20ML
INJECTION, POWDER, LYOPHILIZED, FOR SOLUTION INTRAVENOUS AS NEEDED
Status: DISCONTINUED | OUTPATIENT
Start: 2021-08-10 | End: 2021-08-10 | Stop reason: SURG

## 2021-08-10 RX ORDER — NITROGLYCERIN 20 MG/100ML
5 INJECTION INTRAVENOUS CONTINUOUS
Status: DISCONTINUED | OUTPATIENT
Start: 2021-08-10 | End: 2021-08-12

## 2021-08-10 RX ORDER — EPTIFIBATIDE 0.75 MG/ML
INJECTION, SOLUTION INTRAVENOUS CONTINUOUS PRN
Status: COMPLETED | OUTPATIENT
Start: 2021-08-10 | End: 2021-08-10

## 2021-08-10 RX ORDER — DEXMEDETOMIDINE HYDROCHLORIDE 4 UG/ML
.2-1.5 INJECTION, SOLUTION INTRAVENOUS
Status: DISCONTINUED | OUTPATIENT
Start: 2021-08-10 | End: 2021-08-11

## 2021-08-10 RX ORDER — PROTAMINE SULFATE 10 MG/ML
INJECTION, SOLUTION INTRAVENOUS AS NEEDED
Status: DISCONTINUED | OUTPATIENT
Start: 2021-08-10 | End: 2021-08-10 | Stop reason: SURG

## 2021-08-10 RX ORDER — FENTANYL CITRATE 50 UG/ML
INJECTION, SOLUTION INTRAMUSCULAR; INTRAVENOUS AS NEEDED
Status: DISCONTINUED | OUTPATIENT
Start: 2021-08-10 | End: 2021-08-10 | Stop reason: HOSPADM

## 2021-08-10 RX ORDER — PHENYLEPHRINE HCL IN 0.9% NACL 0.8MG/10ML
SYRINGE (ML) INTRAVENOUS AS NEEDED
Status: DISCONTINUED | OUTPATIENT
Start: 2021-08-10 | End: 2021-08-10 | Stop reason: SURG

## 2021-08-10 RX ORDER — ASPIRIN 81 MG/1
81 TABLET ORAL DAILY
Status: DISCONTINUED | OUTPATIENT
Start: 2021-08-12 | End: 2021-08-13 | Stop reason: HOSPADM

## 2021-08-10 RX ORDER — METOPROLOL TARTRATE 5 MG/5ML
INJECTION INTRAVENOUS AS NEEDED
Status: DISCONTINUED | OUTPATIENT
Start: 2021-08-10 | End: 2021-08-10 | Stop reason: SURG

## 2021-08-10 RX ORDER — DIPHENHYDRAMINE HYDROCHLORIDE 50 MG/ML
INJECTION INTRAMUSCULAR; INTRAVENOUS AS NEEDED
Status: DISCONTINUED | OUTPATIENT
Start: 2021-08-10 | End: 2021-08-10 | Stop reason: HOSPADM

## 2021-08-10 RX ORDER — OXYCODONE AND ACETAMINOPHEN 10; 325 MG/1; MG/1
1 TABLET ORAL
Status: DISCONTINUED | OUTPATIENT
Start: 2021-08-10 | End: 2021-08-11

## 2021-08-10 RX ORDER — OXYCODONE HYDROCHLORIDE AND ACETAMINOPHEN 5; 325 MG/1; MG/1
1 TABLET ORAL
Status: DISCONTINUED | OUTPATIENT
Start: 2021-08-10 | End: 2021-08-11

## 2021-08-10 RX ORDER — MAGNESIUM HYDROXIDE 1200 MG/15ML
LIQUID ORAL AS NEEDED
Status: DISCONTINUED | OUTPATIENT
Start: 2021-08-10 | End: 2021-08-10 | Stop reason: HOSPADM

## 2021-08-10 RX ORDER — NEOSTIGMINE METHYLSULFATE 5 MG/5 ML
SYRINGE (ML) INTRAVENOUS AS NEEDED
Status: DISCONTINUED | OUTPATIENT
Start: 2021-08-10 | End: 2021-08-10 | Stop reason: SURG

## 2021-08-10 RX ORDER — DEXTROSE MONOHYDRATE 25 G/50ML
25-50 INJECTION, SOLUTION INTRAVENOUS
Status: DISCONTINUED | OUTPATIENT
Start: 2021-08-10 | End: 2021-08-12

## 2021-08-10 RX ORDER — POTASSIUM CHLORIDE, DEXTROSE MONOHYDRATE 150; 5 MG/100ML; G/100ML
30 INJECTION, SOLUTION INTRAVENOUS CONTINUOUS
Status: DISCONTINUED | OUTPATIENT
Start: 2021-08-10 | End: 2021-08-12

## 2021-08-10 RX ORDER — NITROGLYCERIN 0.4 MG/1
0.4 TABLET SUBLINGUAL
Status: DISCONTINUED | OUTPATIENT
Start: 2021-08-10 | End: 2021-08-10 | Stop reason: HOSPADM

## 2021-08-10 RX ORDER — PANTOPRAZOLE SODIUM 40 MG/1
40 TABLET, DELAYED RELEASE ORAL
Status: DISCONTINUED | OUTPATIENT
Start: 2021-08-10 | End: 2021-08-10 | Stop reason: HOSPADM

## 2021-08-10 RX ORDER — CEFAZOLIN SODIUM 1 G/3ML
INJECTION, POWDER, FOR SOLUTION INTRAMUSCULAR; INTRAVENOUS AS NEEDED
Status: DISCONTINUED | OUTPATIENT
Start: 2021-08-10 | End: 2021-08-10 | Stop reason: SURG

## 2021-08-10 RX ORDER — ASPIRIN 325 MG
162 TABLET ORAL ONCE
Status: COMPLETED | OUTPATIENT
Start: 2021-08-11 | End: 2021-08-11

## 2021-08-10 RX ORDER — ALBUMIN, HUMAN INJ 5% 5 %
SOLUTION INTRAVENOUS CONTINUOUS PRN
Status: DISCONTINUED | OUTPATIENT
Start: 2021-08-10 | End: 2021-08-10 | Stop reason: SURG

## 2021-08-10 RX ORDER — EPTIFIBATIDE 0.75 MG/ML
2 INJECTION, SOLUTION INTRAVENOUS CONTINUOUS
Status: DISCONTINUED | OUTPATIENT
Start: 2021-08-10 | End: 2021-08-10 | Stop reason: HOSPADM

## 2021-08-10 RX ORDER — AMIODARONE HYDROCHLORIDE 200 MG/1
400 TABLET ORAL 2 TIMES DAILY WITH MEALS
Status: DISCONTINUED | OUTPATIENT
Start: 2021-08-11 | End: 2021-08-13

## 2021-08-10 RX ORDER — ALBUTEROL SULFATE 2.5 MG/3ML
2.5 SOLUTION RESPIRATORY (INHALATION) EVERY 4 HOURS PRN
Status: ACTIVE | OUTPATIENT
Start: 2021-08-10 | End: 2021-08-11

## 2021-08-10 RX ORDER — VECURONIUM BROMIDE 1 MG/ML
INJECTION, POWDER, LYOPHILIZED, FOR SOLUTION INTRAVENOUS AS NEEDED
Status: DISCONTINUED | OUTPATIENT
Start: 2021-08-10 | End: 2021-08-10 | Stop reason: SURG

## 2021-08-10 RX ORDER — RAMIPRIL 5 MG/1
10 CAPSULE ORAL DAILY
Status: DISCONTINUED | OUTPATIENT
Start: 2021-08-10 | End: 2021-08-10 | Stop reason: HOSPADM

## 2021-08-10 RX ORDER — MEPERIDINE HYDROCHLORIDE 50 MG/ML
25 INJECTION INTRAMUSCULAR; INTRAVENOUS; SUBCUTANEOUS
Status: DISCONTINUED | OUTPATIENT
Start: 2021-08-10 | End: 2021-08-11

## 2021-08-10 RX ORDER — NEOSTIGMINE METHYLSULFATE 5 MG/5 ML
SYRINGE (ML) INTRAVENOUS AS NEEDED
Status: DISCONTINUED | OUTPATIENT
Start: 2021-08-10 | End: 2021-08-10

## 2021-08-10 RX ADMIN — POTASSIUM CHLORIDE 20 MEQ: 29.8 INJECTION, SOLUTION INTRAVENOUS at 21:25

## 2021-08-10 RX ADMIN — Medication 160 MCG: at 14:55

## 2021-08-10 RX ADMIN — POTASSIUM CHLORIDE AND DEXTROSE MONOHYDRATE 30 ML/HR: 150; 5 INJECTION, SOLUTION INTRAVENOUS at 20:33

## 2021-08-10 RX ADMIN — ASPIRIN 324 MG: 81 TABLET, CHEWABLE ORAL at 09:34

## 2021-08-10 RX ADMIN — BIVALIRUDIN 1.75 MG/KG/HR: 250 INJECTION, POWDER, LYOPHILIZED, FOR SOLUTION INTRAVENOUS at 12:15

## 2021-08-10 RX ADMIN — PROPOFOL 50 MG: 10 INJECTION, EMULSION INTRAVENOUS at 13:30

## 2021-08-10 RX ADMIN — DEXMEDETOMIDINE HYDROCHLORIDE IN 0.9% SODIUM CHLORIDE 0.5 MCG/KG/HR: 4 INJECTION INTRAVENOUS at 20:28

## 2021-08-10 RX ADMIN — GLYCOPYRROLATE 0.4 MG: 0.2 INJECTION, SOLUTION INTRAMUSCULAR; INTRAVENOUS at 19:15

## 2021-08-10 RX ADMIN — Medication 3 MG: at 19:16

## 2021-08-10 RX ADMIN — SUFENTANIL CITRATE 100 MCG: 50 INJECTION EPIDURAL; INTRAVENOUS at 13:30

## 2021-08-10 RX ADMIN — Medication 1 APPLICATION: at 21:24

## 2021-08-10 RX ADMIN — CHLORHEXIDINE GLUCONATE 15 ML: 1.2 SOLUTION ORAL at 21:25

## 2021-08-10 RX ADMIN — NITROGLYCERIN 5 MCG/MIN: 20 INJECTION INTRAVENOUS at 20:27

## 2021-08-10 RX ADMIN — METOPROLOL TARTRATE 2.5 MG: 1 INJECTION, SOLUTION INTRAVENOUS at 13:51

## 2021-08-10 RX ADMIN — SUFENTANIL CITRATE 100 MCG: 50 INJECTION EPIDURAL; INTRAVENOUS at 14:39

## 2021-08-10 RX ADMIN — IPRATROPIUM BROMIDE AND ALBUTEROL SULFATE 3 ML: 2.5; .5 SOLUTION RESPIRATORY (INHALATION) at 20:57

## 2021-08-10 RX ADMIN — POTASSIUM CHLORIDE AND DEXTROSE MONOHYDRATE 30 ML/HR: 150; 5 INJECTION, SOLUTION INTRAVENOUS at 20:32

## 2021-08-10 RX ADMIN — SUFENTANIL CITRATE 150 MCG: 50 INJECTION EPIDURAL; INTRAVENOUS at 18:02

## 2021-08-10 RX ADMIN — NITROGLYCERIN 0.4 MG: 0.4 TABLET SUBLINGUAL at 09:34

## 2021-08-10 RX ADMIN — SUFENTANIL CITRATE 50 MCG: 50 INJECTION EPIDURAL; INTRAVENOUS at 15:26

## 2021-08-10 RX ADMIN — CEFAZOLIN 2 G: 330 INJECTION, POWDER, FOR SOLUTION INTRAMUSCULAR; INTRAVENOUS at 13:39

## 2021-08-10 RX ADMIN — MIDAZOLAM 3 MG: 1 INJECTION INTRAMUSCULAR; INTRAVENOUS at 13:28

## 2021-08-10 RX ADMIN — AMINOCAPROIC ACID 1 G/HR: 250 INJECTION, SOLUTION INTRAVENOUS at 13:38

## 2021-08-10 RX ADMIN — SUFENTANIL CITRATE 100 MCG: 50 INJECTION EPIDURAL; INTRAVENOUS at 15:52

## 2021-08-10 RX ADMIN — CEFAZOLIN 1 G: 330 INJECTION, POWDER, FOR SOLUTION INTRAMUSCULAR; INTRAVENOUS at 17:11

## 2021-08-10 RX ADMIN — AMINOCAPROIC ACID 5 G: 250 INJECTION, SOLUTION INTRAVENOUS at 13:40

## 2021-08-10 RX ADMIN — VANCOMYCIN HYDROCHLORIDE 1250 MG: 1 INJECTION, POWDER, LYOPHILIZED, FOR SOLUTION INTRAVENOUS at 13:50

## 2021-08-10 RX ADMIN — ONDANSETRON 4 MG: 2 INJECTION INTRAMUSCULAR; INTRAVENOUS at 23:53

## 2021-08-10 RX ADMIN — PROTAMINE SULFATE 250 MG: 10 INJECTION, SOLUTION INTRAVENOUS at 18:18

## 2021-08-10 RX ADMIN — HEPARIN SODIUM 30000 UNITS: 1000 INJECTION, SOLUTION INTRAVENOUS; SUBCUTANEOUS at 14:54

## 2021-08-10 RX ADMIN — MIDAZOLAM 2 MG: 1 INJECTION INTRAMUSCULAR; INTRAVENOUS at 13:25

## 2021-08-10 RX ADMIN — PERFLUTREN 8.48 ML: 6.52 INJECTION, SUSPENSION INTRAVENOUS at 12:53

## 2021-08-10 RX ADMIN — EPTIFIBATIDE 2 MCG/KG/MIN: 75 INJECTION INTRAVENOUS at 12:16

## 2021-08-10 RX ADMIN — AMIODARONE HYDROCHLORIDE 0.5 MG/MIN: 1.8 INJECTION, SOLUTION INTRAVENOUS at 21:13

## 2021-08-10 RX ADMIN — VECURONIUM BROMIDE 20 MG: 1 INJECTION, POWDER, LYOPHILIZED, FOR SOLUTION INTRAVENOUS at 13:30

## 2021-08-10 RX ADMIN — MORPHINE SULFATE 2 MG: 2 INJECTION, SOLUTION INTRAMUSCULAR; INTRAVENOUS at 23:53

## 2021-08-10 RX ADMIN — PHENYLEPHRINE HYDROCHLORIDE 0.2 MCG/KG/MIN: 10 INJECTION INTRAVENOUS at 21:41

## 2021-08-10 RX ADMIN — LIDOCAINE HYDROCHLORIDE 100 MG: 20 INJECTION, SOLUTION EPIDURAL; INFILTRATION; INTRACAUDAL; PERINEURAL at 13:30

## 2021-08-10 RX ADMIN — DEXMEDETOMIDINE HYDROCHLORIDE IN 0.9% SODIUM CHLORIDE 0.8 MCG/KG/HR: 4 INJECTION INTRAVENOUS at 22:20

## 2021-08-10 RX ADMIN — MIDAZOLAM 5 MG: 1 INJECTION INTRAMUSCULAR; INTRAVENOUS at 18:04

## 2021-08-10 RX ADMIN — NITROGLYCERIN 0.4 MG: 0.4 TABLET SUBLINGUAL at 10:05

## 2021-08-10 RX ADMIN — ALBUMIN HUMAN: 0.05 INJECTION, SOLUTION INTRAVENOUS at 18:15

## 2021-08-10 RX ADMIN — MORPHINE SULFATE 2 MG: 2 INJECTION, SOLUTION INTRAMUSCULAR; INTRAVENOUS at 20:48

## 2021-08-10 RX ADMIN — DEXMEDETOMIDINE HYDROCHLORIDE 0.5 MCG/KG/HR: 4 INJECTION, SOLUTION INTRAVENOUS at 17:56

## 2021-08-10 NOTE — ANESTHESIA PROCEDURE NOTES
Preanesthesia Checklist:  Patient identified, IV assessed, risks and benefits discussed, monitors and equipment assessed, procedure being performed at surgeon's request and anesthesia consent obtained.    General Procedure Information  CHARITY Placed for monitoring purposes only -- This is not a diagnostic CHARITY  Diagnostic Indications for Echo:  hemodynamic monitoring  Physician Requesting Echo: Zana Hernandez MD  Location performed:  OR  Intubated  Bite block not placed  Heart visualized  Probe Insertion:  Easy  Probe Type:  Multiplane  Modalities:  2D only, color flow mapping, continuous wave Doppler and pulse wave Doppler        Anesthesia Information  Performed Personally  Anesthesiologist:  Ellie Darnell MD      Echocardiogram Comments:       CHARITY placed at surgeon request.     Please see cardiology diagnostic evaluation for complete report.

## 2021-08-10 NOTE — PERIOPERATIVE NURSING NOTE
Patient identified before entering OR using armbands, surgical consent verified, and armbands removed.  Cerebral oximeter pads placed on patient's forehead just above the eyebrows.  Patient transferred to OR table without difficulty. 5 lead EKG and balloon pump leads placed and covered with tape, and fast patches applied with tegaderm covers applied. Cerebral oximeter pads connected to fore site and fast patches connected to defibrillator prior to induction.      ICU NOTIFICATION  SURGERY START-1420  ON BYPASS-1547  OFF BYPASS-1811      VEIN HARVEST  START-1420  END-1619      VEIN HARVEST SITE-BLE      PERFORMED BY-KAIT BALDWIN CST/CFA & ROGELIO KEMP CST/CFA

## 2021-08-10 NOTE — ANESTHESIA PROCEDURE NOTES
Airway  Urgency: elective    Date/Time: 8/10/2021 1:33 PM  Airway not difficult    General Information and Staff    Patient location during procedure: OR  CRNA: Yony Paulino CRNA    Indications and Patient Condition  Indications for airway management: airway protection    Preoxygenated: yes  MILS maintained throughout  Mask difficulty assessment: 1 - vent by mask    Final Airway Details  Final airway type: endotracheal airway      Successful airway: ETT  Cuffed: yes   Successful intubation technique: direct laryngoscopy  Endotracheal tube insertion site: oral  Blade: Vanessa  Blade size: 3.5  ETT size (mm): 8.0  Cormack-Lehane Classification: grade I - full view of glottis  Placement verified by: chest auscultation and capnometry   Cuff volume (mL): 5  Measured from: lips  ETT/EBT  to lips (cm): 22  Number of attempts at approach: 1  Assessment: lips, teeth, and gum same as pre-op and atraumatic intubation

## 2021-08-10 NOTE — CONSULTS
Referring Provider: Dr. Chung  Reason for Consultation: Multivessel CAD    Patient Care Team:  Logan Bryant MD as PCP - General  Logan Bryant MD as PCP - Family Medicine  Lv Ferrera MD as Referring Physician (Orthopedic Surgery)  Logan Jj MD as Cardiologist (Cardiology)    Chief complaint Chest pain    Subjective .     History of present illness:  Mr. Mayen is a 60 year old male with a pertinent past medical history of coronary artery disease with stent placement x 4 in 1999 by Dr. Jj following MI, hypertension, hyperlipidemia, and GERD.  He presented to the ER early this morning with complaints of constant chest pressure for the past 3 days.  He reported that he was hiking with friends and felt a burning in his chest and ache in the left arm.  Symptoms did not resolve.  He denies any alleviating or exacerbating factors.  He denies lower extremity edema, syncope, or shortness of breath.  Upon arrival to the ER workup ensued revealing troponin 0.333 and ST changed on EKD.  Code STEMI was called and Dr. Chung evaluated the patient.  He was taken to the cath lab this morning revealing 70-80% stenosis of the LAD with 70% stenosis of proximal diagonal, distally occluded LCX that was treated with PTCA with extablishment of WILLIAM-3 flow, and RCA with chronic total occlusion.  Cardiothoracic surgery has been consulted for consideration of CABG.  The patient is currently resting in bed with no complaints of pain.  Integrilin and Angiomax infusing.  He reports that he drinks approximately every 2-3 days 7-8 beers and sometimes whiskey.  He states he does not do this all the time and can go months without drinking.  He is a never smoker.  He denies history of stroke, kidney disease, liver disease, and diabetes mellitus.  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  His girlfriend is at bedside.  He states that he did have appendicitis with rupture in 11/2020 and was sent home with IV  "antibiotics with plans for outpatient follow up however patient has been lost in follow up.  He states that \"no one called me about it and I didn't call anyone.\"      Telemetry: Sinus bradycardia 50s  IV gtts: Integrilin, Angiomax    History  There are no questions and answers to display.         Past Medical History:   Diagnosis Date   • Coronary artery disease    • Hyperlipidemia    • Hypertension    • Myocardial infarction (CMS/HCC)    ,   Past Surgical History:   Procedure Laterality Date   • CARDIAC CATHETERIZATION     • CATARACT EXTRACTION, BILATERAL     • CORONARY STENT PLACEMENT     • PERIPHERAL ARTERIAL STENT GRAFT     ,   Family History   Problem Relation Age of Onset   • Lung disease Mother    • Heart attack Father    • Heart disease Father    • Lung disease Sister    • Rheum arthritis Sister    • Cancer Sister    • Rheum arthritis Sister    • Liver disease Sister    ,   Social History     Tobacco Use   • Smoking status: Never Smoker   • Smokeless tobacco: Never Used   Substance Use Topics   • Alcohol use: Yes     Alcohol/week: 6.0 standard drinks     Types: 6 Cans of beer per week     Comment: 2-3 x a week   • Drug use: Yes     Types: Marijuana     Comment: sometimes   ,   Medications Prior to Admission   Medication Sig Dispense Refill Last Dose   • carvedilol (COREG) 3.125 MG tablet Take 3.125 mg by mouth 2 (Two) Times a Day With Meals.      • Cholecalciferol (VITAMIN D3) 125 MCG (5000 UT) capsule capsule Take 5,000 Units by mouth Daily.      • folic acid (FOLVITE) 800 MCG tablet Take 1 tablet by mouth Daily.      • HYDROcodone-acetaminophen (NORCO) 7.5-325 MG per tablet Take 1 tablet by mouth Every 4 (Four) Hours As Needed for Moderate Pain  for up to 40 doses. 12 tablet 0    • Krill Oil 350 MG capsule Take 2 tablets by mouth Daily.      • pantoprazole (PROTONIX) 40 MG EC tablet Take 1 tablet by mouth Daily. 30 tablet 0    • ramipril (ALTACE) 10 MG capsule Take 10 mg by mouth Daily.      • sildenafil " "(REVATIO) 20 MG tablet Take 40 mg by mouth Daily As Needed. Take 2 tablets daily as needed for erectile dysfunction.      • Turmeric 500 MG capsule Take 2 tablets by mouth Daily.      , Allergies: Patient has no known allergies.    Review of Systems  Review of Systems   Constitutional: Positive for activity change. Negative for chills, diaphoresis and fever.   HENT: Negative for trouble swallowing and voice change.    Eyes: Negative for visual disturbance.   Respiratory: Positive for chest tightness. Negative for shortness of breath.    Cardiovascular: Positive for chest pain. Negative for palpitations and leg swelling.   Gastrointestinal: Negative for abdominal pain, constipation, diarrhea, nausea and vomiting.   Musculoskeletal: Negative for arthralgias and myalgias.   Skin: Negative for color change, pallor, rash and wound.   Neurological: Negative for dizziness, syncope and light-headedness.   Psychiatric/Behavioral: Negative for agitation, confusion and sleep disturbance.        Objective     Vital Signs   Visit Vitals  /77   Pulse 58   Temp 97.5 °F (36.4 °C) (Oral)   Resp 16   Ht 170.2 cm (67\")   Wt 82 kg (180 lb 12.4 oz)   SpO2 98%   BMI 28.31 kg/m²       Physical Exam  Vitals reviewed.   Constitutional:       Appearance: Normal appearance.   HENT:      Head: Normocephalic.   Eyes:      Pupils: Pupils are equal, round, and reactive to light.   Cardiovascular:      Rate and Rhythm: Regular rhythm. Bradycardia present.      Heart sounds: Normal heart sounds. No murmur heard.     Pulmonary:      Breath sounds: Normal breath sounds. No wheezing or rales.   Abdominal:      General: There is no distension.      Palpations: Abdomen is soft.      Tenderness: There is no abdominal tenderness.   Musculoskeletal:         General: No swelling or tenderness.   Skin:     General: Skin is warm and dry.   Neurological:      General: No focal deficit present.      Mental Status: He is alert and oriented to person, " place, and time.   Psychiatric:         Mood and Affect: Mood normal.         Behavior: Behavior normal.         Thought Content: Thought content normal.         Judgment: Judgment normal.           LAB:            IMAGES:       Imaging Results (Last 24 Hours)     Procedure Component Value Units Date/Time     Carotid Bilateral [[id number]] Resulted: 08/10/21 1225     Updated: 08/10/21 1225    XR Chest 1 View [[id number]] Collected: 08/10/21 1004     Updated: 08/10/21 1007    Narrative:      EXAM: XR CHEST 1 VW-     INDICATION: Chest Pain Triage Protocol     COMPARISON: None available.     FINDINGS:     Cardiac silhouette is normal in size. No pleural effusion or  pneumothorax. No focal airspace opacity. No acute osseous findings.       Impression:         No acute findings.  This report was finalized on 08/10/2021 10:04 by Dr. Ulysses Liz MD.                       Assessment/Plan        Coronary artery disease involving native coronary artery of native heart    GERD (gastroesophageal reflux disease)    STEMI (ST elevation myocardial infarction) (CMS/[facility])    Hypertension    Hyperlipidemia    Alcohol use      I discussed the patient's findings and my recommendations with patient and significant other, We discussed the options for treatment of coronary artery disease to include medical therapy, coronary stenting, and surgical revascularization.  We discussed the pros and cons of each option and how it pertains to the current case.  Additional testing has been ordered including transthoracic echo, hemoglobin A1C, and bilateral carotid ultrasound.  The STS Risk score will be calculated using the STS Risk Calculator and discussed with the patient and family.  Coronary artery bypass grafting is best option given patient's findings and risk factors.  We discussed the operative conduct and expected hospital and outpatient recovery.  Risks were discussed to include but not limited to bleeding, infection, stroke, heart  attack, need for additional procedures, anesthesia risk, organ dysfunction and/or death, prolonged mechanical ventilation, prolonged ICU stay, chronic pain syndromes, sternal nonunion, and/or death.  We discussed the need to utilize all medical treatments additionally prescribed post surgery.       The patient and family understands the risks, benefits, and alternatives and he wishes to proceed forward with surgery.  Await additional testing.  Plan for CABG this afternoon by Dr. Hernandez.      Viry Garcia, APRN  08/10/21  12:40 CDT

## 2021-08-10 NOTE — ANESTHESIA PROCEDURE NOTES
Arterial Line       Preanesthetic Checklist  Completed: patient identified, IV checked, site marked, risks and benefits discussed, surgical consent, monitors and equipment checked, pre-op evaluation and timeout performed  Arterial Line Prep   Sterile Tech: cap, gloves, sterile barriers and mask  Prep: ChloraPrep  Patient monitoring: EKG, continuous pulse oximetry and blood pressure monitoring  Arterial Line Procedure   Laterality:left  Location:  radial artery  Catheter size: 20 G   Guidance: ultrasound guided  PROCEDURE NOTE/ULTRASOUND INTERPRETATION.  Using ultrasound guidance the potential vascular sites for insertion of the catheter were visualized to determine the patency of the vessel to be used for vascular access.  After selecting the appropriate site for insertion, the needle was visualized under ultrasound being inserted into the radial artery, followed by ultrasound confirmation of wire and catheter placement. There were no abnormalities seen on ultrasound; an image was taken; and the patient tolerated the procedure with no complications.   Number of attempts: 1  Successful placement: yes  Post Assessment   Dressing Type: wrist guard applied, secured with tape and occlusive dressing applied.   Complications no  Circ/Move/Sens Assessment: normal and unchanged.   Patient Tolerance: patient tolerated the procedure well with no apparent complications

## 2021-08-10 NOTE — ED PROVIDER NOTES
Subjective   Patient is a pleasant 61 yo M that presents to the ER today for further evaluation of chest pain.  The patient reports that his symptoms began on Sunday.  He states that he was hiking with a group of people at a steady pace with multiple inclines and began to feel a burning sensation in his chest.  He states that he is also having an aching in his left arm.  He states that this is been constant since that time.  He states that this feels the same as when he had his heart attack in 1999.  He has a history of 4 stents in the past.  He follows with Dr. Jj.  His last visit was in 2019.  The patient has a history of CAD, hypertension, hyperlipidemia, and myocardial infarction.  Last stress echo showed low risk for ischemia in March 2019.  Patient denies shortness of breath, lower extremity edema, nausea or vomiting.  He denies any numbness or tingling, denies weakness to left arm. He states that he last used ED medications 1 week ago. He presents here today for further evaluation.      History provided by:  Patient   used: No    Chest Pain  Pain location:  Substernal area  Pain quality: burning    Pain radiates to:  L arm  Pain severity:  Mild  Onset quality:  Sudden  Duration:  3 days  Timing:  Constant  Progression:  Unchanged  Chronicity:  New  Context: not breathing, not drug use, not eating, not intercourse, not lifting, not movement, not raising an arm, not at rest, not stress and not trauma    Relieved by:  Nothing  Worsened by:  Nothing  Ineffective treatments:  None tried  Associated symptoms: heartburn    Associated symptoms: no abdominal pain, no AICD problem, no altered mental status, no anorexia, no anxiety, no back pain, no claudication, no cough, no diaphoresis, no dizziness, no dysphagia, no fatigue, no fever, no headache, no lower extremity edema, no nausea, no near-syncope, no numbness, no orthopnea, no palpitations, no PND, no shortness of breath, no syncope, no  vomiting and no weakness    Risk factors: coronary artery disease, high cholesterol, hypertension and male sex    Risk factors: no aortic disease, no birth control, no diabetes mellitus, no Ashleigh-Danlos syndrome, no immobilization, no Marfan's syndrome, not obese, not pregnant, no prior DVT/PE, no smoking and no surgery        Review of Systems   Constitutional: Negative for diaphoresis, fatigue and fever.   HENT: Negative for trouble swallowing.    Respiratory: Negative for cough and shortness of breath.    Cardiovascular: Positive for chest pain. Negative for palpitations, orthopnea, claudication, syncope, PND and near-syncope.   Gastrointestinal: Positive for heartburn. Negative for abdominal pain, anorexia, nausea and vomiting.   Musculoskeletal: Negative for back pain.   Neurological: Negative for dizziness, weakness, numbness and headaches.   All other systems reviewed and are negative.      Past Medical History:   Diagnosis Date   • Coronary artery disease    • Hyperlipidemia    • Hypertension    • Myocardial infarction (CMS/HCC)        No Known Allergies    Past Surgical History:   Procedure Laterality Date   • CARDIAC CATHETERIZATION     • CARDIAC CATHETERIZATION N/A 8/10/2021    Procedure: Left Heart Cath;  Surgeon: Alessio Chung MD;  Location:  [facility];  Service: Cardiovascular;  Laterality: N/A;   • CATARACT EXTRACTION, BILATERAL     • CORONARY ARTERY BYPASS GRAFT N/A 8/10/2021    Procedure: CORONARY ARTERY BYPASS GRAFT X 4 WITH LEFT INTERNAL MAMMARY ARTERY, RIGHT LOWER EXTREMITY ENDOSCOPIC VEIN HARVEST, BILATERAL LOWER EXTREMITY OPEN VEIN HARVEST, AND PERFUSION; TRANSESOPHAGEAL ECHOCARDIOGRAM;  Surgeon: Zana Hernandez MD;  Location: [facility] OR;  Service: Cardiothoracic;  Laterality: N/A;   • CORONARY STENT PLACEMENT     • PERIPHERAL ARTERIAL STENT GRAFT         Family History   Problem Relation Age of Onset   • Lung disease Mother    • Heart attack Father    • Heart disease  Father    • Lung disease Sister    • Rheum arthritis Sister    • Cancer Sister    • Rheum arthritis Sister    • Liver disease Sister        Social History     Socioeconomic History   • Marital status:      Spouse name: Not on file   • Number of children: Not on file   • Years of education: Not on file   • Highest education level: Not on file   Tobacco Use   • Smoking status: Never Smoker   • Smokeless tobacco: Never Used   Substance and Sexual Activity   • Alcohol use: Yes     Alcohol/week: 6.0 standard drinks     Types: 6 Cans of beer per week     Comment: 2-3 x a week   • Drug use: Yes     Types: Marijuana     Comment: sometimes           Objective   Physical Exam  Vitals and nursing note reviewed.   Constitutional:       Appearance: Normal appearance.   HENT:      Head: Normocephalic and atraumatic.   Eyes:      Conjunctiva/sclera: Conjunctivae normal.   Cardiovascular:      Rate and Rhythm: Normal rate and regular rhythm.   Pulmonary:      Effort: Pulmonary effort is normal.      Breath sounds: Normal breath sounds.   Abdominal:      General: Bowel sounds are normal.      Palpations: Abdomen is soft.   Skin:     General: Skin is warm and dry.      Capillary Refill: Capillary refill takes less than 2 seconds.   Neurological:      General: No focal deficit present.      Mental Status: He is alert.   Psychiatric:         Mood and Affect: Mood normal.         Procedures           ED Course  ED Course as of Aug 20 1737   Tue Aug 10, 2021   0904 Is EKG shows minimal ST segment elevation with no reciprocal changes.  Is more prominent in lead III that is more prominent the patient having chest pain relieved with nitro slightly but does not appear in any significant distress describes pain as indigestion . Cardiology called to review ekg and patient     [TS]   1008 Called cardiology and send them the EKG since the patient's troponin is elevated and EKG was abnormal Dr. Chung answered immediately reviewed the EKG .   But the patient symptoms elevated Trop and the abnormal EKG patient is having acute injury keeping consideration his troponin I etc. he was at the patient bedside immediately reviewed the EKG again code STEMI was being called    [TS]   1009 Pt case discussed with Dr. Jessica; call placed to Dr. Chung. Pt has had minimal relief of pain with nitro. Dr. Jessica called and spoke with Dr. Chung. Repeat EKG ordered. CODE STEMI called.     [TS]   1011 Pain is  back will  repeat EKG    [TS]   1017 Dr. Chung in to see pt; aware of repeat EKG findings and elevated dimer. Pt will go to Cath lab.     [LF]      ED Course User Index  [LF] Dilia House, APRN  [TS] Rodriguez Jessica MD                                   XR Chest PA & Lateral   Final Result   1. Stable postoperative appearance of the chest.   This report was finalized on 08/13/2021 06:58 by Dr Shruthi Freeman MD.      XR Chest 1 View   Final Result   1. Improved aeration of the lung bases.   This report was finalized on 08/12/2021 07:43 by Dr Shruthi Freeman MD.      XR Chest 1 View   Final Result   1. Possible retrocardiac opacity.   2. Lines and tubes as above.   This report was finalized on 08/11/2021 07:57 by Dr Shruthi Freeman MD.      XR Chest 1 View   Final Result   1.. Status post median sternotomy with multiple support lines in place   and no complications.   2. Expiratory chest with bibasilar atelectasis.   This report was finalized on 08/10/2021 20:59 by Dr. Antony Garcia MD.      US Carotid Bilateral   Final Result   Impression:   1. There is less than 50% stenosis of the right internal carotid artery.   2. There is less than 50% stenosis of the left internal carotid artery.   3. Antegrade flow is demonstrated in bilateral vertebral arteries.       Comments: Bilateral carotid vertebral arterial duplex scan was   performed.       Grayscale imaging shows intimal thickening and calcified elements at the   carotid bifurcation. The right internal carotid  artery peak systolic   velocity is 56.9 cm/sec. The end-diastolic velocity is 24.5 cm/sec. The   right ICA/CCA ratio is approximately 0.9 . These findings correlate with   less than 50% stenosis of the right internal carotid artery.       Grayscale imaging shows intimal thickening and calcified elements at the   carotid bifurcation. The left internal carotid artery peak systolic   velocity is 69.4 cm/sec. The end-diastolic velocity is 31.5 cm/sec. The   left ICA/CCA ratio is approximately 1.0 . These findings correlate with   less than 50% stenosis of the left internal carotid artery.       Antegrade flow is demonstrated in bilateral vertebral arteries.       This report was finalized on 08/11/2021 16:34 by Dr. Simon Tucker MD.      XR Chest 1 View   Final Result       No acute findings.   This report was finalized on 08/10/2021 10:04 by Dr. Ulysses Liz MD.        Labs Reviewed   COMPREHENSIVE METABOLIC PANEL - Abnormal; Notable for the following components:       Result Value    Glucose 114 (*)     AST (SGOT) 68 (*)     All other components within normal limits    Narrative:     GFR Normal >60  Chronic Kidney Disease <60  Kidney Failure <15     TROPONIN (IN-HOUSE) - Abnormal; Notable for the following components:    Troponin T 0.333 (*)     All other components within normal limits    Narrative:     Troponin T Reference Range:  <= 0.03 ng/mL-   Negative for AMI  >0.03 ng/mL-     Abnormal for myocardial necrosis.  Clinicians would have to utilize clinical acumen, EKG, Troponin and serial changes to determine if it is an Acute Myocardial Infarction or myocardial injury due to an underlying chronic condition.       Results may be falsely decreased if patient taking Biotin.     TROPONIN (IN-HOUSE) - Abnormal; Notable for the following components:    Troponin T 0.388 (*)     All other components within normal limits    Narrative:     Troponin T Reference Range:  <= 0.03 ng/mL-   Negative for AMI  >0.03 ng/mL-      Abnormal for myocardial necrosis.  Clinicians would have to utilize clinical acumen, EKG, Troponin and serial changes to determine if it is an Acute Myocardial Infarction or myocardial injury due to an underlying chronic condition.       Results may be falsely decreased if patient taking Biotin.     CBC WITH AUTO DIFFERENTIAL - Abnormal; Notable for the following components:    RDW 11.7 (*)     Lymphocyte % 15.5 (*)     All other components within normal limits   D-DIMER, QUANTITATIVE - Abnormal; Notable for the following components:    D-Dimer, Quantitative 8.48 (*)     All other components within normal limits    Narrative:     Reference Range is 0-0.50 mg/L FEU. However, results <0.50 mg/L FEU tends to rule out DVT or PE. Results >0.50 mg/L FEU are not useful in predicting absence or presence of DVT or PE.     CBC (NO DIFF) - Abnormal; Notable for the following components:    RDW 11.8 (*)     All other components within normal limits   BLOOD GAS, ARTERIAL W/CO-OXIMETRY - Abnormal; Notable for the following components:    pO2, Arterial >547.0 (*)     Base Excess, Arterial -1.0 (*)     O2 Saturation, Arterial >100.1 (*)     pO2, Temperature Corrected >547 (*)     All other components within normal limits   BLOOD GAS, ARTERIAL W/CO-OXIMETRY - Abnormal; Notable for the following components:    pH, Arterial 7.267 (*)     pCO2, Arterial 55.0 (*)     pO2, Arterial 538.0 (*)     Base Excess, Arterial -2.8 (*)     O2 Saturation, Arterial >100.1 (*)     pH, Temp Corrected 7.267 (*)     pCO2, Temperature Corrected 55.0 (*)     pO2, Temperature Corrected 538 (*)     All other components within normal limits   BLOOD GAS, VENOUS W/CO-OXIMETRY - Abnormal; Notable for the following components:    HCO3, Venous 29.4 (*)     Base Excess, Venous 3.8 (*)     Hemoglobin, Blood Gas 10.1 (*)     All other components within normal limits   BLOOD GAS, ARTERIAL W/CO-OXIMETRY - Abnormal; Notable for the following components:    pO2,  Arterial 453.0 (*)     HCO3, Arterial 27.5 (*)     Base Excess, Arterial 2.7 (*)     O2 Saturation, Arterial >100.1 (*)     Hemoglobin, Blood Gas 9.3 (*)     Hematocrit, Blood Gas 28.5 (*)     Ionized Calcium 4.15 (*)     pO2, Temperature Corrected 453 (*)     All other components within normal limits   BLOOD GAS, ARTERIAL W/CO-OXIMETRY - Abnormal; Notable for the following components:    pO2, Arterial 386.0 (*)     HCO3, Arterial 27.0 (*)     Base Excess, Arterial 2.6 (*)     O2 Saturation, Arterial >100.1 (*)     Hemoglobin, Blood Gas 9.4 (*)     Hematocrit, Blood Gas 28.8 (*)     Potassium, Arterial 5.3 (*)     Ionized Calcium 4.18 (*)     pO2, Temperature Corrected 386 (*)     All other components within normal limits   BLOOD GAS, ARTERIAL W/CO-OXIMETRY - Abnormal; Notable for the following components:    pO2, Arterial 389.0 (*)     HCO3, Arterial 26.7 (*)     O2 Saturation, Arterial >100.1 (*)     Hemoglobin, Blood Gas 8.9 (*)     Hematocrit, Blood Gas 27.1 (*)     pO2, Temperature Corrected 389 (*)     All other components within normal limits   CBC (NO DIFF) - Abnormal; Notable for the following components:    RBC 2.42 (*)     Hemoglobin 8.0 (*)     Hematocrit 22.4 (*)     MCH 33.1 (*)     RDW 11.9 (*)     Platelets 113 (*)     All other components within normal limits   FIBRINOGEN - Abnormal; Notable for the following components:    Fibrinogen 167 (*)     All other components within normal limits   PROTIME-INR - Abnormal; Notable for the following components:    Protime 21.8 (*)     INR 2.07 (*)     All other components within normal limits   APTT - Abnormal; Notable for the following components:    PTT 49.5 (*)     All other components within normal limits   PROTIME-INR - Abnormal; Notable for the following components:    Protime 19.4 (*)     INR 1.78 (*)     All other components within normal limits   APTT - Abnormal; Notable for the following components:    PTT 42.2 (*)     All other components within  normal limits   CBC (NO DIFF) - Abnormal; Notable for the following components:    RBC 2.80 (*)     Hemoglobin 9.2 (*)     Hematocrit 26.1 (*)     RDW 11.7 (*)     Platelets 106 (*)     All other components within normal limits   CBC (NO DIFF) - Abnormal; Notable for the following components:    RBC 2.69 (*)     Hemoglobin 8.8 (*)     Hematocrit 25.2 (*)     RDW 12.0 (*)     Platelets 135 (*)     All other components within normal limits   RENAL FUNCTION PANEL - Abnormal; Notable for the following components:    Glucose 119 (*)     Creatinine 0.74 (*)     Chloride 110 (*)     Calcium 8.2 (*)     All other components within normal limits    Narrative:     GFR Normal >60  Chronic Kidney Disease <60  Kidney Failure <15     RENAL FUNCTION PANEL - Abnormal; Notable for the following components:    Glucose 179 (*)     Chloride 108 (*)     All other components within normal limits    Narrative:     GFR Normal >60  Chronic Kidney Disease <60  Kidney Failure <15     RENAL FUNCTION PANEL - Abnormal; Notable for the following components:    Glucose 143 (*)     Chloride 109 (*)     Calcium 8.5 (*)     All other components within normal limits    Narrative:     GFR Normal >60  Chronic Kidney Disease <60  Kidney Failure <15     PROTIME-INR - Abnormal; Notable for the following components:    Protime 15.0 (*)     INR 1.28 (*)     All other components within normal limits   CBC WITH AUTO DIFFERENTIAL - Abnormal; Notable for the following components:    RBC 2.44 (*)     Hemoglobin 8.1 (*)     Hematocrit 22.6 (*)     MCH 33.2 (*)     MCHC 35.8 (*)     RDW 11.9 (*)     Platelets 114 (*)     Neutrophil % 77.7 (*)     Lymphocyte % 15.0 (*)     All other components within normal limits   BLOOD GAS, ARTERIAL W/CO-OXIMETRY - Abnormal; Notable for the following components:    pCO2, Arterial 45.1 (*)     pO2, Arterial 153.0 (*)     HCO3, Arterial 26.7 (*)     O2 Saturation, Arterial 99.7 (*)     Hemoglobin, Blood Gas 9.6 (*)     Hematocrit,  Blood Gas 29.5 (*)     pCO2, Temperature Corrected 45.1 (*)     pO2, Temperature Corrected 153 (*)     All other components within normal limits   BLOOD GAS, ARTERIAL - Abnormal; Notable for the following components:    pCO2, Arterial 48.5 (*)     pO2, Arterial 76.2 (*)     HCO3, Arterial 27.6 (*)     pCO2, Temperature Corrected 48.5 (*)     pO2, Temperature Corrected 76.2 (*)     All other components within normal limits   BLOOD GAS, ARTERIAL - Abnormal; Notable for the following components:    pH, Arterial 7.344 (*)     pCO2, Arterial 48.7 (*)     pO2, Arterial 70.7 (*)     HCO3, Arterial 26.5 (*)     pCO2, Temperature Corrected 48.7 (*)     pH, Temp Corrected 7.344 (*)     pO2, Temperature Corrected 70.7 (*)     All other components within normal limits   BLOOD GAS, ARTERIAL - Abnormal; Notable for the following components:    pO2, Arterial 75.0 (*)     HCO3, Arterial 27.6 (*)     Base Excess, Arterial 2.3 (*)     pO2, Temperature Corrected 75.0 (*)     All other components within normal limits   CBC (NO DIFF) - Abnormal; Notable for the following components:    RBC 2.57 (*)     Hemoglobin 8.4 (*)     Hematocrit 24.1 (*)     RDW 11.9 (*)     Platelets 120 (*)     All other components within normal limits   BLOOD GAS, ARTERIAL - Abnormal; Notable for the following components:    HCO3, Arterial 26.9 (*)     Base Excess, Arterial 2.1 (*)     All other components within normal limits   CBC (NO DIFF) - Abnormal; Notable for the following components:    RBC 2.17 (*)     Hemoglobin 7.1 (*)     Hematocrit 20.7 (*)     RDW 12.0 (*)     Platelets 107 (*)     All other components within normal limits   BASIC METABOLIC PANEL - Abnormal; Notable for the following components:    Glucose 142 (*)     Creatinine 0.68 (*)     Sodium 135 (*)     Calcium 8.2 (*)     All other components within normal limits    Narrative:     GFR Normal >60  Chronic Kidney Disease <60  Kidney Failure <15     CBC (NO DIFF) - Abnormal; Notable for the  following components:    RBC 2.73 (*)     Hemoglobin 8.8 (*)     Hematocrit 25.8 (*)     RDW 12.2 (*)     Platelets 131 (*)     All other components within normal limits   BASIC METABOLIC PANEL - Abnormal; Notable for the following components:    Glucose 119 (*)     Creatinine 0.73 (*)     All other components within normal limits    Narrative:     GFR Normal >60  Chronic Kidney Disease <60  Kidney Failure <15     POCT GLUCOSE FINGERSTICK - Abnormal; Notable for the following components:    Glucose 173 (*)     All other components within normal limits   POCT GLUCOSE FINGERSTICK - Abnormal; Notable for the following components:    Glucose 166 (*)     All other components within normal limits   POCT GLUCOSE FINGERSTICK - Abnormal; Notable for the following components:    Glucose 146 (*)     All other components within normal limits   POCT GLUCOSE FINGERSTICK - Abnormal; Notable for the following components:    Glucose 131 (*)     All other components within normal limits   POCT GLUCOSE FINGERSTICK - Abnormal; Notable for the following components:    Glucose 154 (*)     All other components within normal limits   COVID-19,[facility] IN-HOUSE,[facility] SWAB NO TRANSPORT MEDIA 2 HR TAT - Normal    Narrative:     Fact sheet for providers: https://www.fda.gov/media/948830/download     Fact sheet for patients: https://www.fda.gov/media/632128/download    Test performed by PCR.    Consider negative results in combination with clinical observations, patient history, and epidemiological information.  Fact sheet for providers: https://www.fda.gov/media/528432/download     Fact sheet for patients: https://www.fda.gov/media/699523/download    Test performed by PCR.    Consider negative results in combination with clinical observations, patient history, and epidemiological information.   PROTIME-INR - Normal   LIPASE - Normal   BNP (IN-HOUSE) - Normal    Narrative:     Among patients with dyspnea, NT-proBNP is highly sensitive for the  detection of acute congestive heart failure. In addition NT-proBNP of <300 pg/ml effectively rules out acute congestive heart failure with 99% negative predictive value.    Results may be falsely decreased if patient taking Biotin.     HEMOGLOBIN A1C - Normal    Narrative:     Hemoglobin A1C Ranges:    Increased Risk for Diabetes  5.7% to 6.4%  Diabetes                     >= 6.5%  Diabetic Goal                < 7.0%   MAGNESIUM - Normal   POCT GLUCOSE FINGERSTICK - Normal   POCT GLUCOSE FINGERSTICK - Normal   POCT GLUCOSE FINGERSTICK - Normal   POCT GLUCOSE FINGERSTICK - Normal   POCT GLUCOSE FINGERSTICK - Normal   POCT GLUCOSE FINGERSTICK - Normal   POCT GLUCOSE FINGERSTICK - Normal   POCT GLUCOSE FINGERSTICK - Normal   POCT GLUCOSE FINGERSTICK - Normal   POCT GLUCOSE FINGERSTICK - Normal   POCT GLUCOSE FINGERSTICK - Normal   POCT GLUCOSE FINGERSTICK - Normal   COVID PRE-OP / PRE-PROCEDURE SCREENING ORDER (NO ISOLATION)    Narrative:     The following orders were created for panel order COVID PRE-OP / PRE-PROCEDURE SCREENING ORDER (NO ISOLATION) - Swab, Nasal Cavity.  Procedure                               Abnormality         Status                     ---------                               -----------         ------                     COVID-19,[facility]...[[id number]]  Normal              Final result                 Please view results for these tests on the individual orders.   RAINBOW DRAW    Narrative:     The following orders were created for panel order [place] Draw.  Procedure                               Abnormality         Status                     ---------                               -----------         ------                     Green Top (Gel)[[id number]]                                  Final result               Lavender Top[[id number]]                                     Final result               Red Top[[id number]]                                          Final result                  Please view results for these tests on the individual orders.   BLOOD GAS, ARTERIAL   CALCIUM, IONIZED   BLOOD GAS, ARTERIAL   CALCIUM, IONIZED   BLOOD GAS, ARTERIAL   BLOOD GAS, ARTERIAL   POCT GLUCOSE FINGERSTICK   POCT GLUCOSE FINGERSTICK   POCT GLUCOSE FINGERSTICK   POCT GLUCOSE FINGERSTICK   POCT GLUCOSE FINGERSTICK   POCT GLUCOSE FINGERSTICK   POCT GLUCOSE FINGERSTICK   POCT GLUCOSE FINGERSTICK   POCT GLUCOSE FINGERSTICK   POCT GLUCOSE FINGERSTICK   POCT GLUCOSE FINGERSTICK   POCT GLUCOSE FINGERSTICK   POCT GLUCOSE FINGERSTICK   POCT GLUCOSE FINGERSTICK   POCT GLUCOSE FINGERSTICK   POCT GLUCOSE FINGERSTICK   POCT GLUCOSE FINGERSTICK   POCT GLUCOSE FINGERSTICK   POCT GLUCOSE FINGERSTICK   POCT GLUCOSE FINGERSTICK   POCT GLUCOSE FINGERSTICK   POCT GLUCOSE FINGERSTICK   POCT GLUCOSE FINGERSTICK   POCT GLUCOSE FINGERSTICK   POCT GLUCOSE FINGERSTICK   POCT GLUCOSE FINGERSTICK   POCT GLUCOSE FINGERSTICK   POCT GLUCOSE FINGERSTICK   POCT GLUCOSE FINGERSTICK   POCT GLUCOSE FINGERSTICK   POCT GLUCOSE FINGERSTICK   POCT GLUCOSE FINGERSTICK   POCT GLUCOSE FINGERSTICK   POCT GLUCOSE FINGERSTICK   POCT GLUCOSE FINGERSTICK   POCT GLUCOSE FINGERSTICK   POCT GLUCOSE FINGERSTICK   POCT GLUCOSE FINGERSTICK   POCT GLUCOSE FINGERSTICK   POCT GLUCOSE FINGERSTICK   POCT GLUCOSE FINGERSTICK   POCT GLUCOSE FINGERSTICK   POCT GLUCOSE FINGERSTICK   POCT GLUCOSE FINGERSTICK   POCT GLUCOSE FINGERSTICK   POCT GLUCOSE FINGERSTICK   POCT GLUCOSE FINGERSTICK   POCT GLUCOSE FINGERSTICK   TYPE AND SCREEN   PREPARE RBC   PREPARE PLATELET PHERESIS   PREPARE RBC   PREPARE FRESH FROZEN PLASMA   PREPARE RBC   CBC AND DIFFERENTIAL    Narrative:     The following orders were created for panel order CBC & Differential.  Procedure                               Abnormality         Status                     ---------                               -----------         ------                     CBC Auto Differential[[id number]]        Abnormal             Final result                 Please view results for these tests on the individual orders.   GREEN TOP   LAVENDER TOP   RED TOP   CBC AND DIFFERENTIAL    Narrative:     The following orders were created for panel order CBC & Differential.  Procedure                               Abnormality         Status                     ---------                               -----------         ------                     CBC Auto Differential[[id number]]        Abnormal            Final result                 Please view results for these tests on the individual orders.             MDM  Number of Diagnoses or Management Options  ST elevation myocardial infarction (STEMI), unspecified artery (CMS/HCC): new and requires workup     Amount and/or Complexity of Data Reviewed  Clinical lab tests: ordered and reviewed  Tests in the radiology section of CPT®: ordered and reviewed  Tests in the medicine section of CPT®: ordered and reviewed  Discuss the patient with other providers: yes    Patient Progress  Patient progress: stable      Final diagnoses:   ST elevation myocardial infarction (STEMI), unspecified artery (CMS/HCC)       ED Disposition  ED Disposition     ED Disposition Condition Comment    Send to Cath Lab            Viry Piek, APRARTURO  [address]  [facility] [address]  [phone]    Follow up in 2 week(s)   8/25/2021 3:30pm    Logan Bryant MD  [address]  [place] [address]  [phone]    Follow up in 1 week(s)   8/20/2021 11:20am    Alessio Chung MD  [address]  [address]  [place] [address]  [phone]    Follow up in 6 week(s)   9/24/2021 8:45am         Medication List      New Prescriptions    atorvastatin 40 MG tablet  Commonly known as: LIPITOR  Take 1 tablet by mouth Every Night.  Notes to patient: Next Dose: 8/13/2021 8pm     clopidogrel 75 MG tablet  Commonly known as: PLAVIX  Take 1 tablet by mouth Daily.  Notes to patient: Next Dose: 8/13/2021 5pm     furosemide 40 MG  tablet  Commonly known as: Lasix  Take 1 tablet by mouth Daily for 7 days.  Notes to patient: Next Dose: 8/14/2021 8am     metoprolol tartrate 25 MG tablet  Commonly known as: LOPRESSOR  Take 1 tablet by mouth 2 (Two) Times a Day.  Notes to patient: Next Dose: 8/13/2021 8pm     potassium chloride 10 MEQ CR capsule  Commonly known as: MICRO-K  Take 2 capsules by mouth Daily for 7 days.  Notes to patient: Next Dose: 8/14/2021 8am        Changed    ramipril 1.25 MG capsule  Commonly known as: ALTACE  Take 1 capsule by mouth Daily.  What changed:   · medication strength  · how much to take        Stop    carvedilol 3.125 MG tablet  Commonly known as: COREG        ASK your doctor about these medications    HYDROcodone-acetaminophen 5-325 MG per tablet  Commonly known as: NORCO  Take 1 tablet by mouth Every 6 (Six) Hours As Needed (pain) for up to 5 days.  Ask about: Should I take this medication?           Where to Get Your Medications      These medications were sent to [facility] [address] [place] [address] [place] - [address] [facility] - [phone]  - [phone]   [address] [facility] [address]    Phone: [phone]   · atorvastatin 40 MG tablet  · clopidogrel 75 MG tablet  · furosemide 40 MG tablet  · HYDROcodone-acetaminophen 5-325 MG per tablet  · metoprolol tartrate 25 MG tablet  · potassium chloride 10 MEQ CR capsule  · ramipril 1.25 MG capsule          Dilia House APRN  08/10/21 1021       Rodriguez Jessica MD  08/20/21 5060

## 2021-08-10 NOTE — ANESTHESIA PROCEDURE NOTES
Central Line      Patient reassessed immediately prior to procedure    Patient location during procedure: OR  Start time: 8/10/2021 1:37 PM  Stop Time:8/10/2021 1:43 PM  Indications: vascular access, MD/Surgeon request and central pressure monitoring  Staff  Anesthesiologist: Ellie Darnell MD  Preanesthetic Checklist  Completed: patient identified, IV checked, site marked, risks and benefits discussed, surgical consent, monitors and equipment checked, pre-op evaluation and timeout performed  Central Line Prep  Sterile Tech:cap, gloves, gown, mask and sterile barriers  Prep: chloraprep  Patient monitoring: blood pressure monitoring, continuous pulse oximetry and EKG  Central Line Procedure  Laterality:right  Location:internal jugular  Catheter Type:MAC and Long Bottom-Bob  Catheter Size:9 Fr  Guidance:ultrasound guided and wire visualized in collapsible vessel prior to dilation  PROCEDURE NOTE/ULTRASOUND INTERPRETATION.  Using ultrasound guidance the potential vascular sites for insertion of the catheter were visualized to determine the patency of the vessel to be used for vascular access.  After selecting the appropriate site for insertion, the needle was visualized under ultrasound being inserted into the internal jugular vein, followed by ultrasound confirmation of wire and catheter placement. There were no abnormalities seen on ultrasound; an image was taken; and the patient tolerated the procedure with no complications.   Assessment  Post procedure:biopatch applied, line sutured and occlusive dressing applied  Assessement:blood return through all ports, chest x-ray ordered and free fluid flow  Complications:no  Patient Tolerance:patient tolerated the procedure well with no apparent complications  Additional Notes  Long Bottom secured at 56 cm

## 2021-08-10 NOTE — ANESTHESIA PREPROCEDURE EVALUATION
Anesthesia Evaluation     Patient summary reviewed and Nursing notes reviewed   NPO Solid Status: > 8 hours  NPO Liquid Status: > 8 hours           Airway   Mallampati: II  Dental - normal exam         Pulmonary - normal exam   (-) not a smoker  Cardiovascular   Exercise tolerance: poor (<4 METS)    ECG reviewed  Rhythm: regular  Rate: normal    (+) hypertension, past MI  1-7 days, CAD, hyperlipidemia,       Neuro/Psych  GI/Hepatic/Renal/Endo    (+)  GERD,    (-) diabetes    Musculoskeletal     Abdominal  - normal exam   Substance History      OB/GYN          Other                      Anesthesia Plan    ASA 4 - emergent     general     intravenous induction     Anesthetic plan, all risks, benefits, and alternatives have been provided, discussed and informed consent has been obtained with: patient.

## 2021-08-10 NOTE — H&P
LOS: 0 days   Patient Care Team:  Logan Bryant MD as PCP - General  Logan Bryant MD as PCP - Family Medicine  Lv Ferrera MD as Referring Physician (Orthopedic Surgery)  Logan Jj MD as Cardiologist (Cardiology)    Chief Complaint: Precordial chest pain     Subjective    Logan Mayen is a 60 y.o. male who is being seen in the emergency room    Patient presented with off-and-on chest pain for the last 3 days  Chest pain persist  No presyncope  No syncope  Associated mild nausea  Has had some diaphoresis  EKG shows acute inferior posterior ST elevation myocardial infarction  Chest pain persist  Moderate in severity  Hemodynamically stable  No bleeding issues  No recent Covid-like symptoms  Compliant with prescribed medication regimen  Takes aspirin daily       Telemetry: no malignant arrhythmia. No significant pauses.    Review of Systems   Constitutional: No chills   Has fatigue   No fever.   HENT: Negative.    Eyes: Negative.    Respiratory: Negative for cough,   No chest wall soreness,   Shortness of breath,   no wheezing, no stridor.    Cardiovascular: As above  Gastrointestinal: Negative for abdominal distention,  No abdominal pain,   No blood in stool,   No constipation,   No diarrhea,   No nausea   No vomiting.   Endocrine: Negative.    Genitourinary: Negative for difficulty urinating, dysuria, flank pain and hematuria.   Musculoskeletal: Negative.    Skin: Negative for rash and wound.   Allergic/Immunologic: Negative.    Neurological: Negative for dizziness, syncope, weakness,   No light-headedness  No  headaches.   Hematological: Does not bruise/bleed easily.   Psychiatric/Behavioral: Negative for agitation or behavioral problems,   No confusion,   the patient is  nervous/anxious.       History:   Past Medical History:   Diagnosis Date   • Coronary artery disease    • Hyperlipidemia    • Hypertension    • Myocardial infarction (CMS/HCC)      Past Surgical History:    Procedure Laterality Date   • CARDIAC CATHETERIZATION     • CATARACT EXTRACTION, BILATERAL     • CORONARY STENT PLACEMENT     • PERIPHERAL ARTERIAL STENT GRAFT       Social History     Socioeconomic History   • Marital status:      Spouse name: Not on file   • Number of children: Not on file   • Years of education: Not on file   • Highest education level: Not on file   Tobacco Use   • Smoking status: Never Smoker   • Smokeless tobacco: Never Used   Substance and Sexual Activity   • Alcohol use: Yes     Alcohol/week: 6.0 standard drinks     Types: 6 Cans of beer per week     Comment: 2-3 x a week   • Drug use: Yes     Types: Marijuana     Comment: sometimes     Family History   Problem Relation Age of Onset   • Lung disease Mother    • Heart attack Father    • Heart disease Father    • Lung disease Sister    • Rheum arthritis Sister    • Cancer Sister    • Rheum arthritis Sister    • Liver disease Sister        Labs:  WBC WBC   Date Value Ref Range Status   08/10/2021 6.86 3.40 - 10.80 10*3/mm3 Final      HGB Hemoglobin   Date Value Ref Range Status   08/10/2021 15.2 13.0 - 17.7 g/dL Final      HCT Hematocrit   Date Value Ref Range Status   08/10/2021 44.6 37.5 - 51.0 % Final      Platelets Platelets   Date Value Ref Range Status   08/10/2021 241 140 - 450 10*3/mm3 Final      MCV MCV   Date Value Ref Range Status   08/10/2021 94.1 79.0 - 97.0 fL Final        Results from last 7 days   Lab Units 08/10/21  0910   SODIUM mmol/L 137   POTASSIUM mmol/L 4.1   CHLORIDE mmol/L 103   CO2 mmol/L 26.0   BUN mg/dL 15   CREATININE mg/dL 0.77   CALCIUM mg/dL 9.5   BILIRUBIN mg/dL 0.4   ALK PHOS U/L 99   ALT (SGPT) U/L 23   AST (SGOT) U/L 68*   GLUCOSE mg/dL 114*     Lab Results   Component Value Date    TROPONINT 0.333 (C) 08/10/2021     PT/INR:    Protime   Date Value Ref Range Status   08/10/2021 13.3 11.5 - 13.4 Seconds Final   /  INR   Date Value Ref Range Status   08/10/2021 1.09 0.91 - 1.09 Final       Imaging  Results (Last 72 Hours)     Procedure Component Value Units Date/Time    XR Chest 1 View [[id number]] Collected: 08/10/21 1004     Updated: 08/10/21 1007    Narrative:      EXAM: XR CHEST 1 VW-     INDICATION: Chest Pain Triage Protocol     COMPARISON: None available.     FINDINGS:     Cardiac silhouette is normal in size. No pleural effusion or  pneumothorax. No focal airspace opacity. No acute osseous findings.       Impression:         No acute findings.  This report was finalized on 08/10/2021 10:04 by Dr. Ulysses Liz MD.          Objective     No Known Allergies    Medication Review: Performed  Current Facility-Administered Medications   Medication Dose Route Frequency Provider Last Rate Last Admin   • bivalirudin (ANGIOMAX) 250 mg in 50 mL NS infusion    Continuous PRAlessio Hall .4 mL/hr at 08/10/21 1059 28.4 mg/kg/hr at 08/10/21 1059   • bivalirudin (ANGIOMAX) bolus from bag    PRAlessio Hall MD   12 mL at 08/10/21 1055   • diphenhydrAMINE (BENADRYL) injection    PRAlessio Hall MD   50 mg at 08/10/21 1048   • eptifibatide (INTEGRILIN) 75 mg in 100 mL solution    Continuous PRAlessio Hall MD 13 mL/hr at 08/10/21 1116 2.014 mcg/kg/min at 08/10/21 1116   • eptifibatide (INTEGRILIN) bolus from bottle    PRAlessio Hall MD   14,526 mcg at 08/10/21 1115   • fentaNYL citrate (PF) (SUBLIMAZE) injection    PRAlessio Hall MD   50 mcg at 08/10/21 1048   • heparin infusion 2 units/mL in 0.9% NaCl    PRAlessio Hall MD   1,000 mL at 08/10/21 1043   • heparin infusion 2 units/mL in 0.9% NaCl    Alessio Dewey MD   500 mL at 08/10/21 1043   • iopamidol (ISOVUE-370) 76 % injection    PRAlessio Hall MD   370 mL at 08/10/21 1118   • Midazolam HCl (PF) (VERSED) injection    PRAlessio Hall MD   1 mg at 08/10/21 1048   • [MAR Hold] nitroglycerin (NITROSTAT) SL tablet 0.4 mg  0.4 mg Sublingual Q5 Min PRN Dilia House APRN   0.4 mg at 08/10/21 1005   • [MAR Hold] sodium chloride 0.9 %  "flush 10 mL  10 mL Intravenous PRN Rodriguez Jessica MD       • [MAR Hold] sodium chloride 0.9 % flush 10 mL  10 mL Intravenous PRN Dilia House, NEERU           Vital Sign Min/Max for last 24 hours  Temp  Min: 97.9 °F (36.6 °C)  Max: 97.9 °F (36.6 °C)   BP  Min: 113/79  Max: 140/87   Pulse  Min: 52  Max: 61   Resp  Min: 16  Max: 16   SpO2  Min: 97 %  Max: 100 %   No data recorded   Weight  Min: 80.7 kg (178 lb)  Max: 80.7 kg (178 lb)     Flowsheet Rows      First Filed Value   Admission Height  170.2 cm (67\") Documented at 08/10/2021 0900   Admission Weight  80.7 kg (178 lb) Documented at 08/10/2021 0900          Results for orders placed during the hospital encounter of 03/21/19    Adult Stress Echo W/ Cont or Stress Agent if Necessary Per Protocol    Interpretation Summary  · Low risk for ischemia      Physical Exam:    General Appearance: Awake, alert, in no acute distress  Eyes: Pupils equal and reactive    Ears: Appear intact with no abnormalities noted  Nose: Nares normal, no drainage  Neck: supple, trachea midline, no carotid bruit and no JVD  Back: no kyphosis present,    Lungs: respirations regular, respirations even and respirations unlabored  Heart: normal S1, S2, no significant murmurs   No gallops or rubs  no rub and no click  Abdomen: normal bowel sounds, no tenderness   Skin: no bleeding, bruising or rash  Extremities: no cyanosis  Psychiatric/Behavioral: Negative for agitation, behavioral problems, confusion, the patient does  appear to be nervous/anxious.       Results Review:   I reviewed the patient's new clinical results.  I reviewed the patient's new imaging results and agree with the interpretation.  I reviewed the patient's other test results and agree with the interpretation  I personally viewed and interpreted the patient's EKG/Telemetry data    Discussed with patient  Updated patient regarding any new or relevant abnormalities on review of records or any new findings on physical exam. "   Mentioned to patient about purpose of visit and desirable health short and long term goals and objectives.     Reviewed available prior notes, consults, prior visits, laboratory findings, radiology and cardiology relevant reports.   Updated chart as applicable.   I have reviewed the patient's medical history in detail and updated the computerized patient record as relevant.          Assessment/Plan     Acute inferior posterior STEMI  Coronary artery disease  Prior multiple coronary stent placement    Plan      Recommend emergent cardiac catheterization, selective coronary angiography, left ventriculography and percutaneous coronary intervention with application of arteriotomy hemostatic closure device.    I discussed cardiac catheterization, the procedure, risks (including bleeding, infection, vascular damage [including minor oozing, bruising, bleeding, and up to and including but not limited to the need for vascular surgery, emergency cardiothoracic surgery, contrast reaction, renal failure, respiratory failure, heart attack, stroke, arrhythmia and even death), benefits, and alternatives and the patient has voiced understanding and is willing to proceed.    Adequate pre-hydration and post cardiac catheterization hydration.  Premedications as required and indicated for cardiac catheterization.    No contraindication to drug eluting stent placement if required  Further recommendations pending results of cardiac catheterization        Alessio Chung MD  08/10/21  11:26 CDT    EMR Dragon/Transcription was used to dictate part of this note

## 2021-08-11 ENCOUNTER — APPOINTMENT (OUTPATIENT)
Dept: GENERAL RADIOLOGY | Facility: HOSPITAL | Age: 61
End: 2021-08-11

## 2021-08-11 PROBLEM — F12.90 MARIJUANA USE: Status: ACTIVE | Noted: 2021-08-11

## 2021-08-11 PROBLEM — I21.3 ST ELEVATION MYOCARDIAL INFARCTION (STEMI): Status: RESOLVED | Noted: 2021-08-10 | Resolved: 2021-08-11

## 2021-08-11 PROBLEM — E66.3 OVERWEIGHT (BMI 25.0-29.9): Status: ACTIVE | Noted: 2021-08-11

## 2021-08-11 LAB
ALBUMIN SERPL-MCNC: 3.8 G/DL (ref 3.5–5.2)
ALBUMIN SERPL-MCNC: 4 G/DL (ref 3.5–5.2)
ANION GAP SERPL CALCULATED.3IONS-SCNC: 9 MMOL/L (ref 5–15)
ANION GAP SERPL CALCULATED.3IONS-SCNC: 9 MMOL/L (ref 5–15)
ARTERIAL PATENCY WRIST A: ABNORMAL
ARTERIAL PATENCY WRIST A: ABNORMAL
ATMOSPHERIC PRESS: 750 MMHG
ATMOSPHERIC PRESS: 751 MMHG
BASE EXCESS BLDA CALC-SCNC: 2.1 MMOL/L (ref 0–2)
BASE EXCESS BLDA CALC-SCNC: 2.3 MMOL/L (ref 0–2)
BDY SITE: ABNORMAL
BDY SITE: ABNORMAL
BH CV ECHO MEAS - AO MAX PG (FULL): 2 MMHG
BH CV ECHO MEAS - AO MAX PG: 5.7 MMHG
BH CV ECHO MEAS - AO MEAN PG (FULL): 1 MMHG
BH CV ECHO MEAS - AO MEAN PG: 3 MMHG
BH CV ECHO MEAS - AO ROOT AREA (BSA CORRECTED): 1.8
BH CV ECHO MEAS - AO ROOT AREA: 9.1 CM^2
BH CV ECHO MEAS - AO ROOT DIAM: 3.4 CM
BH CV ECHO MEAS - AO V2 MAX: 119 CM/SEC
BH CV ECHO MEAS - AO V2 MEAN: 79.6 CM/SEC
BH CV ECHO MEAS - AO V2 VTI: 24.3 CM
BH CV ECHO MEAS - AVA(I,A): 2.8 CM^2
BH CV ECHO MEAS - AVA(I,D): 2.8 CM^2
BH CV ECHO MEAS - AVA(V,A): 2.8 CM^2
BH CV ECHO MEAS - AVA(V,D): 2.8 CM^2
BH CV ECHO MEAS - BSA(HAYCOCK): 2 M^2
BH CV ECHO MEAS - BSA: 1.9 M^2
BH CV ECHO MEAS - BZI_BMI: 28.2 KILOGRAMS/M^2
BH CV ECHO MEAS - BZI_METRIC_HEIGHT: 170.2 CM
BH CV ECHO MEAS - BZI_METRIC_WEIGHT: 81.6 KG
BH CV ECHO MEAS - EDV(CUBED): 92.3 ML
BH CV ECHO MEAS - EDV(MOD-SP4): 96.1 ML
BH CV ECHO MEAS - EDV(TEICH): 93.4 ML
BH CV ECHO MEAS - EF(CUBED): 51.6 %
BH CV ECHO MEAS - EF(MOD-SP4): 39.9 %
BH CV ECHO MEAS - EF(TEICH): 43.7 %
BH CV ECHO MEAS - ESV(CUBED): 44.7 ML
BH CV ECHO MEAS - ESV(MOD-SP4): 57.8 ML
BH CV ECHO MEAS - ESV(TEICH): 52.6 ML
BH CV ECHO MEAS - FS: 21.5 %
BH CV ECHO MEAS - IVS/LVPW: 0.94
BH CV ECHO MEAS - IVSD: 1 CM
BH CV ECHO MEAS - LA DIMENSION: 2.5 CM
BH CV ECHO MEAS - LA/AO: 0.74
BH CV ECHO MEAS - LAT PEAK E' VEL: 11.3 CM/SEC
BH CV ECHO MEAS - LV DIASTOLIC VOL/BSA (35-75): 49.7 ML/M^2
BH CV ECHO MEAS - LV MASS(C)D: 170.7 GRAMS
BH CV ECHO MEAS - LV MASS(C)DI: 88.3 GRAMS/M^2
BH CV ECHO MEAS - LV MAX PG: 3.6 MMHG
BH CV ECHO MEAS - LV MEAN PG: 2 MMHG
BH CV ECHO MEAS - LV SYSTOLIC VOL/BSA (12-30): 29.9 ML/M^2
BH CV ECHO MEAS - LV V1 MAX: 95.3 CM/SEC
BH CV ECHO MEAS - LV V1 MEAN: 59.7 CM/SEC
BH CV ECHO MEAS - LV V1 VTI: 19.7 CM
BH CV ECHO MEAS - LVIDD: 4.5 CM
BH CV ECHO MEAS - LVIDS: 3.6 CM
BH CV ECHO MEAS - LVLD AP4: 7.9 CM
BH CV ECHO MEAS - LVLS AP4: 7.2 CM
BH CV ECHO MEAS - LVOT AREA (M): 3.5 CM^2
BH CV ECHO MEAS - LVOT AREA: 3.5 CM^2
BH CV ECHO MEAS - LVOT DIAM: 2.1 CM
BH CV ECHO MEAS - LVPWD: 1.1 CM
BH CV ECHO MEAS - MED PEAK E' VEL: 5 CM/SEC
BH CV ECHO MEAS - MR MAX PG: 56 MMHG
BH CV ECHO MEAS - MR MAX VEL: 374 CM/SEC
BH CV ECHO MEAS - MV A MAX VEL: 74.7 CM/SEC
BH CV ECHO MEAS - MV DEC TIME: 0.23 SEC
BH CV ECHO MEAS - MV E MAX VEL: 73.7 CM/SEC
BH CV ECHO MEAS - MV E/A: 0.99
BH CV ECHO MEAS - RAP SYSTOLE: 5 MMHG
BH CV ECHO MEAS - RVSP: 27.7 MMHG
BH CV ECHO MEAS - SI(AO): 114.1 ML/M^2
BH CV ECHO MEAS - SI(CUBED): 24.6 ML/M^2
BH CV ECHO MEAS - SI(LVOT): 35.3 ML/M^2
BH CV ECHO MEAS - SI(MOD-SP4): 19.8 ML/M^2
BH CV ECHO MEAS - SI(TEICH): 21.1 ML/M^2
BH CV ECHO MEAS - SV(AO): 220.6 ML
BH CV ECHO MEAS - SV(CUBED): 47.6 ML
BH CV ECHO MEAS - SV(LVOT): 68.2 ML
BH CV ECHO MEAS - SV(MOD-SP4): 38.3 ML
BH CV ECHO MEAS - SV(TEICH): 40.8 ML
BH CV ECHO MEAS - TR MAX VEL: 238 CM/SEC
BH CV ECHO MEASUREMENTS AVERAGE E/E' RATIO: 9.04
BODY TEMPERATURE: 37 C
BODY TEMPERATURE: 37 C
BUN SERPL-MCNC: 12 MG/DL (ref 6–20)
BUN SERPL-MCNC: 13 MG/DL (ref 6–20)
BUN/CREAT SERPL: 14.4 (ref 7–25)
BUN/CREAT SERPL: 14.8 (ref 7–25)
CALCIUM SPEC-SCNC: 8.5 MG/DL (ref 8.6–10.5)
CALCIUM SPEC-SCNC: 8.6 MG/DL (ref 8.6–10.5)
CHLORIDE SERPL-SCNC: 108 MMOL/L (ref 98–107)
CHLORIDE SERPL-SCNC: 109 MMOL/L (ref 98–107)
CO2 SERPL-SCNC: 25 MMOL/L (ref 22–29)
CO2 SERPL-SCNC: 25 MMOL/L (ref 22–29)
CREAT SERPL-MCNC: 0.81 MG/DL (ref 0.76–1.27)
CREAT SERPL-MCNC: 0.9 MG/DL (ref 0.76–1.27)
DEPRECATED RDW RBC AUTO: 40.7 FL (ref 37–54)
DEPRECATED RDW RBC AUTO: 41 FL (ref 37–54)
ERYTHROCYTE [DISTWIDTH] IN BLOOD BY AUTOMATED COUNT: 11.9 % (ref 12.3–15.4)
ERYTHROCYTE [DISTWIDTH] IN BLOOD BY AUTOMATED COUNT: 12 % (ref 12.3–15.4)
GFR SERPL CREATININE-BSD FRML MDRD: 86 ML/MIN/1.73
GFR SERPL CREATININE-BSD FRML MDRD: 98 ML/MIN/1.73
GLUCOSE BLDC GLUCOMTR-MCNC: 106 MG/DL (ref 70–130)
GLUCOSE BLDC GLUCOMTR-MCNC: 113 MG/DL (ref 70–130)
GLUCOSE BLDC GLUCOMTR-MCNC: 114 MG/DL (ref 70–130)
GLUCOSE BLDC GLUCOMTR-MCNC: 116 MG/DL (ref 70–130)
GLUCOSE BLDC GLUCOMTR-MCNC: 122 MG/DL (ref 70–130)
GLUCOSE BLDC GLUCOMTR-MCNC: 123 MG/DL (ref 70–130)
GLUCOSE BLDC GLUCOMTR-MCNC: 125 MG/DL (ref 70–130)
GLUCOSE BLDC GLUCOMTR-MCNC: 127 MG/DL (ref 70–130)
GLUCOSE BLDC GLUCOMTR-MCNC: 128 MG/DL (ref 70–130)
GLUCOSE BLDC GLUCOMTR-MCNC: 131 MG/DL (ref 70–130)
GLUCOSE BLDC GLUCOMTR-MCNC: 146 MG/DL (ref 70–130)
GLUCOSE BLDC GLUCOMTR-MCNC: 154 MG/DL (ref 70–130)
GLUCOSE BLDC GLUCOMTR-MCNC: 166 MG/DL (ref 70–130)
GLUCOSE BLDC GLUCOMTR-MCNC: 173 MG/DL (ref 70–130)
GLUCOSE BLDC GLUCOMTR-MCNC: 95 MG/DL (ref 70–130)
GLUCOSE SERPL-MCNC: 143 MG/DL (ref 65–99)
GLUCOSE SERPL-MCNC: 179 MG/DL (ref 65–99)
HCO3 BLDA-SCNC: 26.9 MMOL/L (ref 20–26)
HCO3 BLDA-SCNC: 27.6 MMOL/L (ref 20–26)
HCT VFR BLD AUTO: 24.1 % (ref 37.5–51)
HCT VFR BLD AUTO: 25.2 % (ref 37.5–51)
HGB BLD-MCNC: 8.4 G/DL (ref 13–17.7)
HGB BLD-MCNC: 8.8 G/DL (ref 13–17.7)
INHALED O2 CONCENTRATION: 30 %
INHALED O2 CONCENTRATION: 30 %
INR PPP: 1.28 (ref 0.91–1.09)
LEFT ATRIUM VOLUME INDEX: 26.1 ML/M2
LEFT ATRIUM VOLUME: 50.3 CM3
Lab: ABNORMAL
Lab: ABNORMAL
MAGNESIUM SERPL-MCNC: 2.1 MG/DL (ref 1.6–2.6)
MCH RBC QN AUTO: 32.7 PG (ref 26.6–33)
MCH RBC QN AUTO: 32.7 PG (ref 26.6–33)
MCHC RBC AUTO-ENTMCNC: 34.9 G/DL (ref 31.5–35.7)
MCHC RBC AUTO-ENTMCNC: 34.9 G/DL (ref 31.5–35.7)
MCV RBC AUTO: 93.7 FL (ref 79–97)
MCV RBC AUTO: 93.8 FL (ref 79–97)
MODALITY: ABNORMAL
MODALITY: ABNORMAL
PCO2 BLDA: 41.8 MM HG (ref 35–45)
PCO2 BLDA: 45 MM HG (ref 35–45)
PCO2 TEMP ADJ BLD: 41.8 MM HG (ref 35–45)
PCO2 TEMP ADJ BLD: 45 MM HG (ref 35–45)
PEEP RESPIRATORY: 5 CM[H2O]
PEEP RESPIRATORY: 5 CM[H2O]
PH BLDA: 7.39 PH UNITS (ref 7.35–7.45)
PH BLDA: 7.42 PH UNITS (ref 7.35–7.45)
PH, TEMP CORRECTED: 7.39 PH UNITS (ref 7.35–7.45)
PH, TEMP CORRECTED: 7.42 PH UNITS (ref 7.35–7.45)
PHOSPHATE SERPL-MCNC: 2.7 MG/DL (ref 2.5–4.5)
PHOSPHATE SERPL-MCNC: 3.2 MG/DL (ref 2.5–4.5)
PLATELET # BLD AUTO: 120 10*3/MM3 (ref 140–450)
PLATELET # BLD AUTO: 135 10*3/MM3 (ref 140–450)
PMV BLD AUTO: 9.4 FL (ref 6–12)
PMV BLD AUTO: 9.5 FL (ref 6–12)
PO2 BLDA: 75 MM HG (ref 83–108)
PO2 BLDA: 85.1 MM HG (ref 83–108)
PO2 TEMP ADJ BLD: 75 MM HG (ref 83–108)
PO2 TEMP ADJ BLD: 85.1 MM HG (ref 83–108)
POTASSIUM SERPL-SCNC: 4.4 MMOL/L (ref 3.5–5.2)
POTASSIUM SERPL-SCNC: 5 MMOL/L (ref 3.5–5.2)
PROTHROMBIN TIME: 15 SECONDS (ref 11.5–13.4)
PSV: 10 CMH2O
PSV: 10 CMH2O
QT INTERVAL: 406 MS
QT INTERVAL: 438 MS
QT INTERVAL: 442 MS
QT INTERVAL: 456 MS
QT INTERVAL: 470 MS
QTC INTERVAL: 419 MS
QTC INTERVAL: 443 MS
QTC INTERVAL: 456 MS
QTC INTERVAL: 465 MS
QTC INTERVAL: 517 MS
RBC # BLD AUTO: 2.57 10*6/MM3 (ref 4.14–5.8)
RBC # BLD AUTO: 2.69 10*6/MM3 (ref 4.14–5.8)
SAO2 % BLDCOA: 96.1 % (ref 94–99)
SAO2 % BLDCOA: 97.7 % (ref 94–99)
SET MECH RESP RATE: 18
SODIUM SERPL-SCNC: 142 MMOL/L (ref 136–145)
SODIUM SERPL-SCNC: 143 MMOL/L (ref 136–145)
VENTILATOR MODE: ABNORMAL
VENTILATOR MODE: ABNORMAL
VT ON VENT VENT: 500 ML
WBC # BLD AUTO: 6.95 10*3/MM3 (ref 3.4–10.8)
WBC # BLD AUTO: 8.32 10*3/MM3 (ref 3.4–10.8)

## 2021-08-11 PROCEDURE — 97116 GAIT TRAINING THERAPY: CPT

## 2021-08-11 PROCEDURE — 97162 PT EVAL MOD COMPLEX 30 MIN: CPT

## 2021-08-11 PROCEDURE — 99024 POSTOP FOLLOW-UP VISIT: CPT | Performed by: THORACIC SURGERY (CARDIOTHORACIC VASCULAR SURGERY)

## 2021-08-11 PROCEDURE — 82803 BLOOD GASES ANY COMBINATION: CPT

## 2021-08-11 PROCEDURE — 93005 ELECTROCARDIOGRAM TRACING: CPT | Performed by: THORACIC SURGERY (CARDIOTHORACIC VASCULAR SURGERY)

## 2021-08-11 PROCEDURE — 82962 GLUCOSE BLOOD TEST: CPT

## 2021-08-11 PROCEDURE — 94799 UNLISTED PULMONARY SVC/PX: CPT

## 2021-08-11 PROCEDURE — 25010000002 PHENYLEPHRINE 10 MG/ML SOLUTION 5 ML VIAL: Performed by: THORACIC SURGERY (CARDIOTHORACIC VASCULAR SURGERY)

## 2021-08-11 PROCEDURE — 25010000002 CEFAZOLIN PER 500 MG: Performed by: THORACIC SURGERY (CARDIOTHORACIC VASCULAR SURGERY)

## 2021-08-11 PROCEDURE — 25010000002 VANCOMYCIN 10 G RECONSTITUTED SOLUTION: Performed by: THORACIC SURGERY (CARDIOTHORACIC VASCULAR SURGERY)

## 2021-08-11 PROCEDURE — 71045 X-RAY EXAM CHEST 1 VIEW: CPT

## 2021-08-11 PROCEDURE — 0 INSULIN REGULAR HUMAN PER 5 UNITS: Performed by: THORACIC SURGERY (CARDIOTHORACIC VASCULAR SURGERY)

## 2021-08-11 PROCEDURE — 25010000003 INSULIN REGULAR HUMAN PER 5 UNITS: Performed by: THORACIC SURGERY (CARDIOTHORACIC VASCULAR SURGERY)

## 2021-08-11 PROCEDURE — 25010000003 MEPERIDINE PER 100 MG: Performed by: THORACIC SURGERY (CARDIOTHORACIC VASCULAR SURGERY)

## 2021-08-11 PROCEDURE — 85027 COMPLETE CBC AUTOMATED: CPT | Performed by: THORACIC SURGERY (CARDIOTHORACIC VASCULAR SURGERY)

## 2021-08-11 PROCEDURE — 85610 PROTHROMBIN TIME: CPT | Performed by: THORACIC SURGERY (CARDIOTHORACIC VASCULAR SURGERY)

## 2021-08-11 PROCEDURE — 80069 RENAL FUNCTION PANEL: CPT | Performed by: THORACIC SURGERY (CARDIOTHORACIC VASCULAR SURGERY)

## 2021-08-11 PROCEDURE — 0 MEPERIDINE PER 100 MG: Performed by: THORACIC SURGERY (CARDIOTHORACIC VASCULAR SURGERY)

## 2021-08-11 PROCEDURE — 93010 ELECTROCARDIOGRAM REPORT: CPT | Performed by: INTERNAL MEDICINE

## 2021-08-11 PROCEDURE — 25010000002 ENOXAPARIN PER 10 MG: Performed by: THORACIC SURGERY (CARDIOTHORACIC VASCULAR SURGERY)

## 2021-08-11 PROCEDURE — 83735 ASSAY OF MAGNESIUM: CPT | Performed by: THORACIC SURGERY (CARDIOTHORACIC VASCULAR SURGERY)

## 2021-08-11 RX ORDER — HYDROCODONE BITARTRATE AND ACETAMINOPHEN 10; 325 MG/1; MG/1
1 TABLET ORAL EVERY 4 HOURS PRN
Status: DISCONTINUED | OUTPATIENT
Start: 2021-08-11 | End: 2021-08-13 | Stop reason: SDUPTHER

## 2021-08-11 RX ORDER — LIDOCAINE 50 MG/G
2 PATCH TOPICAL
Status: DISCONTINUED | OUTPATIENT
Start: 2021-08-11 | End: 2021-08-13 | Stop reason: HOSPADM

## 2021-08-11 RX ORDER — POLYETHYLENE GLYCOL 3350 17 G/17G
17 POWDER, FOR SOLUTION ORAL DAILY
Status: DISCONTINUED | OUTPATIENT
Start: 2021-08-11 | End: 2021-08-13 | Stop reason: HOSPADM

## 2021-08-11 RX ORDER — ACETAMINOPHEN 325 MG/1
650 TABLET ORAL EVERY 8 HOURS SCHEDULED
Status: DISCONTINUED | OUTPATIENT
Start: 2021-08-11 | End: 2021-08-13 | Stop reason: HOSPADM

## 2021-08-11 RX ORDER — PANTOPRAZOLE SODIUM 40 MG/1
40 TABLET, DELAYED RELEASE ORAL
Status: DISCONTINUED | OUTPATIENT
Start: 2021-08-11 | End: 2021-08-13 | Stop reason: HOSPADM

## 2021-08-11 RX ORDER — HYDROCODONE BITARTRATE AND ACETAMINOPHEN 5; 325 MG/1; MG/1
1 TABLET ORAL EVERY 4 HOURS PRN
Status: DISCONTINUED | OUTPATIENT
Start: 2021-08-11 | End: 2021-08-13 | Stop reason: HOSPADM

## 2021-08-11 RX ORDER — PREGABALIN 25 MG/1
25 CAPSULE ORAL EVERY 12 HOURS SCHEDULED
Status: DISCONTINUED | OUTPATIENT
Start: 2021-08-11 | End: 2021-08-13 | Stop reason: HOSPADM

## 2021-08-11 RX ORDER — BISACODYL 5 MG/1
10 TABLET, DELAYED RELEASE ORAL 2 TIMES DAILY
Status: DISCONTINUED | OUTPATIENT
Start: 2021-08-11 | End: 2021-08-13 | Stop reason: HOSPADM

## 2021-08-11 RX ORDER — ASPIRIN 81 MG/1
81 TABLET, CHEWABLE ORAL DAILY
COMMUNITY

## 2021-08-11 RX ADMIN — Medication 1 APPLICATION: at 20:47

## 2021-08-11 RX ADMIN — CEFAZOLIN 2 G: 10 INJECTION, POWDER, FOR SOLUTION INTRAVENOUS at 00:04

## 2021-08-11 RX ADMIN — SODIUM CHLORIDE 1 UNITS/HR: 9 INJECTION, SOLUTION INTRAVENOUS at 01:07

## 2021-08-11 RX ADMIN — METOPROLOL TARTRATE 12.5 MG: 25 TABLET, FILM COATED ORAL at 20:47

## 2021-08-11 RX ADMIN — CHLORHEXIDINE GLUCONATE 15 ML: 1.2 SOLUTION ORAL at 08:05

## 2021-08-11 RX ADMIN — PHENYLEPHRINE HYDROCHLORIDE 1.4 MCG/KG/MIN: 10 INJECTION INTRAVENOUS at 13:00

## 2021-08-11 RX ADMIN — OXYCODONE HYDROCHLORIDE AND ACETAMINOPHEN 1 TABLET: 5; 325 TABLET ORAL at 14:50

## 2021-08-11 RX ADMIN — ENOXAPARIN SODIUM 30 MG: 30 INJECTION SUBCUTANEOUS at 08:05

## 2021-08-11 RX ADMIN — PANTOPRAZOLE SODIUM 40 MG: 40 TABLET, DELAYED RELEASE ORAL at 10:38

## 2021-08-11 RX ADMIN — ACETAMINOPHEN 650 MG: 325 TABLET, FILM COATED ORAL at 22:05

## 2021-08-11 RX ADMIN — CLOPIDOGREL 75 MG: 75 TABLET, FILM COATED ORAL at 18:38

## 2021-08-11 RX ADMIN — ENOXAPARIN SODIUM 30 MG: 30 INJECTION SUBCUTANEOUS at 20:48

## 2021-08-11 RX ADMIN — VANCOMYCIN HYDROCHLORIDE 1250 MG: 10 INJECTION, POWDER, LYOPHILIZED, FOR SOLUTION INTRAVENOUS at 14:50

## 2021-08-11 RX ADMIN — IPRATROPIUM BROMIDE AND ALBUTEROL SULFATE 3 ML: 2.5; .5 SOLUTION RESPIRATORY (INHALATION) at 10:12

## 2021-08-11 RX ADMIN — MEPERIDINE HYDROCHLORIDE 25 MG: 50 INJECTION INTRAMUSCULAR; INTRAVENOUS; SUBCUTANEOUS at 01:31

## 2021-08-11 RX ADMIN — ASPIRIN 162 MG: 325 TABLET ORAL at 08:05

## 2021-08-11 RX ADMIN — DEXMEDETOMIDINE HYDROCHLORIDE IN 0.9% SODIUM CHLORIDE 0.7 MCG/KG/HR: 4 INJECTION INTRAVENOUS at 04:07

## 2021-08-11 RX ADMIN — PREGABALIN 25 MG: 25 CAPSULE ORAL at 16:02

## 2021-08-11 RX ADMIN — AMIODARONE HYDROCHLORIDE 400 MG: 200 TABLET ORAL at 18:38

## 2021-08-11 RX ADMIN — Medication 1 APPLICATION: at 08:05

## 2021-08-11 RX ADMIN — IPRATROPIUM BROMIDE AND ALBUTEROL SULFATE 3 ML: 2.5; .5 SOLUTION RESPIRATORY (INHALATION) at 14:40

## 2021-08-11 RX ADMIN — OXYCODONE HYDROCHLORIDE AND ACETAMINOPHEN 1 TABLET: 5; 325 TABLET ORAL at 06:06

## 2021-08-11 RX ADMIN — AMIODARONE HYDROCHLORIDE 400 MG: 200 TABLET ORAL at 08:05

## 2021-08-11 RX ADMIN — IPRATROPIUM BROMIDE AND ALBUTEROL SULFATE 3 ML: 2.5; .5 SOLUTION RESPIRATORY (INHALATION) at 06:40

## 2021-08-11 RX ADMIN — HYDROCODONE BITARTRATE AND ACETAMINOPHEN 1 TABLET: 5; 325 TABLET ORAL at 19:44

## 2021-08-11 RX ADMIN — METOPROLOL TARTRATE 12.5 MG: 25 TABLET, FILM COATED ORAL at 08:05

## 2021-08-11 RX ADMIN — OXYCODONE HYDROCHLORIDE AND ACETAMINOPHEN 1 TABLET: 5; 325 TABLET ORAL at 07:53

## 2021-08-11 RX ADMIN — CEFAZOLIN 2 G: 10 INJECTION, POWDER, FOR SOLUTION INTRAVENOUS at 08:05

## 2021-08-11 RX ADMIN — BISACODYL 10 MG: 5 TABLET ORAL at 20:47

## 2021-08-11 RX ADMIN — POLYETHYLENE GLYCOL 3350 17 G: 17 POWDER, FOR SOLUTION ORAL at 10:44

## 2021-08-11 RX ADMIN — VANCOMYCIN HYDROCHLORIDE 1250 MG: 10 INJECTION, POWDER, LYOPHILIZED, FOR SOLUTION INTRAVENOUS at 02:26

## 2021-08-11 RX ADMIN — SODIUM CHLORIDE, POTASSIUM CHLORIDE, SODIUM LACTATE AND CALCIUM CHLORIDE 1000 ML: 600; 310; 30; 20 INJECTION, SOLUTION INTRAVENOUS at 18:34

## 2021-08-11 RX ADMIN — ACETAMINOPHEN 650 MG: 325 TABLET, FILM COATED ORAL at 16:02

## 2021-08-11 RX ADMIN — IPRATROPIUM BROMIDE AND ALBUTEROL SULFATE 3 ML: 2.5; .5 SOLUTION RESPIRATORY (INHALATION) at 20:46

## 2021-08-11 RX ADMIN — BISACODYL 10 MG: 5 TABLET ORAL at 10:38

## 2021-08-11 RX ADMIN — LIDOCAINE 2 PATCH: 50 PATCH CUTANEOUS at 10:38

## 2021-08-11 RX ADMIN — CEFAZOLIN 2 G: 10 INJECTION, POWDER, FOR SOLUTION INTRAVENOUS at 16:02

## 2021-08-11 RX ADMIN — SODIUM CHLORIDE 1000 ML: 9 INJECTION, SOLUTION INTRAVENOUS at 12:30

## 2021-08-11 NOTE — PLAN OF CARE
Goal Outcome Evaluation:  Plan of Care Reviewed With: patient           Outcome Summary: PT eval complete. He is alert and oriented x 4. He was educated on sternal precautions. He required min assist to stand and ambualte. He ambulates 200 ft around the ICU with 3 brief standign rest breaks due to SOB. PT will cont to progress gait, endurance and strengthening. Risa mast home with s.o..

## 2021-08-11 NOTE — ANESTHESIA POSTPROCEDURE EVALUATION
Patient: Logan Mayen    Procedure Summary     Date: 08/10/21 Room / Location: [facility] OR  /  [facility] OR    Anesthesia Start: 1323 Anesthesia Stop: 1952    Procedure: CORONARY ARTERY BYPASS GRAFT X 4 WITH LEFT INTERNAL MAMMARY ARTERY, RIGHT LOWER EXTREMITY ENDOSCOPIC VEIN HARVEST, BILATERAL LOWER EXTREMITY OPEN VEIN HARVEST, AND PERFUSION; TRANSESOPHAGEAL ECHOCARDIOGRAM (N/A Chest) Diagnosis:     Surgeons: Zana Hernandez MD Provider: Yony Paulino CRNA    Anesthesia Type: Not recorded ASA Status: Not recorded          Anesthesia Type: No value filed.    Vitals  No vitals data found for the desired time range.          Post Anesthesia Care and Evaluation    Patient location during evaluation: ICU  Patient participation: complete - patient cannot participate  Level of consciousness: obtunded/minimal responses  Pain management: adequate  Airway patency: patent  Anesthetic complications: No anesthetic complications  PONV Status: none  Cardiovascular status: acceptable  Respiratory status: acceptable  Hydration status: acceptable

## 2021-08-11 NOTE — PROGRESS NOTES
"Coronary artery bypass grafting -4 vessel, right lower extremity endoscopic vein harvest, bilateral lower extremity open vein harvest    POD 1    Extubated overnight, on 1 liter nasal cannula with O2 st 95%. IS ~ 1000 ml. Recently took pain pill and getting ready to work with physical therapy.     IV gtts: Amiodarone, Nitroglycerin, Insulin, Neosynephrine, IVF   Telemetry: sinus 70s  Hemodynamics: CO 5.9, CI 3, CVP 17, PAP 42/18 mean 17    Visit Vitals  BP 97/62   Pulse 75   Temp 98.2 °F (36.8 °C) (Oral)   Resp 24   Ht 170.2 cm (67.01\")   Wt 86.1 kg (189 lb 13.1 oz)   SpO2 95%   BMI 29.72 kg/m²     Pre operative weight 178 pounds     Intake/Output Summary (Last 24 hours) at 8/11/2021 0911  Last data filed at 8/11/2021 0756  Gross per 24 hour   Intake 3478.46 ml   Output 4008 ml   Net -529.54 ml       Mediastinal tube output: 340 ml/24 hours   Left Marc drain: 98 ml/24 hours     Labs:        Chest x-ray: appropriate support lines in place, no pneumothorax, left basilar atelectasis     Physical Exam:  General: No apparent distress. In good spirits. Resting in bed.   Cardiovascular: Regular rate and rhythm without murmur, rubs, or gallops.    Pulmonary: Clear to auscultation bilaterally without wheezing, rubs, or rales.  Chest: Sternotomy incision clean, dry, and intact. Sternum stable. No clicks. Mediastinal tubes x 2 and Marc drain x 1 with dressing clean, dry, and intact.    Abdomen: Soft, nondistended and nontender.  Extremities: Warm, moves all extremities. Bilateral saphenectomy sites clean, dry, and intact with ace wraps in place. Edematous extremities.   Neurologic: Grossly intact with no focal deficits.       Impression:  STEMI   Coronary artery disease involving native coronary artery of native heart  Previous coronary artery stent placement post myocardial infarction   GERD (gastroesophageal reflux disease)  Hypertension  Hyperlipidemia  Alcohol use    Plan:  Discontinue amiodarone gtt  Wean neosynephrine " gtt per protocol  Bowel regimen  Encourage pulmonary toilet and ambulation   Routine post cardiac surgery regimen

## 2021-08-11 NOTE — THERAPY EVALUATION
Patient Name: Logan Mayen  : 1960    MRN: [id number]                              Today's Date: 2021       Admit Date: 8/10/2021    Visit Dx:     ICD-10-CM ICD-9-CM   1. ST elevation myocardial infarction (STEMI), unspecified artery (CMS/HCC)  I21.3 410.90   2. Myocardial infarction (CMS/HCC)  I21.9 410.90   3. Impaired mobility  Z74.09 799.89     Patient Active Problem List   Diagnosis   • Ileus (CMS/HCC)   • Colitis   • Coronary artery disease involving native coronary artery of native heart   • GERD (gastroesophageal reflux disease)   • Ruptured appendicitis   • STEMI (ST elevation myocardial infarction) (CMS/[facility])   • Hypertension   • Hyperlipidemia   • Alcohol use   • Overweight (BMI 25.0-29.9)   • Marijuana use     Past Medical History:   Diagnosis Date   • Coronary artery disease    • Hyperlipidemia    • Hypertension    • Myocardial infarction (CMS/HCC)      Past Surgical History:   Procedure Laterality Date   • CARDIAC CATHETERIZATION     • CARDIAC CATHETERIZATION N/A 8/10/2021    Procedure: Left Heart Cath;  Surgeon: Alessio Chung MD;  Location:  [facility];  Service: Cardiovascular;  Laterality: N/A;   • CATARACT EXTRACTION, BILATERAL     • CORONARY ARTERY BYPASS GRAFT N/A 8/10/2021    Procedure: CORONARY ARTERY BYPASS GRAFT X 4 WITH LEFT INTERNAL MAMMARY ARTERY, RIGHT LOWER EXTREMITY ENDOSCOPIC VEIN HARVEST, BILATERAL LOWER EXTREMITY OPEN VEIN HARVEST, AND PERFUSION; TRANSESOPHAGEAL ECHOCARDIOGRAM;  Surgeon: Zana Hernandez MD;  Location: [facility] OR;  Service: Cardiothoracic;  Laterality: N/A;   • CORONARY STENT PLACEMENT     • PERIPHERAL ARTERIAL STENT GRAFT       General Information     Row Name 21 0857          Physical Therapy Time and Intention    Document Type  evaluation s/p 8/10 CABG x 4, R EVH  -RAE     Mode of Treatment  physical therapy  -RAE     Row Name 21 0857          General Information    Patient Profile Reviewed  yes  -RAE     Prior Level  of Function  independent:;all household mobility;community mobility;ADL's  -RAE     Existing Precautions/Restrictions  fall;oxygen therapy device and L/min;sternal chest tube, external pacemaker  -RAE     Barriers to Rehab  medically complex  -RAE     Row Name 08/11/21 0857          Living Environment    Lives With  significant other  -RAE     [facility] Name 08/11/21 0857          Home Main Entrance    Number of Stairs, Main Entrance  three  -RAE     Stair Railings, Main Entrance  none  -RAE     Row Name 08/11/21 0857          Cognition    Orientation Status (Cognition)  oriented x 4  -RAE     Row Name 08/11/21 0857          Safety Issues, Functional Mobility    Impairments Affecting Function (Mobility)  endurance/activity tolerance;strength;shortness of breath;pain  -RAE       User Key  (r) = Recorded By, (t) = Taken By, (c) = Cosigned By    Initials Name Provider Type    Melvin Cohen, PT DPT Physical Therapist        Mobility     Row Name 08/11/21 0857          Bed Mobility    Bed Mobility  supine-sit  -RAE     Supine-Sit [place] (Bed Mobility)  minimum assist (75% patient effort);moderate assist (50% patient effort)  -RAE     Assistive Device (Bed Mobility)  head of bed elevated  -RAE     Row Name 08/11/21 0857          Sit-Stand Transfer    Sit-Stand [place] (Transfers)  minimum assist (75% patient effort)  -RAE     [facility] Name 08/11/21 0857          Gait/Stairs (Locomotion)    [place] Level (Gait)  minimum assist (75% patient effort)  -RAE     Distance in Feet (Gait)  200 ft w 3 standing rest break due to SOB  -RAE     Deviations/Abnormal Patterns (Gait)  isaac decreased;gait speed decreased  -RAE       User Key  (r) = Recorded By, (t) = Taken By, (c) = Cosigned By    Initials Name Provider Type    Melvin Cohen, PT DPT Physical Therapist        Obj/Interventions     Row Name 08/11/21 0857          Range of Motion Comprehensive    General Range of Motion  bilateral lower extremity ROM WFL  -RAE      Comment, General Range of Motion  B LE Ace wrap intact  -RAE     Row Name 08/11/21 0857          Strength Comprehensive (MMT)    Comment, General Manual Muscle Testing (MMT) Assessment  B LE grossly 4-/5  -RAE     Row Name 08/11/21 0857          Balance    Balance Assessment  sitting static balance;standing static balance  -RAE     Static Sitting Balance  WFL;unsupported;sitting in chair;sitting, edge of bed  -RAE     Static Standing Balance  mild impairment;supported;standing  -RAE     Row Name 08/11/21 0857          Sensory Assessment (Somatosensory)    Sensory Assessment (Somatosensory)  LE sensation intact  -RAE       User Key  (r) = Recorded By, (t) = Taken By, (c) = Cosigned By    Initials Name Provider Type    Melvin Cohen, PT DPT Physical Therapist        Goals/Plan     Row Name 08/11/21 0857          Bed Mobility Goal 1 (PT)    Activity/Assistive Device (Bed Mobility Goal 1, PT)  sit to supine/supine to sit  -RAE     [place] Level/Cues Needed (Bed Mobility Goal 1, PT)  supervision required  -RAE     Time Frame (Bed Mobility Goal 1, PT)  long term goal (LTG);10 days  -RAE     Progress/Outcomes (Bed Mobility Goal 1, PT)  goal ongoing  -RAE     Row Name 08/11/21 0857          Transfer Goal 1 (PT)    Activity/Assistive Device (Transfer Goal 1, PT)  sit-to-stand/stand-to-sit;bed-to-chair/chair-to-bed  -RAE     [place] Level/Cues Needed (Transfer Goal 1, PT)  supervision required  -RAE     Time Frame (Transfer Goal 1, PT)  long term goal (LTG);10 days  -RAE     Progress/Outcome (Transfer Goal 1, PT)  goal ongoing  -RAE     Row Name 08/11/21 0857          Gait Training Goal 1 (PT)    Activity/Assistive Device (Gait Training Goal 1, PT)  gait (walking locomotion);decrease fall risk;improve balance and speed;increase endurance/gait distance  -RAE     [place] Level (Gait Training Goal 1, PT)  supervision required  -RAE     Distance (Gait Training Goal 1, PT)  250 ft  -RAE     Time Frame (Gait Training Goal 1,  PT)  long term goal (LTG);10 days  -RAE     Progress/Outcome (Gait Training Goal 1, PT)  goal ongoing  -RAE     Row Name 08/11/21 0857          Stairs Goal 1 (PT)    Activity/Assistive Device (Stairs Goal 1, PT)  ascending stairs;descending stairs  -RAE     [place] Level/Cues Needed (Stairs Goal 1, PT)  supervision required  -RAE     Number of Stairs (Stairs Goal 1, PT)  3 steps  -RAE     Time Frame (Stairs Goal 1, PT)  long term goal (LTG);10 days  -RAE     Progress/Outcome (Stairs Goal 1, PT)  goal ongoing  -RAE       User Key  (r) = Recorded By, (t) = Taken By, (c) = Cosigned By    Initials Name Provider Type    Melvin Cohen, PT DPT Physical Therapist        Clinical Impression     Row Name 08/11/21 0857          Pain    Additional Documentation  Pain Scale: FACES Pre/Post-Treatment (Group)  -RAE     Row Name 08/11/21 0857          Pain Scale: Numbers Pre/Post-Treatment    Pain Intervention(s)  Repositioned;Ambulation/increased activity  -RAE     Row Name 08/11/21 0857          Pain Scale: FACES Pre/Post-Treatment    Pain: FACES Scale, Pretreatment  4-->hurts little more  -RAE     Posttreatment Pain Rating  2-->hurts little bit  -RAE     Pain Location - Orientation  incisional  -RAE     Pain Location  chest  -RAE     Row Name 08/11/21 0857          Plan of Care Review    Plan of Care Reviewed With  patient  -RAE     Outcome Summary  PT eval complete. He is alert and oriented x 4. He was educated on sternal precautions. He required min assist to stand and ambualte. He ambulates 200 ft around the ICU with 3 brief standign rest breaks due to SOB. PT will cont to progress gait, endurance and strengthening. Risa calderon.bev home with s.o..  -RAE     Row Name 08/11/21 0857          Therapy Assessment/Plan (PT)    Patient/Family Therapy Goals Statement (PT)  go home  -RAE     Rehab Potential (PT)  good, to achieve stated therapy goals  -RAE     Criteria for Skilled Interventions Met (PT)  yes;meets criteria;skilled treatment  is necessary  -RAE     Predicted Duration of Therapy Intervention (PT)  until d.c  -RAE     Row Name 08/11/21 0857          Vital Signs    Pre Systolic BP Rehab  88  -RAE     Pre Treatment Diastolic BP  58  -RAE     Post Systolic BP Rehab  100  -RAE     Post Treatment Diastolic BP  79  -RAE     Pretreatment Heart Rate (beats/min)  83  -RAE     Intratreatment Heart Rate (beats/min)  105  -RAE     Posttreatment Heart Rate (beats/min)  84  -RAE     Pre SpO2 (%)  93  -RAE     O2 Delivery Pre Treatment  supplemental O2  -RAE     O2 Delivery Intra Treatment  supplemental O2  -RAE     Post SpO2 (%)  92  -RAE     O2 Delivery Post Treatment  supplemental O2  -RAE     Pre Patient Position  Supine  -RAE     Intra Patient Position  Standing  -RAE     Post Patient Position  Sitting  -RAE     Row Name 08/11/21 0857          Positioning and Restraints    Pre-Treatment Position  in bed  -RAE     Post Treatment Position  chair  -RAE     In Chair  sitting;call light within reach;encouraged to call for assist;RUE elevated;LUE elevated;with nsg  -RAE       User Key  (r) = Recorded By, (t) = Taken By, (c) = Cosigned By    Initials Name Provider Type    Melvin Cohne, PT DPT Physical Therapist        Outcome Measures     Row Name 08/11/21 0857          How much help from another person do you currently need...    Turning from your back to your side while in flat bed without using bedrails?  2  -RAE     Moving from lying on back to sitting on the side of a flat bed without bedrails?  2  -RAE     Moving to and from a bed to a chair (including a wheelchair)?  3  -RAE     Standing up from a chair using your arms (e.g., wheelchair, bedside chair)?  3  -RAE     Climbing 3-5 steps with a railing?  2  -RAE     To walk in hospital room?  3  -RAE     AM-PAC 6 Clicks Score (PT)  15  -RAE     Row Name 08/11/21 0857          Functional Assessment    Outcome Measure Options  AM-PAC 6 Clicks Basic Mobility (PT)  -RAE       User Key  (r) = Recorded By, (t) = Taken By, (c) =  Cosigned By    Initials Name Provider Type    Melvin Cohen, PT DPT Physical Therapist                       Physical Therapy Education                 Title: PT OT SLP Therapies (In Progress)     Topic: Physical Therapy (In Progress)     Point: Mobility training (Done)     Learning Progress Summary           Patient Acceptance, E, VU,DU,NR by RAE at 8/11/2021 0857    Comment: benefits of activity, progression of care, sternal prec                   Point: Home exercise program (Not Started)     Learner Progress:  Not documented in this visit.          Point: Body mechanics (Not Started)     Learner Progress:  Not documented in this visit.          Point: Precautions (Done)     Learning Progress Summary           Patient Acceptance, E, VU,DU,NR by RAE at 8/11/2021 0857    Comment: benefits of activity, progression of care, sternal prec                               User Key     Initials Effective Dates Name Provider Type [facility]    RAE 08/02/16 -  Melvin Bro, PT DPT Physical Therapist PT              PT Recommendation and Plan  Planned Therapy Interventions (PT): bed mobility training, transfer training, gait training, balance training, home exercise program, patient/family education, postural re-education, stair training, strengthening  Plan of Care Reviewed With: patient  Outcome Summary: PT eval complete. He is alert and oriented x 4. He was educated on sternal precautions. He required min assist to stand and ambualte. He ambulates 200 ft around the ICU with 3 brief standign rest breaks due to SOB. PT will cont to progress gait, endurance and strengthening. Risa calderon.bev home with s.o..     Time Calculation:   PT Charges     Row Name 08/11/21 0958             Time Calculation    Start Time  0857  -RAE      Stop Time  0952  -RAE      Time Calculation (min)  55 min  -RAE      PT Received On  08/11/21  -RAE      PT Goal Re-Cert Due Date  08/21/21  -RAE        User Key  (r) = Recorded By, (t) = Taken By,  (c) = Cosigned By    Initials Name Provider Type    Mevlin Cohen, PT DPT Physical Therapist        Therapy Charges for Today     Code Description Service Date Service Provider Modifiers Qty    [id number] HC PT EVAL MOD COMPLEXITY 4 8/11/2021 Melvin Bro, PT DPT GP 1          PT G-Codes  Outcome Measure Options: AM-PAC 6 Clicks Basic Mobility (PT)  AM-PAC 6 Clicks Score (PT): 15    Melvin Bro, PT DPT  8/11/2021

## 2021-08-11 NOTE — PLAN OF CARE
Goal Outcome Evaluation:  Pt. Extubated at 0502 and SWAN pulled at 0630. Last CO-5.89 and CI-3.05. Currently on 1.1 mcg/kg/min of urban, nitro gtt., and amio. MST-220 ml, GERALDINE-80 ml, and UOP-965 ml out. Will continue to monitor.

## 2021-08-11 NOTE — PROGRESS NOTES
Pt awake, alert and able to follow commands. Good gag and cough reflex with positive leak test. VC 1317, NIF -49, RSBI 32, RR 17, Vt 505. RN agreed pt extubated at 0502. Placed on 3LPM NC

## 2021-08-11 NOTE — CASE MANAGEMENT/SOCIAL WORK
Discharge Planning Assessment  [facility]     Patient Name: Logan Mayen  MRN: [id number]  Today's Date: 8/11/2021    Admit Date: 8/10/2021    Discharge Needs Assessment     Row Name 08/11/21 1017       Living Environment    Lives With  significant other    Current Living Arrangements  home/apartment/condo    Primary Care Provided by  self    Provides Primary Care For  no one    Family Caregiver if Needed  significant other    Quality of Family Relationships  supportive;helpful;involved    Able to Return to Prior Arrangements  yes       Resource/Environmental Concerns    Resource/Environmental Concerns  none       Transition Planning    Patient/Family Anticipates Transition to  home with family    Patient/Family Anticipated Services at Transition  none    Transportation Anticipated  family or friend will provide       Discharge Needs Assessment    Readmission Within the Last 30 Days  no previous admission in last 30 days    Equipment Currently Used at Home  none    Concerns to be Addressed  no discharge needs identified    Anticipated Changes Related to Illness  none    Equipment Needed After Discharge  none    Current Discharge Risk  chronically ill    Discharge Coordination/Progress  Patient resides at home with his significant other.  Patient functions independently, has a PCP and RX coverage.  Patient plans to return home upon discharge.        Discharge Plan    No documentation.       Continued Care and Services - Admitted Since 8/10/2021    Coordination has not been started for this encounter.         Demographic Summary    No documentation.       Functional Status    No documentation.       Psychosocial    No documentation.       Abuse/Neglect    No documentation.       Legal    No documentation.       Substance Abuse    No documentation.       Patient Forms    No documentation.           JACKIE Aquino

## 2021-08-11 NOTE — OP NOTE
Date of Procedure:  8/10/2021    Preoperative Diagnosis:   1.  Coronary artery disease  2.  STEMI  3.  History of previous MI  4.  Hypertension  5.  Hyperlipidemia    Postoperative Diagnosis:  Same    Procedures Performed:  1. Emergency coronary artery bypass grafting-4 vessel (left internal mammary artery/left anterior descending, reverse saphenous vein graft/first obtuse marginal, Reverse saphenous vein graft/terminal obtuse marginal, and reverse saphenous vein graft/Posterior descending artery)  2. Left endoscopic vein harvest  3. Bilateral leg open vein harvest    Surgeon:  Zana Hernandez MD  Assistants:  Ryanne Lim and Paula Singleton   Anesthesia Staff:  CRNA: Yony Paulino CRNA  Anesthesia Type:  General    Estimated Blood Loss:  Minimal (Cell Saver)  Drains:  1. 19 Croatian Marc drain-left pleural space  2. 24 Croatian Marc drains-anterior and posterior mediastinum    Specimens:  None    Operative Findings:  Excellent arterial conduit. Good venous conduit for the performance of OM1 and terminal OM bypass grafting.  Acceptable venous conduit for the performance of PDA bypass grafting. The OM1 at site of grafting measured 3 mm in size and had mild atherosclerotic disease burden. Post bypass grafting had excellent arterial runoff.  The terminal OM at site of grafting measured 1.5 mm in size and had mild atherosclerotic disease burden. Post bypass grafting had excellent arterial runoff.The PDA artery measured 1.1 mm in size and had mild atherosclerotic disease burden.  Post bypass grafting had good arterial runoff. The LAD  artery measured 1.9 mm in size and had excellent arterial runoff with mild atherosclerotic disease burden.  Transesophageal echocardiography salient findings include improved nad preserved left ventricular function with mild-moderate mitral valve regurgitation.      Total aortic cross-clamp time: 98 minutes  Total cardiac bypass time: 144 minutes     Operative description in  detail:  The patient was taken to the operative suite emergently where he  was placed in a supine position.  Induction of general anesthesia and placement of a single-lumen endotracheal tube was performed without remark.  Appropriate arterial and venous access was established without remark.  Through the previously placed right internal jugular central venous line, a pulmonary artery catheter was floated into position.  The patient was then prepped and draped in the usual and sterile surgical fashion.  A timeout was performed.  Perioperative antibiotics were administered. Beta blocker was given.    A two team approach was utilized to harvest both the left internal mammary artery and the left greater saphenous vein.  Initially the leg was utilized to evaluate the quality of vein with decision made to harvest the right greater saphenous vein.  A cutdown was performed below the knee.  The vein was inspected with harvest initially started and quickly found that the vein will be inadequate for bypass grafting.  Briefly the left greater saphenous vein was taken at the level of the knee medially and taken in a prograde fashion for an anticipated length of vein to the fossa ovalis.  Blunt dissection was performed and each branch was taken using the [employer] device.  A counter stab incision was made with both proximal and distal control obtained.  The vein was extracted from a hemostatic tunnel.  The leg was closed in a layered fashion with Vicryl suture.  The vein was prepared and unfortunately the vein was also friable with multiple paired branches as well as inadequate tissue quality.  Only 1 length of vein could be harvested and with acceptable quality met.  Decision made to proceed with bilateral open vein harvest.  To facilitate harvesting Paula Singleton was asked to present to the room and aid with harvest of bilateral open vein harvest as he proceeded with bypass grafting.  Briefly the left greater saphenous vein  was taken at the level of the ankle open and taken in a prograde fashion for an anticipated length of vein.  All side branches were ligated in continuity and divided.  The vein was extracted from a hemostatic bed.  The leg was closed in a layered fashion with Vicryl suture.  The vein was prepared without remark.  This harvest that was placed in continuity with the endoscopic vein harvest such that the left leg can be considered to been fully utilized for use of greater saphenous vein.  Consideration was made for right mammary artery harvest; however, due to emergency nature of case and location of the first obtuse marginal decision making was made to rely on venous conduit.  Additional vein was harvested from the right in a mirrored fashion.    While the vein was being harvested, median sternotomy was performed by me. Pericardial fat in midline from the level of the innominate vein to the level of the diaphragm was divided in midline.  A Rultract device was used to expose the posterior sternal table.  The left internal mammary artery was taken down using a standard pedicle technique with a combination of electrocautery and/or clips to control all side branches.  At that time, the patient was systemically anticoagulated with IV heparin.  A 19 English Marc drain was placed in the left pleural space.  After suitable time of circulating heparin, clips were placed doubly onto the mammary artery distally and it was divided proximal to the previously placed clips.  It had excellent arterial inflow.  The mammary artery was controlled distally.  The mammary artery harvest bed was hemostatic.  The Rultract device was removed from the sterile field and a [place] retractor was used for exposure.  The mammary artery was prepared for bypass grafting and deemed excellent.  A pericardial well was created. I elected to cannulate the heart centrally accessing distally the ascending aorta and the right atrial appendage.  Each cannula  was placed in continuity with the appropriate pump line. Retrograde autologous prime was completed as indicated.  A combined cardioplegia/aortic root vent set was secured with a horizontal mattress 4-0 Prolene suture.  With an appropriate ACT and all in readiness, cardiopulmonary bypass was commenced.  I dissected out the first and terminal obtuse marginal, PDA, and the LAD for suitable sites for bypass grafting.  The LAD was found with relative ease.  The right coronary artery system was encased in significant epicardial fat with multiple small bridging vessels.  Dissection to identify a suitable site was somewhat tedious but ultimately a site was located.  The first obtuse marginal was intramyocardial with tedious dissection required to find this vessel.  Adding to dissection challenges including a superficial overriding and intramyocardial first obtuse marginal requiring ligation of this to gain access suitably.  Epicardial fat was dissected to confirm a left PDA or terminal obtuse marginal.  This was done.  The aforementioned added time the overall pump run.  With that, I proceeded to apply the aortic cross-clamp and administered cardioplegia utilizing a warm induction strategy with amino acid enriched component.  Upon achieving electrical-mechanical arrest, cold blood potassium cardioplegia was administered to a total standard dose.  We did implement systemic hypothermia mild active and applied topical hypothermia to the ventricle.  At appropriate intervals, doses of cardioplegia were administered throughout the conduct of bypass grafting.     I directed my attention towards the PDA.  A coronary arteriotomy was made and augmented to size with Naik scissors.  It is as per operative findings.  The anastomosis was constructed in an end-to-side orientation with running 7-0 Prolene suture.  With that its proximal anastomosis was  constructed following aortotomy with 11 blade and augmented size with 4 mm arterial  punch  Subsequently.   This was constructed in a side aorta end vein graft fashion with running 6-0 Prolene suture. An AC  was placed.  The graft was assessed for lay which was excellent. It is without tension or torsion.  To facilitate hemostasis I did oversew the length of dissection with a running 4-0 Prolene suture.  Care was taken was made not to encroach upon the PDA nor delay of the bypass graft.    To that end I directed my attention towards the first obtuse marginal.  A coronary arteriotomy was made and augmented to size with Naik scissors.  It is as per operative findings.  The anastomosis was constructed in an end-to-side orientation with running 7-0 Prolene suture.  With that its proximal anastomosis was constructed following aortotomy with 11 blade and augmented size with 4 mm arterial punch subsequently.  This was constructed in a side aorta end vein graft fashion with running 6-0 Prolene suture. An AC  was placed.  The graft was assessed for lay which was excellent.  It is without tension or torsion.     To that end I directed my attention towards the terminal obtuse marginal.  A coronary arteriotomy was made and augmented to size with Naik scissors.  It is as per operative findings.  The anastomosis was constructed in an end-to-side orientation with running 7-0 Prolene suture.  With that its proximal anastomosis was constructed following aortotomy with 11 blade and augmented size with 4 mm arterial punch subsequently.  This was constructed in a side aorta end vein graft fashion with running 6-0 Prolene suture. An AC  was placed.  The graft was assessed for lay which was excellent.  It is without tension or torsion.     During a dose of cardioplegia, a pericardial slit left lateral was made while dividing associate pericardiophrenic fat and vasculature while being mindful of the lay of the phrenic nerve.  As such I proceeded to obtain control proximally onto the mammary artery  and spatulated it distally.  I grafted this onto the LAD following coronary arteriotomy using previous described techniques.  The anastomosis was constructed in an end-to-side orientation with running 7-0 Prolene suture.  It is as per operative findings and the anastomosis was hemostatic.  I did tack the mammary artery pedicle to the anterior aspect heart with 6-0 Prolene sutures.    With that being accomplished, a terminal hotshot was administered.  The patient was placed in Trendelenburg position.  Upon completion of terminal hotshot and placement of temporary epicardial pacing wires, with the aortic vent on high and pump flows diminished, the aortic cross-clamp was released.  With that, full support was implemented.  A perfusable rhythm spontaneously returned. A nonworking beating phase was implemented.  Ventilation restored.  I surveyed each graft and each anastomosis was hemostatic and had excellent lay.  With all in readiness, the heart was allowed to fill.  De-airing maneuvers were performed as guided by transesophageal echocardiography.  With that the heart was decompressed and we removed the aortic root vent/cardioplegia cannula set.  Its associated pursestring suture was tied securely and this was reinforced as per matter of routine. The table was now placed in neutral position.  With all in readiness, we proceeded to wean from and separate from cardiopulmonary bypass.  I did decannulate the venous line and snared down its associated pursestring suture.  Systemic intravenous protamine was administered.  All associated blood volume was returned to the patient. With continued good hemodynamics, I decannulated the arterial line and tied down its associated pursestring sutures.  At this time I tied down the previously snared pursestring suture.  The mediastinum was drained with 24 Ivorian Marc drains placed anteriorly and posteriorly.  I surveyed the chest and hemostasis was pristine.  With that I impregnated  the sternal edges with vancomycin, thrombin, and Gelfoam paste.  The sternum was reapproximated with stainless sterile wires placed in an interrupted fashion.  In layers anatomically the soft tissue planes were reapproximated. Instruments, sharps, and sponge counts were reported as correct.  Medical coagulopathy was encountered with platelets administered.  Fresh frozen plasma was ordered with plans to administer this in the ICU.     Complications: None     Disposition: Transferred to ICU in stable and guarded condition.

## 2021-08-12 ENCOUNTER — APPOINTMENT (OUTPATIENT)
Dept: GENERAL RADIOLOGY | Facility: HOSPITAL | Age: 61
End: 2021-08-12

## 2021-08-12 LAB
ANION GAP SERPL CALCULATED.3IONS-SCNC: 8 MMOL/L (ref 5–15)
BH BB BLOOD EXPIRATION DATE: NORMAL
BH BB BLOOD TYPE BARCODE: 5100
BH BB BLOOD TYPE BARCODE: 5100
BH BB BLOOD TYPE BARCODE: 6200
BH BB DISPENSE STATUS: NORMAL
BH BB PRODUCT CODE: NORMAL
BH BB UNIT NUMBER: NORMAL
BUN SERPL-MCNC: 10 MG/DL (ref 6–20)
BUN/CREAT SERPL: 14.7 (ref 7–25)
CALCIUM SPEC-SCNC: 8.2 MG/DL (ref 8.6–10.5)
CHLORIDE SERPL-SCNC: 103 MMOL/L (ref 98–107)
CO2 SERPL-SCNC: 24 MMOL/L (ref 22–29)
CREAT SERPL-MCNC: 0.68 MG/DL (ref 0.76–1.27)
DEPRECATED RDW RBC AUTO: 41.2 FL (ref 37–54)
ERYTHROCYTE [DISTWIDTH] IN BLOOD BY AUTOMATED COUNT: 12 % (ref 12.3–15.4)
GFR SERPL CREATININE-BSD FRML MDRD: 119 ML/MIN/1.73
GLUCOSE SERPL-MCNC: 142 MG/DL (ref 65–99)
HCT VFR BLD AUTO: 20.7 % (ref 37.5–51)
HGB BLD-MCNC: 7.1 G/DL (ref 13–17.7)
LV EF 2D ECHO EST: 50 %
MAXIMAL PREDICTED HEART RATE: 161 BPM
MCH RBC QN AUTO: 32.7 PG (ref 26.6–33)
MCHC RBC AUTO-ENTMCNC: 34.3 G/DL (ref 31.5–35.7)
MCV RBC AUTO: 95.4 FL (ref 79–97)
PLATELET # BLD AUTO: 107 10*3/MM3 (ref 140–450)
PMV BLD AUTO: 10.5 FL (ref 6–12)
POTASSIUM SERPL-SCNC: 3.9 MMOL/L (ref 3.5–5.2)
QT INTERVAL: 374 MS
QTC INTERVAL: 452 MS
RBC # BLD AUTO: 2.17 10*6/MM3 (ref 4.14–5.8)
SODIUM SERPL-SCNC: 135 MMOL/L (ref 136–145)
STRESS TARGET HR: 137 BPM
UNIT  ABO: NORMAL
UNIT  RH: NORMAL
WBC # BLD AUTO: 6.83 10*3/MM3 (ref 3.4–10.8)

## 2021-08-12 PROCEDURE — 93005 ELECTROCARDIOGRAM TRACING: CPT | Performed by: THORACIC SURGERY (CARDIOTHORACIC VASCULAR SURGERY)

## 2021-08-12 PROCEDURE — P9016 RBC LEUKOCYTES REDUCED: HCPCS

## 2021-08-12 PROCEDURE — 80048 BASIC METABOLIC PNL TOTAL CA: CPT | Performed by: THORACIC SURGERY (CARDIOTHORACIC VASCULAR SURGERY)

## 2021-08-12 PROCEDURE — 93010 ELECTROCARDIOGRAM REPORT: CPT | Performed by: INTERNAL MEDICINE

## 2021-08-12 PROCEDURE — 25010000002 CEFAZOLIN PER 500 MG: Performed by: THORACIC SURGERY (CARDIOTHORACIC VASCULAR SURGERY)

## 2021-08-12 PROCEDURE — 36430 TRANSFUSION BLD/BLD COMPNT: CPT

## 2021-08-12 PROCEDURE — 25010000002 ENOXAPARIN PER 10 MG: Performed by: THORACIC SURGERY (CARDIOTHORACIC VASCULAR SURGERY)

## 2021-08-12 PROCEDURE — 94799 UNLISTED PULMONARY SVC/PX: CPT

## 2021-08-12 PROCEDURE — 25010000002 ENOXAPARIN PER 10 MG: Performed by: NURSE PRACTITIONER

## 2021-08-12 PROCEDURE — 86900 BLOOD TYPING SEROLOGIC ABO: CPT

## 2021-08-12 PROCEDURE — 86920 COMPATIBILITY TEST SPIN: CPT

## 2021-08-12 PROCEDURE — 25010000002 VANCOMYCIN 10 G RECONSTITUTED SOLUTION: Performed by: THORACIC SURGERY (CARDIOTHORACIC VASCULAR SURGERY)

## 2021-08-12 PROCEDURE — 71045 X-RAY EXAM CHEST 1 VIEW: CPT

## 2021-08-12 PROCEDURE — 97530 THERAPEUTIC ACTIVITIES: CPT

## 2021-08-12 PROCEDURE — 97116 GAIT TRAINING THERAPY: CPT

## 2021-08-12 PROCEDURE — 25010000002 FUROSEMIDE PER 20 MG: Performed by: NURSE PRACTITIONER

## 2021-08-12 PROCEDURE — 85027 COMPLETE CBC AUTOMATED: CPT | Performed by: THORACIC SURGERY (CARDIOTHORACIC VASCULAR SURGERY)

## 2021-08-12 RX ORDER — FUROSEMIDE 10 MG/ML
20 INJECTION INTRAMUSCULAR; INTRAVENOUS
Status: DISCONTINUED | OUTPATIENT
Start: 2021-08-12 | End: 2021-08-13

## 2021-08-12 RX ORDER — BISACODYL 10 MG
10 SUPPOSITORY, RECTAL RECTAL DAILY PRN
Status: DISCONTINUED | OUTPATIENT
Start: 2021-08-12 | End: 2021-08-13 | Stop reason: HOSPADM

## 2021-08-12 RX ORDER — POTASSIUM CHLORIDE 750 MG/1
20 CAPSULE, EXTENDED RELEASE ORAL 2 TIMES DAILY WITH MEALS
Status: DISCONTINUED | OUTPATIENT
Start: 2021-08-12 | End: 2021-08-13

## 2021-08-12 RX ORDER — IPRATROPIUM BROMIDE AND ALBUTEROL SULFATE 2.5; .5 MG/3ML; MG/3ML
3 SOLUTION RESPIRATORY (INHALATION)
Status: DISCONTINUED | OUTPATIENT
Start: 2021-08-12 | End: 2021-08-12

## 2021-08-12 RX ADMIN — PANTOPRAZOLE SODIUM 40 MG: 40 TABLET, DELAYED RELEASE ORAL at 05:09

## 2021-08-12 RX ADMIN — PREGABALIN 25 MG: 25 CAPSULE ORAL at 08:20

## 2021-08-12 RX ADMIN — ATORVASTATIN CALCIUM 20 MG: 10 TABLET, FILM COATED ORAL at 20:27

## 2021-08-12 RX ADMIN — ACETAMINOPHEN 650 MG: 325 TABLET, FILM COATED ORAL at 13:30

## 2021-08-12 RX ADMIN — CEFAZOLIN 2 G: 10 INJECTION, POWDER, FOR SOLUTION INTRAVENOUS at 08:20

## 2021-08-12 RX ADMIN — ASPIRIN 81 MG: 81 TABLET, COATED ORAL at 08:20

## 2021-08-12 RX ADMIN — METOPROLOL TARTRATE 12.5 MG: 25 TABLET, FILM COATED ORAL at 08:20

## 2021-08-12 RX ADMIN — BISACODYL 10 MG: 5 TABLET ORAL at 08:20

## 2021-08-12 RX ADMIN — METOPROLOL TARTRATE 25 MG: 25 TABLET, FILM COATED ORAL at 20:27

## 2021-08-12 RX ADMIN — Medication 1 APPLICATION: at 08:20

## 2021-08-12 RX ADMIN — ENOXAPARIN SODIUM 30 MG: 30 INJECTION SUBCUTANEOUS at 20:28

## 2021-08-12 RX ADMIN — LIDOCAINE 2 PATCH: 50 PATCH CUTANEOUS at 08:20

## 2021-08-12 RX ADMIN — ACETAMINOPHEN 650 MG: 325 TABLET, FILM COATED ORAL at 05:09

## 2021-08-12 RX ADMIN — HYDROCODONE BITARTRATE AND ACETAMINOPHEN 1 TABLET: 5; 325 TABLET ORAL at 03:48

## 2021-08-12 RX ADMIN — IPRATROPIUM BROMIDE AND ALBUTEROL SULFATE 3 ML: 2.5; .5 SOLUTION RESPIRATORY (INHALATION) at 14:28

## 2021-08-12 RX ADMIN — ENOXAPARIN SODIUM 30 MG: 30 INJECTION SUBCUTANEOUS at 08:20

## 2021-08-12 RX ADMIN — POTASSIUM CHLORIDE 20 MEQ: 10 CAPSULE, COATED, EXTENDED RELEASE ORAL at 17:32

## 2021-08-12 RX ADMIN — ACETAMINOPHEN 650 MG: 325 TABLET, FILM COATED ORAL at 23:04

## 2021-08-12 RX ADMIN — AMIODARONE HYDROCHLORIDE 400 MG: 200 TABLET ORAL at 17:32

## 2021-08-12 RX ADMIN — BISACODYL 10 MG: 10 SUPPOSITORY RECTAL at 15:53

## 2021-08-12 RX ADMIN — CLOPIDOGREL 75 MG: 75 TABLET, FILM COATED ORAL at 17:32

## 2021-08-12 RX ADMIN — AMIODARONE HYDROCHLORIDE 400 MG: 200 TABLET ORAL at 08:20

## 2021-08-12 RX ADMIN — POLYETHYLENE GLYCOL 3350 17 G: 17 POWDER, FOR SOLUTION ORAL at 08:20

## 2021-08-12 RX ADMIN — VANCOMYCIN HYDROCHLORIDE 1250 MG: 10 INJECTION, POWDER, LYOPHILIZED, FOR SOLUTION INTRAVENOUS at 02:38

## 2021-08-12 RX ADMIN — CEFAZOLIN 2 G: 10 INJECTION, POWDER, FOR SOLUTION INTRAVENOUS at 00:26

## 2021-08-12 RX ADMIN — PREGABALIN 25 MG: 25 CAPSULE ORAL at 20:39

## 2021-08-12 RX ADMIN — IPRATROPIUM BROMIDE AND ALBUTEROL SULFATE 3 ML: 2.5; .5 SOLUTION RESPIRATORY (INHALATION) at 06:22

## 2021-08-12 RX ADMIN — FUROSEMIDE 20 MG: 10 INJECTION, SOLUTION INTRAMUSCULAR; INTRAVENOUS at 17:32

## 2021-08-12 RX ADMIN — IPRATROPIUM BROMIDE AND ALBUTEROL SULFATE 3 ML: 2.5; .5 SOLUTION RESPIRATORY (INHALATION) at 10:21

## 2021-08-12 NOTE — THERAPY TREATMENT NOTE
Acute Care - Physical Therapy Treatment Note  [facility]     Patient Name: Logan Mayen  : 1960  MRN: [id number]  Today's Date: 2021      Visit Dx:     ICD-10-CM ICD-9-CM   1. ST elevation myocardial infarction (STEMI), unspecified artery (CMS/HCC)  I21.3 410.90   2. Myocardial infarction (CMS/HCC)  I21.9 410.90   3. Impaired mobility  Z74.09 799.89     Patient Active Problem List   Diagnosis   • Ileus (CMS/HCC)   • Colitis   • Coronary artery disease involving native coronary artery of native heart   • GERD (gastroesophageal reflux disease)   • Ruptured appendicitis   • STEMI (ST elevation myocardial infarction) (CMS/[facility])   • Hypertension   • Hyperlipidemia   • Alcohol use   • Overweight (BMI 25.0-29.9)   • Marijuana use     Past Medical History:   Diagnosis Date   • Coronary artery disease    • Hyperlipidemia    • Hypertension    • Myocardial infarction (CMS/HCC)      Past Surgical History:   Procedure Laterality Date   • CARDIAC CATHETERIZATION     • CARDIAC CATHETERIZATION N/A 8/10/2021    Procedure: Left Heart Cath;  Surgeon: Alessio Chung MD;  Location: [facility] CATH INVASIVE LOCATION;  Service: Cardiovascular;  Laterality: N/A;   • CATARACT EXTRACTION, BILATERAL     • CORONARY ARTERY BYPASS GRAFT N/A 8/10/2021    Procedure: CORONARY ARTERY BYPASS GRAFT X 4 WITH LEFT INTERNAL MAMMARY ARTERY, RIGHT LOWER EXTREMITY ENDOSCOPIC VEIN HARVEST, BILATERAL LOWER EXTREMITY OPEN VEIN HARVEST, AND PERFUSION; TRANSESOPHAGEAL ECHOCARDIOGRAM;  Surgeon: Zana Hernandez MD;  Location: [facility] OR;  Service: Cardiothoracic;  Laterality: N/A;   • CORONARY STENT PLACEMENT     • PERIPHERAL ARTERIAL STENT GRAFT          PT Assessment (last 12 hours)      PT Evaluation and Treatment     Row Name 21 1330 21 0905       Physical Therapy Time and Intention    Subjective Information  complains of;pain  -AE  complains of;pain  -AE    Document Type  therapy note (daily note)  -AE  therapy note (daily note)   -AE    Mode of Treatment  physical therapy  -AE  physical therapy  -AE    Row Name 08/12/21 1330 08/12/21 0905       General Information    Existing Precautions/Restrictions  fall;cardiac chest tube  -AE  fall;cardiac chest tube  -AE    Row Name 08/12/21 1330 08/12/21 0905       Pain Scale: Word Pre/Post-Treatment    Pain: Word Scale, Pretreatment  2 - mild pain  -AE  2 - mild pain  -AE    Posttreatment Pain Rating  2 - mild pain  -AE  2 - mild pain  -AE    Pain Location - Orientation  incisional  -AE  incisional  -AE    Pain Location  chest  -AE  chest  -AE    Row Name 08/12/21 1330          Bed Mobility    Comment (Bed Mobility)  up in chair  -AE     Row Name 08/12/21 1330 08/12/21 0905       Transfers    Transfers  --  toilet transfer cga to BSC and back to chair  -AE    Sit-Stand [place] (Transfers)  contact guard  -AE  contact guard;verbal cues  -AE    Row Name 08/12/21 1330 08/12/21 0905       Gait/Stairs (Locomotion)    [place] Level (Gait)  contact guard  -AE  contact guard  -AE    Distance in Feet (Gait)  200  -AE  200 x 1 standing rest  -AE    Deviations/Abnormal Patterns (Gait)  isaac decreased  -AE  --    Row Name 08/12/21 1330          Motor Skills    Therapeutic Exercise  -- cardiac protocol x 20 reps  -AE     Row Name 08/12/21 0905          Aerobic Exercise    Comment, Aerobic Exercise (Therapeutic Exercise)  -- cardiac protocol 20 reps  -AE     Row Name             Wound 08/10/21 1446 sternal Incision    Wound - Properties Group Placement Date: 08/10/21  -DS Placement Time: 1446  -DS Location: sternal  -DS Primary Wound Type: Incision  -DS    Retired Wound - Properties Group Date first assessed: 08/10/21  -DS Time first assessed: 1446  -DS Location: sternal  -DS Primary Wound Type: Incision  -DS    Row Name             Wound 08/10/21 1702 Right leg Incision    Wound - Properties Group Placement Date: 08/10/21  -KR Placement Time: 1702  -KR Present on Hospital Admission: N  -KR Side: Right   -KR Location: leg  -KR Primary Wound Type: Incision  -KR    Retired Wound - Properties Group Date first assessed: 08/10/21  -KR Time first assessed: 1702  -KR Present on Hospital Admission: N  -KR Side: Right  -KR Location: leg  -KR Primary Wound Type: Incision  -KR    Row Name             Wound 08/10/21 1702 Left leg Incision    Wound - Properties Group Placement Date: 08/10/21  -KR Placement Time: 1702  -KR Present on Hospital Admission: N  -KR Side: Left  -KR Location: leg  -KR Primary Wound Type: Incision  -KR    Retired Wound - Properties Group Date first assessed: 08/10/21  -KR Time first assessed: 1702  -KR Present on Hospital Admission: N  -KR Side: Left  -KR Location: leg  -KR Primary Wound Type: Incision  -KR    Row Name 08/12/21 1330 08/12/21 0905       Vital Signs    Pre SpO2 (%)  96  -AE  98  -AE    O2 Delivery Pre Treatment  room air  -AE  supplemental O2 switche to room air 93% after 3 min  -AE    Intra SpO2 (%)  --  91  -AE    O2 Delivery Intra Treatment  room air  -AE  room air  -AE    O2 Delivery Post Treatment  room air unable to obtain accurate reading on pulse ox  -AE  room air unable to get accurate reading  -AE    Row Name 08/12/21 1330 08/12/21 0905       Positioning and Restraints    Pre-Treatment Position  sitting in chair/recliner  -AE  sitting in chair/recliner  -AE    Post Treatment Position  chair  -AE  chair  -AE    In Chair  sitting;call light within reach  -AE  sitting;call light within reach  -AE      User Key  (r) = Recorded By, (t) = Taken By, (c) = Cosigned By    Initials Name Provider Type    AE Alessandra Joyner, MARIBEL Physical Therapy Assistant    Paul Corbin, RN Registered Nurse    To Gonzalez --        Physical Therapy Education                 Title: PT OT SLP Therapies (In Progress)     Topic: Physical Therapy (In Progress)     Point: Mobility training (Done)     Learning Progress Summary           Patient Acceptance, E, NEMESIO,JUDITH,NR by RAE at 8/11/2021 0857     Comment: benefits of activity, progression of care, sternal prec                   Point: Home exercise program (Not Started)     Learner Progress:  Not documented in this visit.          Point: Body mechanics (Not Started)     Learner Progress:  Not documented in this visit.          Point: Precautions (Done)     Learning Progress Summary           Patient Acceptance, E, VU,DU,NR by RAE at 8/11/2021 0857    Comment: benefits of activity, progression of care, sternal prec                               User Key     Initials Effective Dates Name Provider Type Discipline    RAE 08/02/16 -  Melvin Bro, PT DPT Physical Therapist PT              PT Recommendation and Plan     Plan of Care Reviewed With: patient  Progress: improving  Outcome Summary: Pt was cga to stand and to walk 200ft on room air. Pt was 93% after being on room air 3 min and during gait 02 dropped to 91%. PT will continue to progress patient.       Time Calculation:   PT Charges     Row Name 08/12/21 1427 08/12/21 0959          Time Calculation    Start Time  1330  -AE  0905  -AE     Stop Time  1345  -AE  0943  -AE     Time Calculation (min)  15 min  -AE  38 min  -AE     PT Received On  08/12/21  -AE  08/12/21  -AE     PT Goal Re-Cert Due Date  08/21/21  -AE  08/21/21  -AE        Time Calculation- PT    Total Timed Code Minutes- PT  15 minute(s)  -AE  38 minute(s)  -AE        Timed Charges    80935 - Gait Training Minutes   15  -AE  30  -AE     19462 - PT Therapeutic Activity Minutes  --  8  -AE        Total Minutes    Timed Charges Total Minutes  15  -AE  38  -AE      Total Minutes  15  -AE  38  -AE       User Key  (r) = Recorded By, (t) = Taken By, (c) = Cosigned By    Initials Name Provider Type    AE Alessandra Joyner PTA Physical Therapy Assistant        Therapy Charges for Today     Code Description Service Date Service Provider Modifiers Qty    92488764548 HC GAIT TRAINING EA 15 MIN 8/12/2021 Alessandra Joyner PTA GP 2    24412704068 HC PT  THERAPEUTIC ACT EA 15 MIN 8/12/2021 Alessandra Joyner, PTA GP 1    58733753952 HC GAIT TRAINING EA 15 MIN 8/12/2021 Alessandra Joyner, MARIBEL GP 1          PT G-Codes  Outcome Measure Options: AM-PAC 6 Clicks Basic Mobility (PT)  AM-PAC 6 Clicks Score (PT): 15    Alessandra Joyner PTA  8/12/2021

## 2021-08-12 NOTE — PROGRESS NOTES
"Coronary artery bypass grafting -4 vessel, right lower extremity endoscopic vein harvest, bilateral lower extremity open vein harvest    POD 2    Up in the chair. Says he is feeling better today. On 2 liters nasal cannula with O2 sat 97%. IS 1000 ml. Improved pain control after starting Norco. Rates pain 2/10. Receiving 1 unit of blood for hemoglobin 7.1 and hematocrit 20.     IV gtts: PRBC, IVF   Telemetry: sinus 90s frequent PVC    Visit Vitals  /57   Pulse 91   Temp 98.7 °F (37.1 °C) (Oral)   Resp 16   Ht 170.2 cm (67.01\")   Wt 91.4 kg (201 lb 8 oz)   SpO2 97%   BMI 31.55 kg/m²     Pre operative weight 178 pounds     Intake/Output Summary (Last 24 hours) at 8/12/2021 0846  Last data filed at 8/12/2021 0843  Gross per 24 hour   Intake 2841.5 ml   Output 1480 ml   Net 1361.5 ml     Mediastinal tube output: 260 ml/24 hours   Left Marc drain: 125 ml/24 hours     Labs:      Chest x-ray: no pneumothorax, improving atelectasis    Physical Exam:  General: No apparent distress. In good spirits. Up in the chair.   Cardiovascular: Regular rate and rhythm without murmur, rubs, or gallops.    Pulmonary: Clear to auscultation bilaterally without wheezing, rubs, or rales.  Chest: Sternotomy incision clean, dry, and intact. Sternum stable. No clicks. Mediastinal tubes x 2 and Marc drain x 1 with dressing clean, dry, and intact.    Abdomen: Soft, nondistended and nontender.  Extremities: Warm, moves all extremities. Bilateral saphenectomy sites clean, dry, and intact. Edematous extremities.   Neurologic: Grossly intact with no focal deficits.       Impression:  STEMI   Coronary artery disease involving native coronary artery of native heart  Previous coronary artery stent placement post myocardial infarction   GERD (gastroesophageal reflux disease)  Hypertension  Hyperlipidemia  Alcohol use  Improved pain control    Plan:  Complete blood transfusion  Keep  Mediastinal and pleural tubes  Daily standing weight    Bowel " regimen  Increase BB  Encourage pulmonary toilet and ambulation   Routine post cardiac surgery regimen  Anticipate transfer to  later today   DW patient and nursing

## 2021-08-12 NOTE — PLAN OF CARE
Goal Outcome Evaluation:  Plan of Care Reviewed With: patient        Progress: improving  Outcome Summary: Pt was cga to stand and to walk 200ft on room air. Pt was 93% after being on room air 3 min and during gait 02 dropped to 91%. PT will continue to progress patient.

## 2021-08-12 NOTE — THERAPY TREATMENT NOTE
Acute Care - Physical Therapy Treatment Note  [facility]     Patient Name: Logan Mayen  : 1960  MRN: [id number]  Today's Date: 2021      Visit Dx:     ICD-10-CM ICD-9-CM   1. ST elevation myocardial infarction (STEMI), unspecified artery (CMS/HCC)  I21.3 410.90   2. Myocardial infarction (CMS/HCC)  I21.9 410.90   3. Impaired mobility  Z74.09 799.89     Patient Active Problem List   Diagnosis   • Ileus (CMS/HCC)   • Colitis   • Coronary artery disease involving native coronary artery of native heart   • GERD (gastroesophageal reflux disease)   • Ruptured appendicitis   • STEMI (ST elevation myocardial infarction) (CMS/[facility])   • Hypertension   • Hyperlipidemia   • Alcohol use   • Overweight (BMI 25.0-29.9)   • Marijuana use     Past Medical History:   Diagnosis Date   • Coronary artery disease    • Hyperlipidemia    • Hypertension    • Myocardial infarction (CMS/HCC)      Past Surgical History:   Procedure Laterality Date   • CARDIAC CATHETERIZATION     • CARDIAC CATHETERIZATION N/A 8/10/2021    Procedure: Left Heart Cath;  Surgeon: Alessio Chung MD;  Location: [facility] CATH INVASIVE LOCATION;  Service: Cardiovascular;  Laterality: N/A;   • CATARACT EXTRACTION, BILATERAL     • CORONARY ARTERY BYPASS GRAFT N/A 8/10/2021    Procedure: CORONARY ARTERY BYPASS GRAFT X 4 WITH LEFT INTERNAL MAMMARY ARTERY, RIGHT LOWER EXTREMITY ENDOSCOPIC VEIN HARVEST, BILATERAL LOWER EXTREMITY OPEN VEIN HARVEST, AND PERFUSION; TRANSESOPHAGEAL ECHOCARDIOGRAM;  Surgeon: Zana Hernandez MD;  Location: [facility] [place] OR;  Service: Cardiothoracic;  Laterality: N/A;   • CORONARY STENT PLACEMENT     • PERIPHERAL ARTERIAL STENT GRAFT          PT Assessment (last 12 hours)      PT Evaluation and Treatment     Row Name 21 0905          Physical Therapy Time and Intention    Subjective Information  complains of;pain  -AE     Document Type  therapy note (daily note)  -AE     Mode of Treatment  physical therapy  -AE     Row Name  08/12/21 0905          General Information    Existing Precautions/Restrictions  fall;cardiac chest tube  -AE     Row Name 08/12/21 0905          Pain Scale: Word Pre/Post-Treatment    Pain: Word Scale, Pretreatment  2 - mild pain  -AE     Posttreatment Pain Rating  2 - mild pain  -AE     Pain Location - Orientation  incisional  -AE     Pain Location  chest  -AE     Row Name 08/12/21 0905          Transfers    Transfers  toilet transfer cga to BSC and back to chair  -AE     Sit-Stand Major (Transfers)  contact guard;verbal cues  -AE     Row Name 08/12/21 0905          Gait/Stairs (Locomotion)    [place] Level (Gait)  contact guard  -AE     Distance in Feet (Gait)  200 x 1 standing rest  -AE     Row Name 08/12/21 0905          Aerobic Exercise    Comment, Aerobic Exercise (Therapeutic Exercise)  -- cardiac protocol 20 reps  -AE     Row Name             Wound 08/10/21 1446 sternal Incision    Wound - Properties Group Placement Date: 08/10/21  -DS Placement Time: 1446  -DS Location: sternal  -DS Primary Wound Type: Incision  -DS    Retired Wound - Properties Group Date first assessed: 08/10/21  -DS Time first assessed: 1446  -DS Location: sternal  -DS Primary Wound Type: Incision  -DS    Row Name             Wound 08/10/21 1702 Right leg Incision    Wound - Properties Group Placement Date: 08/10/21  -KR Placement Time: 1702  -KR Present on Hospital Admission: N  -KR Side: Right  -KR Location: leg  -KR Primary Wound Type: Incision  -KR    Retired Wound - Properties Group Date first assessed: 08/10/21  -KR Time first assessed: 1702  -KR Present on Hospital Admission: N  -KR Side: Right  -KR Location: leg  -KR Primary Wound Type: Incision  -KR    Row Name             Wound 08/10/21 1702 Left leg Incision    Wound - Properties Group Placement Date: 08/10/21  -KR Placement Time: 1702  -KR Present on Hospital Admission: N  -KR Side: Left  -KR Location: leg  -KR Primary Wound Type: Incision  -KR    Retired Wound -  Properties Group Date first assessed: 08/10/21  -KR Time first assessed: 1702  -KR Present on Hospital Admission: N  -KR Side: Left  -KR Location: leg  -KR Primary Wound Type: Incision  -KR    Row Name 08/12/21 0905          Vital Signs    Pre SpO2 (%)  98  -AE     O2 Delivery Pre Treatment  supplemental O2 switche to room air 93% after 3 min  -AE     Intra SpO2 (%)  91  -AE     O2 Delivery Intra Treatment  room air  -AE     O2 Delivery Post Treatment  room air unable to get accurate reading  -AE     Row Name 08/12/21 0905          Positioning and Restraints    Pre-Treatment Position  sitting in chair/recliner  -AE     Post Treatment Position  chair  -AE     In Chair  sitting;call light within reach  -AE       User Key  (r) = Recorded By, (t) = Taken By, (c) = Cosigned By    Initials Name Provider Type    AE Alessandra Joyner, PTA Physical Therapy Assistant    Paul Corbin, RN Registered Nurse    To Gonzalez --        Physical Therapy Education                 Title: PT OT SLP Therapies (In Progress)     Topic: Physical Therapy (In Progress)     Point: Mobility training (Done)     Learning Progress Summary           Patient Acceptance, E, VU,DU,NR by RAE at 8/11/2021 0857    Comment: benefits of activity, progression of care, sternal prec                   Point: Home exercise program (Not Started)     Learner Progress:  Not documented in this visit.          Point: Body mechanics (Not Started)     Learner Progress:  Not documented in this visit.          Point: Precautions (Done)     Learning Progress Summary           Patient Acceptance, E, VU,DU,NR by RAE at 8/11/2021 0857    Comment: benefits of activity, progression of care, sternal prec                               User Key     Initials Effective Dates Name Provider Type Discipline    RAE 08/02/16 -  Melvin Bro, PT DPT Physical Therapist PT              PT Recommendation and Plan     Plan of Care Reviewed With: patient  Progress:  improving  Outcome Summary: Pt was cga to stand and to walk 200ft on room air. Pt was 93% after being on room air 3 min and during gait 02 dropped to 91%. PT will continue to progress patient.       Time Calculation:   PT Charges     Row Name 08/12/21 0959             Time Calculation    Start Time  0905  -AE      Stop Time  0943  -AE      Time Calculation (min)  38 min  -AE      PT Received On  08/12/21  -AE      PT Goal Re-Cert Due Date  08/21/21  -AE         Time Calculation- PT    Total Timed Code Minutes- PT  38 minute(s)  -AE         Timed Charges    38823 - Gait Training Minutes   30  -AE      92087 - PT Therapeutic Activity Minutes  8  -AE         Total Minutes    Timed Charges Total Minutes  38  -AE       Total Minutes  38  -AE        User Key  (r) = Recorded By, (t) = Taken By, (c) = Cosigned By    Initials Name Provider Type    AE Alessandra Joyner PTA Physical Therapy Assistant        Therapy Charges for Today     Code Description Service Date Service Provider Modifiers Qty    94842369403 HC GAIT TRAINING EA 15 MIN 8/12/2021 Alessandra Joyner PTA GP 2    22015276138 HC PT THERAPEUTIC ACT EA 15 MIN 8/12/2021 Alessandra Joyner PTA GP 1          PT G-Codes  Outcome Measure Options: AM-PAC 6 Clicks Basic Mobility (PT)  AM-PAC 6 Clicks Score (PT): 15    Alessandra Joyner PTA  8/12/2021

## 2021-08-13 ENCOUNTER — APPOINTMENT (OUTPATIENT)
Dept: GENERAL RADIOLOGY | Facility: HOSPITAL | Age: 61
End: 2021-08-13

## 2021-08-13 VITALS
HEART RATE: 90 BPM | RESPIRATION RATE: 16 BRPM | WEIGHT: 187.5 LBS | OXYGEN SATURATION: 97 % | HEIGHT: 67 IN | BODY MASS INDEX: 29.43 KG/M2 | DIASTOLIC BLOOD PRESSURE: 67 MMHG | TEMPERATURE: 98.4 F | SYSTOLIC BLOOD PRESSURE: 127 MMHG

## 2021-08-13 LAB
ANION GAP SERPL CALCULATED.3IONS-SCNC: 7 MMOL/L (ref 5–15)
BUN SERPL-MCNC: 11 MG/DL (ref 6–20)
BUN/CREAT SERPL: 15.1 (ref 7–25)
CALCIUM SPEC-SCNC: 8.9 MG/DL (ref 8.6–10.5)
CHLORIDE SERPL-SCNC: 105 MMOL/L (ref 98–107)
CO2 SERPL-SCNC: 26 MMOL/L (ref 22–29)
CREAT SERPL-MCNC: 0.73 MG/DL (ref 0.76–1.27)
DEPRECATED RDW RBC AUTO: 42.2 FL (ref 37–54)
ERYTHROCYTE [DISTWIDTH] IN BLOOD BY AUTOMATED COUNT: 12.2 % (ref 12.3–15.4)
GFR SERPL CREATININE-BSD FRML MDRD: 110 ML/MIN/1.73
GLUCOSE SERPL-MCNC: 119 MG/DL (ref 65–99)
HCT VFR BLD AUTO: 25.8 % (ref 37.5–51)
HGB BLD-MCNC: 8.8 G/DL (ref 13–17.7)
MCH RBC QN AUTO: 32.2 PG (ref 26.6–33)
MCHC RBC AUTO-ENTMCNC: 34.1 G/DL (ref 31.5–35.7)
MCV RBC AUTO: 94.5 FL (ref 79–97)
PLATELET # BLD AUTO: 131 10*3/MM3 (ref 140–450)
PMV BLD AUTO: 10.3 FL (ref 6–12)
POTASSIUM SERPL-SCNC: 4 MMOL/L (ref 3.5–5.2)
RBC # BLD AUTO: 2.73 10*6/MM3 (ref 4.14–5.8)
SODIUM SERPL-SCNC: 138 MMOL/L (ref 136–145)
WBC # BLD AUTO: 7.74 10*3/MM3 (ref 3.4–10.8)

## 2021-08-13 PROCEDURE — 25010000002 ENOXAPARIN PER 10 MG: Performed by: NURSE PRACTITIONER

## 2021-08-13 PROCEDURE — 25010000002 FUROSEMIDE PER 20 MG: Performed by: NURSE PRACTITIONER

## 2021-08-13 PROCEDURE — 85027 COMPLETE CBC AUTOMATED: CPT | Performed by: NURSE PRACTITIONER

## 2021-08-13 PROCEDURE — 71046 X-RAY EXAM CHEST 2 VIEWS: CPT

## 2021-08-13 PROCEDURE — 80048 BASIC METABOLIC PNL TOTAL CA: CPT | Performed by: NURSE PRACTITIONER

## 2021-08-13 PROCEDURE — 97116 GAIT TRAINING THERAPY: CPT

## 2021-08-13 RX ORDER — CLOPIDOGREL BISULFATE 75 MG/1
75 TABLET ORAL DAILY
Qty: 30 TABLET | Refills: 2 | Status: SHIPPED | OUTPATIENT
Start: 2021-08-13 | End: 2021-11-18 | Stop reason: SDUPTHER

## 2021-08-13 RX ORDER — HYDROCODONE BITARTRATE AND ACETAMINOPHEN 10; 325 MG/1; MG/1
1 TABLET ORAL EVERY 4 HOURS PRN
Status: DISCONTINUED | OUTPATIENT
Start: 2021-08-13 | End: 2021-08-13 | Stop reason: HOSPADM

## 2021-08-13 RX ORDER — RAMIPRIL 1.25 MG/1
1.25 CAPSULE ORAL
Status: DISCONTINUED | OUTPATIENT
Start: 2021-08-13 | End: 2021-08-13 | Stop reason: HOSPADM

## 2021-08-13 RX ORDER — FUROSEMIDE 10 MG/ML
20 INJECTION INTRAMUSCULAR; INTRAVENOUS
Status: DISCONTINUED | OUTPATIENT
Start: 2021-08-13 | End: 2021-08-13 | Stop reason: HOSPADM

## 2021-08-13 RX ORDER — AMIODARONE HYDROCHLORIDE 200 MG/1
400 TABLET ORAL
Status: DISCONTINUED | OUTPATIENT
Start: 2021-08-14 | End: 2021-08-13 | Stop reason: HOSPADM

## 2021-08-13 RX ORDER — FUROSEMIDE 40 MG/1
40 TABLET ORAL DAILY
Qty: 7 TABLET | Refills: 0 | Status: SHIPPED | OUTPATIENT
Start: 2021-08-13 | End: 2022-10-05

## 2021-08-13 RX ORDER — ATORVASTATIN CALCIUM 40 MG/1
40 TABLET, FILM COATED ORAL NIGHTLY
Qty: 30 TABLET | Refills: 2 | Status: ON HOLD | OUTPATIENT
Start: 2021-08-13 | End: 2022-10-11

## 2021-08-13 RX ORDER — HYDROCODONE BITARTRATE AND ACETAMINOPHEN 5; 325 MG/1; MG/1
1 TABLET ORAL EVERY 6 HOURS PRN
Qty: 20 TABLET | Refills: 0 | Status: SHIPPED | OUTPATIENT
Start: 2021-08-13 | End: 2021-08-18

## 2021-08-13 RX ORDER — POTASSIUM CHLORIDE 750 MG/1
20 CAPSULE, EXTENDED RELEASE ORAL
Status: DISCONTINUED | OUTPATIENT
Start: 2021-08-13 | End: 2021-08-13 | Stop reason: HOSPADM

## 2021-08-13 RX ORDER — RAMIPRIL 10 MG/1
10 CAPSULE ORAL DAILY
Status: CANCELLED | OUTPATIENT
Start: 2021-08-13

## 2021-08-13 RX ORDER — POTASSIUM CHLORIDE 750 MG/1
20 CAPSULE, EXTENDED RELEASE ORAL DAILY
Qty: 14 CAPSULE | Refills: 0 | Status: SHIPPED | OUTPATIENT
Start: 2021-08-13 | End: 2021-08-20

## 2021-08-13 RX ORDER — RAMIPRIL 1.25 MG/1
1.25 CAPSULE ORAL DAILY
Qty: 30 CAPSULE | Refills: 0 | Status: ON HOLD | OUTPATIENT
Start: 2021-08-13 | End: 2022-10-12

## 2021-08-13 RX ADMIN — FUROSEMIDE 20 MG: 10 INJECTION, SOLUTION INTRAMUSCULAR; INTRAVENOUS at 13:04

## 2021-08-13 RX ADMIN — METOPROLOL TARTRATE 25 MG: 25 TABLET, FILM COATED ORAL at 09:34

## 2021-08-13 RX ADMIN — ENOXAPARIN SODIUM 30 MG: 30 INJECTION SUBCUTANEOUS at 09:32

## 2021-08-13 RX ADMIN — FUROSEMIDE 20 MG: 10 INJECTION, SOLUTION INTRAMUSCULAR; INTRAVENOUS at 09:32

## 2021-08-13 RX ADMIN — ACETAMINOPHEN 650 MG: 325 TABLET, FILM COATED ORAL at 06:20

## 2021-08-13 RX ADMIN — PANTOPRAZOLE SODIUM 40 MG: 40 TABLET, DELAYED RELEASE ORAL at 06:20

## 2021-08-13 RX ADMIN — RAMIPRIL 1.25 MG: 1.25 CAPSULE ORAL at 15:41

## 2021-08-13 RX ADMIN — PREGABALIN 25 MG: 25 CAPSULE ORAL at 09:32

## 2021-08-13 RX ADMIN — ASPIRIN 81 MG: 81 TABLET, COATED ORAL at 09:32

## 2021-08-13 RX ADMIN — ACETAMINOPHEN 650 MG: 325 TABLET, FILM COATED ORAL at 13:11

## 2021-08-13 RX ADMIN — CLOPIDOGREL 75 MG: 75 TABLET, FILM COATED ORAL at 17:44

## 2021-08-13 RX ADMIN — AMIODARONE HYDROCHLORIDE 400 MG: 200 TABLET ORAL at 09:32

## 2021-08-13 RX ADMIN — BISACODYL 10 MG: 5 TABLET ORAL at 09:36

## 2021-08-13 RX ADMIN — POLYETHYLENE GLYCOL 3350 17 G: 17 POWDER, FOR SOLUTION ORAL at 09:37

## 2021-08-13 RX ADMIN — POTASSIUM CHLORIDE 20 MEQ: 10 CAPSULE, COATED, EXTENDED RELEASE ORAL at 09:32

## 2021-08-13 RX ADMIN — POTASSIUM CHLORIDE 20 MEQ: 10 CAPSULE, COATED, EXTENDED RELEASE ORAL at 13:04

## 2021-08-13 NOTE — THERAPY TREATMENT NOTE
Acute Care - Physical Therapy Treatment Note  [facility]     Patient Name: Logan Mayen  : 1960  MRN: [id number]  Today's Date: 2021      Visit Dx:     ICD-10-CM ICD-9-CM   1. ST elevation myocardial infarction (STEMI), unspecified artery (CMS/HCC)  I21.3 410.90   2. Myocardial infarction (CMS/HCC)  I21.9 410.90   3. Impaired mobility  Z74.09 799.89     Patient Active Problem List   Diagnosis   • Ileus (CMS/HCC)   • Colitis   • Coronary artery disease involving native coronary artery of native heart   • GERD (gastroesophageal reflux disease)   • Ruptured appendicitis   • STEMI (ST elevation myocardial infarction) (CMS/[facility])   • Hypertension   • Hyperlipidemia   • Alcohol use   • Overweight (BMI 25.0-29.9)   • Marijuana use     Past Medical History:   Diagnosis Date   • Coronary artery disease    • Hyperlipidemia    • Hypertension    • Myocardial infarction (CMS/HCC)      Past Surgical History:   Procedure Laterality Date   • CARDIAC CATHETERIZATION     • CARDIAC CATHETERIZATION N/A 8/10/2021    Procedure: Left Heart Cath;  Surgeon: Alessio Chung MD;  Location: [facility] CATH INVASIVE LOCATION;  Service: Cardiovascular;  Laterality: N/A;   • CATARACT EXTRACTION, BILATERAL     • CORONARY ARTERY BYPASS GRAFT N/A 8/10/2021    Procedure: CORONARY ARTERY BYPASS GRAFT X 4 WITH LEFT INTERNAL MAMMARY ARTERY, RIGHT LOWER EXTREMITY ENDOSCOPIC VEIN HARVEST, BILATERAL LOWER EXTREMITY OPEN VEIN HARVEST, AND PERFUSION; TRANSESOPHAGEAL ECHOCARDIOGRAM;  Surgeon: Zana Hernandez MD;  Location: [facility] OR;  Service: Cardiothoracic;  Laterality: N/A;   • CORONARY STENT PLACEMENT     • PERIPHERAL ARTERIAL STENT GRAFT          PT Assessment (last 12 hours)      PT Evaluation and Treatment     Row Name 21 0826          Physical Therapy Time and Intention    Subjective Information  no complaints  -MF     Document Type  therapy note (daily note)  -MF     Mode of Treatment  physical therapy  -     Row Name 21  0826          General Information    Existing Precautions/Restrictions  fall;sternal chest tube  -     Row Name 08/13/21 0826          Pain Scale: Word Pre/Post-Treatment    Pain: Word Scale, Pretreatment  0 - no pain  -     Posttreatment Pain Rating  0 - no pain  -     Row Name 08/13/21 0826          Bed Mobility    Supine-Sit [place] (Bed Mobility)  verbal cues;contact guard;minimum assist (75% patient effort)  -     Assistive Device (Bed Mobility)  head of bed elevated  -     Row Name 08/13/21 0826          Transfers    Sit-Stand [place] (Transfers)  supervision  -     Stand-Sit [place] (Transfers)  supervision  -     [place] Level (Toilet Transfer)  supervision  -     Assistive Device (Toilet Transfer)  commode  -     Row Name 08/13/21 0826          Toilet Transfer    Type (Toilet Transfer)  sit-stand;stand-sit  -     Row Name 08/13/21 0826          Gait/Stairs (Locomotion)    [place] Level (Gait)  supervision  -     Distance in Feet (Gait)  450  -     Row Name 08/13/21 0826          Aerobic Exercise    Comment, Aerobic Exercise (Therapeutic Exercise)  Cardiac protcol cool down  -     Row Name             Wound 08/10/21 1446 sternal Incision    Wound - Properties Group Placement Date: 08/10/21  -DS Placement Time: 1446  -DS Location: sternal  -DS Primary Wound Type: Incision  -DS    Retired Wound - Properties Group Date first assessed: 08/10/21  -DS Time first assessed: 1446  -DS Location: sternal  -DS Primary Wound Type: Incision  -DS    Row Name             Wound 08/10/21 1702 Right leg Incision    Wound - Properties Group Placement Date: 08/10/21  -KR Placement Time: 1702  -KR Present on Hospital Admission: N  -KR Side: Right  -KR Location: leg  -KR Primary Wound Type: Incision  -KR    Retired Wound - Properties Group Date first assessed: 08/10/21  -KR Time first assessed: 1702  -KR Present on Hospital Admission: N  -KR Side: Right  -KR Location: leg  -KR  Primary Wound Type: Incision  -KR    Row Name             Wound 08/10/21 1702 Left leg Incision    Wound - Properties Group Placement Date: 08/10/21  -KR Placement Time: 1702  -KR Present on Hospital Admission: N  -KR Side: Left  -KR Location: leg  -KR Primary Wound Type: Incision  -KR    Retired Wound - Properties Group Date first assessed: 08/10/21  -KR Time first assessed: 1702  -KR Present on Hospital Admission: N  -KR Side: Left  -KR Location: leg  -KR Primary Wound Type: Incision  -KR    Row Name 08/13/21 0826          Plan of Care Review    Plan of Care Reviewed With  patient  -MF     Progress  improving  -     Outcome Summary  Pt. progressing well with therapy. Pt. still required CGA-MIN assist for bed mobility. He was Supervision for all other mobility. He was able to walk 450' without difficulty. Pt's O2 sat was 93% on RA during activity. Will continue to work on independence.   -     Row Name 08/13/21 0826          Vital Signs    Intra SpO2 (%)  93  -     O2 Delivery Intra Treatment  room air  -     Intra Patient Position  Standing  -     Row Name 08/13/21 0826          Positioning and Restraints    Pre-Treatment Position  in bed  -     Post Treatment Position  chair  -MF     In Chair  sitting;call light within reach;encouraged to call for assist  -       User Key  (r) = Recorded By, (t) = Taken By, (c) = Cosigned By    Initials Name Provider Type    Paul Corbin, RN Registered Nurse    Yakelin Vazquez, PTA Physical Therapy Assistant    To Gonzalez --        Physical Therapy Education                 Title: PT OT SLP Therapies (In Progress)     Topic: Physical Therapy (In Progress)     Point: Mobility training (Done)     Learning Progress Summary           Patient Acceptance, E, VU,DU,NR by RAE at 8/11/2021 0858    Comment: benefits of activity, progression of care, sternal prec                   Point: Home exercise program (Not Started)     Learner Progress:  Not  documented in this visit.          Point: Body mechanics (Not Started)     Learner Progress:  Not documented in this visit.          Point: Precautions (Done)     Learning Progress Summary           Patient Acceptance, E, NEMESIO,DU,NR by RAE at 8/11/2021 0857    Comment: benefits of activity, progression of care, sternal prec                               User Key     Initials Effective Dates Name Provider Type Discipline    RAE 08/02/16 -  Melvin Bro, PT DPT Physical Therapist PT              PT Recommendation and Plan     Plan of Care Reviewed With: patient  Progress: improving  Outcome Summary: Pt. progressing well with therapy. Pt. still required CGA-MIN assist for bed mobility. He was Supervision for all other mobility. He was able to walk 450' without difficulty. Pt's O2 sat was 93% on RA during activity. Will continue to work on independence.        Time Calculation:   PT Charges     Row Name 08/13/21 0928             Time Calculation    Start Time  0826  -MF      Stop Time  0849  -MF      Time Calculation (min)  23 min  -MF      PT Received On  08/13/21  -MF         Time Calculation- PT    Total Timed Code Minutes- PT  23 minute(s)  -MF         Timed Charges    66676 - Gait Training Minutes   23  -MF         Total Minutes    Timed Charges Total Minutes  23  -MF       Total Minutes  23  -MF        User Key  (r) = Recorded By, (t) = Taken By, (c) = Cosigned By    Initials Name Provider Type     Yakelin House PTA Physical Therapy Assistant        Therapy Charges for Today     Code Description Service Date Service Provider Modifiers Qty    78855841427 HC GAIT TRAINING EA 15 MIN 8/13/2021 Yakelin House PTA GP 2          PT G-Codes  Outcome Measure Options: AM-PAC 6 Clicks Basic Mobility (PT)  AM-PAC 6 Clicks Score (PT): 15    Yakelin House PTA  8/13/2021

## 2021-08-13 NOTE — CASE MANAGEMENT/SOCIAL WORK
Continued Stay Note   [place]     Patient Name: Logan Mayen  MRN: [id number]  Today's Date: 8/13/2021    Admit Date: 8/10/2021    Discharge Plan     Row Name 08/13/21 1155       Plan    Plan  HOME    Patient/Family in Agreement with Plan  yes    Plan Comments  REVIEWED CHART; EVENTS NOTED.  PT. PLANS TO RETURN HOME AND DOES NOT IDENTIFY ANY D/C PLANNING NEEDS AT PRESENT, HOWEVER WILL CONT. TO FOLLOW FOR ANY THAT MAY ARISE.        Discharge Codes    No documentation.             ARSLAN Rivera

## 2021-08-13 NOTE — PLAN OF CARE
Goal Outcome Evaluation:  Plan of Care Reviewed With: patient, significant other        Progress: improving  Outcome Summary: Very pleasant and cooperative pt, VSS, NSR, All incisions have scant drainage, new DSG placed on bilateral EVH, MS have very scant drainage at the base, Ambulate x3 on RA without difficulty, all pain is relieved with prn Terrell Tylenol, Outout adequate since FC dc, BM+x3, Pt could potentially dc 8/13, continue to monitor

## 2021-08-13 NOTE — PROGRESS NOTES
"Coronary artery bypass grafting -4 vessel, right lower extremity endoscopic vein harvest, bilateral lower extremity open vein harvest    POD 3    Up in the chair. Feeling better. On room air with O2 sat 93%. (+) BM. Walking 450 feet with PT. Weight down 8 pounds in last day; remains 15 pounds above baseline.     Telemetry: sinus 80s    Visit Vitals  /77 (BP Location: Left arm, Patient Position: Sitting)   Pulse 85   Temp 98 °F (36.7 °C) (Oral)   Resp 16   Ht 170.2 cm (67.01\")   Wt 88 kg (193 lb 14.4 oz)   SpO2 97%   BMI 30.36 kg/m²     Pre operative weight 178 pounds     Intake/Output Summary (Last 24 hours) at 8/13/2021 1200  Last data filed at 8/13/2021 1013  Gross per 24 hour   Intake 650 ml   Output 4390 ml   Net -3740 ml     Mediastinal tube output: 100 ml/24 hours   Left Marc drain: 45 ml/24 hours     Labs:      Chest x-ray: no pneumothorax, improving atelectasis, small bilateral pleural effusions    Physical Exam:  General: No apparent distress. In good spirits. Up in the chair.   Cardiovascular: Regular rate and rhythm without murmur, rubs, or gallops.    Pulmonary: Clear to auscultation bilaterally without wheezing, rubs, or rales.  Chest: Sternotomy incision clean, dry, and intact. Sternum stable. No clicks. Mediastinal tubes x 2 and Marc drain x 1 with dressing clean, dry, and intact.    Abdomen: Soft, nondistended and nontender.  Extremities: Warm, moves all extremities. Bilateral saphenectomy sites clean, dry, and intact. Edematous extremities.   Neurologic: Grossly intact with no focal deficits.       Impression:  STEMI   Coronary artery disease involving native coronary artery of native heart  Previous coronary artery stent placement post myocardial infarction   GERD (gastroesophageal reflux disease)  Hypertension  Hyperlipidemia  Alcohol use  Improved pain control    Plan:  Mediastinal and pleural tubes removed  Continue diuresis   Encourage pulmonary toilet and ambulation   Routine post " cardiac surgery regimen  Anticipate discharge home soon   DW patient and nursing

## 2021-08-13 NOTE — DISCHARGE SUMMARY
[facility] Cardiothoracic Surgery  DISCHARGE SUMMARY        Date of Admission: 8/10/2021  Date of Discharge:  8/13/2021  Primary Care Physician: Logan Bryant MD    Discharge Diagnoses:  Active Hospital Problems    Diagnosis    • **STEMI (ST elevation myocardial infarction) (CMS/[facility])    • Overweight (BMI 25.0-29.9)    • Marijuana use    • Hypertension    • Hyperlipidemia    • Alcohol use    • Coronary artery disease involving native coronary artery of native heart    • GERD (gastroesophageal reflux disease)      Procedures Performed: Emergency coronary artery bypass grafting -4 vessel, right lower extremity endoscopic vein harvest, bilateral lower extremity open vein harvest performed on 8/10/2021 by Dr. Hernandez.     HPI:  Mr. Logan Mayen is a 60 y.o. male  is a 60 year old male with a pertinent past medical history of coronary artery disease with stent placement x 4 in 1999 by Dr. Jj following MI, hypertension, hyperlipidemia, and GERD.  He presented to the ER early this morning with complaints of constant chest pressure for the past 3 days.  He reported that he was hiking with friends and felt a burning in his chest and ache in the left arm.  Symptoms did not resolve.  He denies any alleviating or exacerbating factors.  He denies lower extremity edema, syncope, or shortness of breath.  Upon arrival to the ER workup ensued revealing troponin 0.333 and ST changed on EKD.  Code STEMI was called and Dr. Chung evaluated the patient.  He was taken to the cath lab this morning revealing 70-80% stenosis of the LAD with 70% stenosis of proximal diagonal, distally occluded LCX that was treated with PTCA with extablishment of WILLIAM-3 flow, and RCA with chronic total occlusion.  Cardiothoracic surgery has been consulted for consideration of CABG.  The patient is currently resting in bed with no complaints of pain.  Integrilin and Angiomax infusing.  He reports that he drinks approximately  every 2-3 days 7-8 beers and sometimes whiskey.  He states he does not do this all the time and can go months without drinking.  He is a never smoker.  He denies history of stroke, kidney disease, liver disease, and diabetes mellitus. His ventricular function is diminished at approximate 45% ejection fraction with mitral valve regurgitation noted.  His hemodynamics are stable. Discussed the options for treatment which would include medical management versus PCI versus bypass grafting.  We discussed the potential option of PCI to the culprit vessel and then return in interval fashion to consider coronary artery bypass grafting for remaining vessels.  He has significant myocardium at risk given an occluded right coronary artery system with a left dominant system having high-grade first obtuse marginal stenosis as well as a stenosis of the terminal obtused.  His LAD has significant stenosis as well.  Emergency coronary artery bypass grafting is advised and the patient is agreeable to proceed.     Hospital Course: Please see a separate op note by Dr. Hernandez from 8/10/2021. Following surgery, he was transferred to the ICU in stable guarded condition. He remained hemodynamically stable. He was extubated overnight and demonstrated a neurologically intact exam. He was up to the chair and ambulating around the unit. He was kept in the ICU overnight for close watch. He received 1 unit of blood on post op day 2 for hemoglobin 7.1 and hematocrit 20. He was transferred to [facility] for continued recovery. Mediastinal and pleural tubes were removed on post op day 3 without remark. The remaining stay of his hospitalization was remarkable for diuresis, encouraging pulmonary toilet and ambulation. He is saturating appropriately on room air. Pacing wires were removed on post op day 3 and he meets criteria for discharge home on post op day 3. He is agreeable for discharge home with family.     Condition on Discharge:  Neurologically intact  and has good pain control.  He is eating well and has demonstrable good bowel function.  The sternum is stable without clicks and the saphenectomy incisions are healing nicely.  The heart is in normal sinus rhythm.  He has met all physical therapy criteria and verbalizes understanding of sternotomy precautions.   He verbalizes understanding of a separately handed out cardiac surgery handout.       Discharge Disposition:  Home     Discharge Medications:     Discharge Medications      New Medications      Instructions Start Date   atorvastatin 40 MG tablet  Commonly known as: LIPITOR   40 mg, Oral, Nightly      clopidogrel 75 MG tablet  Commonly known as: PLAVIX   75 mg, Oral, Daily      furosemide 40 MG tablet  Commonly known as: Lasix   40 mg, Oral, Daily      HYDROcodone-acetaminophen 5-325 MG per tablet  Commonly known as: NORCO   1 tablet, Oral, Every 6 Hours PRN      metoprolol tartrate 25 MG tablet  Commonly known as: LOPRESSOR   25 mg, Oral, 2 Times Daily      potassium chloride 10 MEQ CR capsule  Commonly known as: MICRO-K   20 mEq, Oral, Daily         Changes to Medications      Instructions Start Date   ramipril 1.25 MG capsule  Commonly known as: ALTACE  What changed:   · medication strength  · how much to take   1.25 mg, Oral, Daily         Continue These Medications      Instructions Start Date   aspirin 81 MG chewable tablet   81 mg, Oral, Daily      Garlic 100 MG tablet   400 mg, Oral, Daily      Krill Oil 350 MG capsule   2 tablets, Oral, Daily      sildenafil 20 MG tablet  Commonly known as: REVATIO   40 mg, Oral, Daily PRN, Take 2 tablets daily as needed for erectile dysfunction.      Turmeric 500 MG capsule   2 tablets, Oral, Daily      vitamin D3 125 MCG (5000 UT) capsule capsule   5,000 Units, Oral, Daily         Stop These Medications    carvedilol 3.125 MG tablet  Commonly known as: COREG        Discharge Diet: Regular diet     Discharge Care Plan / Instructions: Please see the separately  handed out cardiac surgery handout. Cardiac rehab deferred at this time due to sternotomy precautions-will reassess at formal office visit.     Activity at Discharge:   No heavy lifting greater than 5 pounds or a gallon of milk while maintaining sternal precautions.  Logan Mayen has been instructed on an exercise  regimen as detailed in a handed out cardiac surgery handout.    Tobacco:  The patient does not use tobacco products and therefore does not need tobacco cessation education.    BMI: Patient's Body mass index is 29.36 kg/m². indicating that he is obese (BMI >30). Obesity-related health conditions include the following: hypertension, coronary heart disease, dyslipidemias and GERD. Obesity is unchanged. BMI is is above average; BMI management plan is completed. We discussed portion control and increasing exercise.    Follow-up Appointments: Logan Mayen  is requested to see Logan Bryant MD within 1-2 weeks from time of discharge, to see Viry SIDDIQI in 1 week, to follow-up with Dr. Hernandez in 4 weeks,  and to follow-up with Dr. Chung of the cardiology service in 6 weeks.

## 2021-08-13 NOTE — PLAN OF CARE
Goal Outcome Evaluation:  Plan of Care Reviewed With: patient        Progress: improving  Outcome Summary: Patient had no complaints of pain this shift.  Walked one time before bed time to 4c and back without any difficulty.  Has slept so far this shift.  NSR on tele.  Incisions well approximated.  Left leg distal part of incision has some drainage.  Changed the dressing one time.  Patient does not attempt to get up unassisted and uses call light.  Patient is steady on feet and is just standby assist.  He is no longer a fall risk.

## 2021-08-13 NOTE — PLAN OF CARE
Goal Outcome Evaluation:  Plan of Care Reviewed With: patient        Progress: improving  Pt. Is Independent with bed mobility, transfers, gait, and stair climbing. Pt. Was given the ok to ambulate on his own. His O2 sat was 99% on RA. Reviewed sternal precautions and progression of activity. Pt. Is hoping to be discharged home tomorrow.

## 2021-08-13 NOTE — THERAPY TREATMENT NOTE
Acute Care - Physical Therapy Treatment Note  [facility]     Patient Name: Logan Mayen  : 1960  MRN: [id number]  Today's Date: 2021      Visit Dx:     ICD-10-CM ICD-9-CM   1. ST elevation myocardial infarction (STEMI), unspecified artery (CMS/HCC)  I21.3 410.90   2. Myocardial infarction (CMS/HCC)  I21.9 410.90   3. Impaired mobility  Z74.09 799.89     Patient Active Problem List   Diagnosis   • Ileus (CMS/HCC)   • Colitis   • Coronary artery disease involving native coronary artery of native heart   • GERD (gastroesophageal reflux disease)   • Ruptured appendicitis   • STEMI (ST elevation myocardial infarction) (CMS/[facility])   • Hypertension   • Hyperlipidemia   • Alcohol use   • Overweight (BMI 25.0-29.9)   • Marijuana use     Past Medical History:   Diagnosis Date   • Coronary artery disease    • Hyperlipidemia    • Hypertension    • Myocardial infarction (CMS/HCC)      Past Surgical History:   Procedure Laterality Date   • CARDIAC CATHETERIZATION     • CARDIAC CATHETERIZATION N/A 8/10/2021    Procedure: Left Heart Cath;  Surgeon: Alessio Chung MD;  Location: [facility] CATH INVASIVE LOCATION;  Service: Cardiovascular;  Laterality: N/A;   • CATARACT EXTRACTION, BILATERAL     • CORONARY ARTERY BYPASS GRAFT N/A 8/10/2021    Procedure: CORONARY ARTERY BYPASS GRAFT X 4 WITH LEFT INTERNAL MAMMARY ARTERY, RIGHT LOWER EXTREMITY ENDOSCOPIC VEIN HARVEST, BILATERAL LOWER EXTREMITY OPEN VEIN HARVEST, AND PERFUSION; TRANSESOPHAGEAL ECHOCARDIOGRAM;  Surgeon: Zana Hernandez MD;  Location: [facility] OR;  Service: Cardiothoracic;  Laterality: N/A;   • CORONARY STENT PLACEMENT     • PERIPHERAL ARTERIAL STENT GRAFT          PT Assessment (last 12 hours)      PT Evaluation and Treatment     Row Name 21 1435 21 1407       Physical Therapy Time and Intention    Subjective Information  no complaints  -MF  --    Document Type  therapy note (daily note)  -MF  therapy note (daily note)  -MF    Mode of Treatment   physical therapy  -  --    Session Not Performed  --  other (see comments)  -    Comment, Session Not Performed  --  pt sleeping. Will check back.   -    Comment  Reviewed sternal precautions and progression of activity  -  --    Row Name 08/13/21 1334 08/13/21 0826       Physical Therapy Time and Intention    Subjective Information  --  no complaints  -    Document Type  --  -  therapy note (daily note)  -    Mode of Treatment  --  physical therapy  -    Row Name 08/13/21 1435 08/13/21 0826       General Information    Existing Precautions/Restrictions  fall;sternal  -MF  fall;sternal chest tube  -    Row Name 08/13/21 0826          Pain Scale: Word Pre/Post-Treatment    Pain: Word Scale, Pretreatment  0 - no pain  -     Posttreatment Pain Rating  0 - no pain  -     Row Name 08/13/21 1435          Pain Scale: FACES Pre/Post-Treatment    Pain: FACES Scale, Pretreatment  0-->no hurt  -     Posttreatment Pain Rating  0-->no hurt  -     Row Name 08/13/21 1435 08/13/21 0826       Bed Mobility    Supine-Sit [place] (Bed Mobility)  independent  -  verbal cues;contact guard;minimum assist (75% patient effort)  -    Sit-Supine [place] (Bed Mobility)  independent  -  --    Assistive Device (Bed Mobility)  --  head of bed elevated  -    Row Name 08/13/21 1435 08/13/21 0826       Transfers    Sit-Stand [place] (Transfers)  independent  -  supervision  -    Stand-Sit [place] (Transfers)  independent  -  supervision  -    [place] Level (Toilet Transfer)  independent  -  supervision  -    Assistive Device (Toilet Transfer)  commode  -  commode  -    Row Name 08/13/21 1435 08/13/21 0826       Toilet Transfer    Type (Toilet Transfer)  sit-stand;stand-sit  -  sit-stand;stand-sit  -    Row Name 08/13/21 1435 08/13/21 0826       Gait/Stairs (Locomotion)    [place] Level (Gait)  independent  -  supervision  -    Distance in Feet (Gait)  550  -   450  -MF    [place] Level (Stairs)  independent  -MF  --    Handrail Location (Stairs)  none  -MF  --    Number of Steps (Stairs)  9  -MF  --    Ascending Technique (Stairs)  step-over-step  -MF  --    Descending Technique (Stairs)  step-over-step  -MF  --    Row Name 08/13/21 0826          Aerobic Exercise    Comment, Aerobic Exercise (Therapeutic Exercise)  Cardiac protcol cool down  -MF     Row Name             Wound 08/10/21 1446 sternal Incision    Wound - Properties Group Placement Date: 08/10/21  -DS Placement Time: 1446 -DS Location: sternal  -DS Primary Wound Type: Incision  -DS    Retired Wound - Properties Group Date first assessed: 08/10/21  -DS Time first assessed: 1446 -DS Location: sternal  -DS Primary Wound Type: Incision  -DS    Row Name             Wound 08/10/21 1702 Right leg Incision    Wound - Properties Group Placement Date: 08/10/21  -KR Placement Time: 1702  -KR Present on Hospital Admission: N  -KR Side: Right  -KR Location: leg  -KR Primary Wound Type: Incision  -KR    Retired Wound - Properties Group Date first assessed: 08/10/21  -KR Time first assessed: 1702  -KR Present on Hospital Admission: N  -KR Side: Right  -KR Location: leg  -KR Primary Wound Type: Incision  -KR    Row Name             Wound 08/10/21 1702 Left leg Incision    Wound - Properties Group Placement Date: 08/10/21  -KR Placement Time: 1702  -KR Present on Hospital Admission: N  -KR Side: Left  -KR Location: leg  -KR Primary Wound Type: Incision  -KR    Retired Wound - Properties Group Date first assessed: 08/10/21  -KR Time first assessed: 1702  -KR Present on Hospital Admission: N  -KR Side: Left  -KR Location: leg  -KR Primary Wound Type: Incision  -KR    Row Name 08/13/21 1435 08/13/21 0826       Plan of Care Review    Plan of Care Reviewed With  patient  -MF  patient  -MF    Progress  improving  -MF  improving  -MF    Outcome Summary  Pt. is Independent with bed mobility, transfers, gait, and stair climbing.  Pt. was given the ok to ambulate on his own. His O2 sat was 99% on RA. Reviewed sternal precautions and progression of activity. Pt. is hoping to be discharged home today.   -  Pt. progressing well with therapy. Pt. still required CGA-MIN assist for bed mobility. He was Supervision for all other mobility. He was able to walk 450' without difficulty. Pt's O2 sat was 93% on RA during activity. Will continue to work on independence.   -    Row Name 08/13/21 1435 08/13/21 0826       Vital Signs    Intra SpO2 (%)  --  93  -MF    O2 Delivery Intra Treatment  --  room air  -MF    Post SpO2 (%)  99  -MF  --    O2 Delivery Post Treatment  room air  -  --    Intra Patient Position  --  Standing  -    Row Name 08/13/21 1435 08/13/21 0826       Positioning and Restraints    Pre-Treatment Position  bathroom  -MF  in bed  -MF    Post Treatment Position  bed  -MF  chair  -MF    In Bed  fowlers;call light within reach;encouraged to call for assist;side rails up x2  -MF  --    In Chair  --  sitting;call light within reach;encouraged to call for assist  -MF      User Key  (r) = Recorded By, (t) = Taken By, (c) = Cosigned By    Initials Name Provider Type    Paul Corbin, RN Registered Nurse    Yakelin Vazquez, PTA Physical Therapy Assistant    To Gonzalez --        Physical Therapy Education                 Title: PT OT SLP Therapies (In Progress)     Topic: Physical Therapy (In Progress)     Point: Mobility training (Done)     Learning Progress Summary           Patient Acceptance, E, JUDITH HERR,NR by RAE at 8/11/2021 0857    Comment: benefits of activity, progression of care, sternal prec                   Point: Home exercise program (Not Started)     Learner Progress:  Not documented in this visit.          Point: Body mechanics (Not Started)     Learner Progress:  Not documented in this visit.          Point: Precautions (Done)     Learning Progress Summary           Patient Acceptance, E, NEMESIO,JUDITH,NR by RAE at  8/11/2021 0857    Comment: benefits of activity, progression of care, sternal prec                               User Key     Initials Effective Dates Name Provider Type Discipline    RAE 08/02/16 -  Melvin Bro, PT DPT Physical Therapist PT              PT Recommendation and Plan     Plan of Care Reviewed With: patient  Progress: improving  Outcome Summary: Pt. is Independent with bed mobility, transfers, gait, and stair climbing. Pt. was given the ok to ambulate on his own. His O2 sat was 99% on RA. Reviewed sternal precautions and progression of activity. Pt. is hoping to be discharged home today.        Time Calculation:   PT Charges     Row Name 08/13/21 1502 08/13/21 0928          Time Calculation    Start Time  1435  -MF  0826  -MF     Stop Time  1458  -MF  0849  -MF     Time Calculation (min)  23 min  -MF  23 min  -MF     PT Received On  --  08/13/21  -        Time Calculation- PT    Total Timed Code Minutes- PT  23 minute(s)  -MF  23 minute(s)  -MF        Timed Charges    45175 - Gait Training Minutes   23  -MF  23  -MF        Total Minutes    Timed Charges Total Minutes  23  -MF  23  -MF      Total Minutes  23  -MF  23  -MF       User Key  (r) = Recorded By, (t) = Taken By, (c) = Cosigned By    Initials Name Provider Type     Yakelin House PTA Physical Therapy Assistant        Therapy Charges for Today     Code Description Service Date Service Provider Modifiers Qty    08486650771 HC GAIT TRAINING EA 15 MIN 8/13/2021 Yakelin House PTA GP 2    29363359652 HC GAIT TRAINING EA 15 MIN 8/13/2021 Yakelin House PTA GP 2          PT G-Codes  Outcome Measure Options: AM-PAC 6 Clicks Basic Mobility (PT)  AM-PAC 6 Clicks Score (PT): 15    Yakelin House PTA  8/13/2021

## 2021-08-13 NOTE — PLAN OF CARE
Goal Outcome Evaluation:  Plan of Care Reviewed With: patient        Progress: improving  Outcome Summary: Pt. progressing well with therapy. Pt. still required CGA-MIN assist for bed mobility. He was Supervision for all other mobility. He was able to walk 450' without difficulty. Pt's O2 sat was 93% on RA during activity. Will continue to work on independence.

## 2021-08-14 ENCOUNTER — READMISSION MANAGEMENT (OUTPATIENT)
Dept: CALL CENTER | Facility: HOSPITAL | Age: 61
End: 2021-08-14

## 2021-08-14 LAB
BH BB BLOOD EXPIRATION DATE: NORMAL
BH BB BLOOD TYPE BARCODE: 5100
BH BB DISPENSE STATUS: NORMAL
BH BB PRODUCT CODE: NORMAL
BH BB UNIT NUMBER: NORMAL
CROSSMATCH INTERPRETATION: NORMAL
UNIT  ABO: NORMAL
UNIT  RH: NORMAL

## 2021-08-14 NOTE — OUTREACH NOTE
Prep Survey      Responses   [facility] facility patient discharged from?  [facility]   Is LACE score < 7 ?  No   Emergency Room discharge w/ pulse ox?  No   Eligibility  Readm Mgmt   Discharge diagnosis  CABG,  STEMI   Does the patient have one of the following disease processes/diagnoses(primary or secondary)?  Cardiothoracic surgery   Does the patient have Home health ordered?  No   Is there a DME ordered?  No   Prep survey completed?  Yes          Kymberly Goldberg RN

## 2021-08-14 NOTE — THERAPY DISCHARGE NOTE
Acute Care - Physical Therapy Discharge Summary  [facility]       Patient Name: Logan Mayen  : 1960  MRN: [id number]    Today's Date: 2021                 Admit Date: 8/10/2021      PT Recommendation and Plan    Visit Dx:    ICD-10-CM ICD-9-CM   1. ST elevation myocardial infarction (STEMI), unspecified artery (CMS/[facility])  I21.3 410.90   2. Myocardial infarction (CMS/[facility])  I21.9 410.90   3. Impaired mobility  Z74.09 799.89   4. Coronary artery disease involving native coronary artery of native heart with unstable angina pectoris (CMS/[facility])  I25.110 414.01     411.1               PT Rehab Goals     Row Name 21 0756             Bed Mobility Goal 1 (PT)    Activity/Assistive Device (Bed Mobility Goal 1, PT)  sit to supine/supine to sit  -JULIO      [place] Level/Cues Needed (Bed Mobility Goal 1, PT)  supervision required  -JULIO      Time Frame (Bed Mobility Goal 1, PT)  long term goal (LTG);10 days  -JULIO      Progress/Outcomes (Bed Mobility Goal 1, PT)  goal not met  -JULIO         Transfer Goal 1 (PT)    Activity/Assistive Device (Transfer Goal 1, PT)  sit-to-stand/stand-to-sit;bed-to-chair/chair-to-bed  -JULIO      [place] Level/Cues Needed (Transfer Goal 1, PT)  supervision required  -JULIO      Time Frame (Transfer Goal 1, PT)  long term goal (LTG);10 days  -JULIO      Progress/Outcome (Transfer Goal 1, PT)  goal not met  -JULIO         Gait Training Goal 1 (PT)    Activity/Assistive Device (Gait Training Goal 1, PT)  gait (walking locomotion);decrease fall risk;improve balance and speed;increase endurance/gait distance  -JULIO      [place] Level (Gait Training Goal 1, PT)  supervision required  -JULIO      Distance (Gait Training Goal 1, PT)  250 ft  -JULIO      Time Frame (Gait Training Goal 1, PT)  long term goal (LTG);10 days  -JULIO      Progress/Outcome (Gait Training Goal 1, PT)  goal not met  -JULIO         Stairs Goal 1 (PT)    Activity/Assistive Device (Stairs Goal 1, PT)  ascending stairs;descending  stairs  -JULIO      [place] Level/Cues Needed (Stairs Goal 1, PT)  supervision required  -JULIO      Number of Stairs (Stairs Goal 1, PT)  3 steps  -JULIO      Time Frame (Stairs Goal 1, PT)  long term goal (LTG);10 days  -JULIO      Progress/Outcome (Stairs Goal 1, PT)  goal not met  -JULIO        User Key  (r) = Recorded By, (t) = Taken By, (c) = Cosigned By    Initials Name Provider Type Discipline    Shar Wright, PTA Physical Therapy Assistant PT              PT Discharge Summary  Anticipated Discharge Disposition (PT): home  Reason for Discharge: Discharge from facility  Outcomes Achieved: Refer to plan of care for updates on goals achieved  Discharge Destination: Home      Shar Alarcon PTA   8/14/2021

## 2021-08-17 ENCOUNTER — READMISSION MANAGEMENT (OUTPATIENT)
Dept: CALL CENTER | Facility: HOSPITAL | Age: 61
End: 2021-08-17

## 2021-08-17 NOTE — OUTREACH NOTE
CT Surgery Week 1 Survey      Responses   [facility] patient discharged from?  [place]   Does the patient have one of the following disease processes/diagnoses(primary or secondary)?  Cardiothoracic surgery   Week 1 attempt successful?  Yes   Call start time  1117   Call end time  1127   Discharge diagnosis  CABG,  STEMI   Is patient permission given to speak with other caregiver?  Yes   Meds reviewed with patient/caregiver?  Yes   Does the patient have all medications related to this admission filled (includes all antibiotics, pain medications, cardiac medications, etc.)  Yes   Is the patient taking all medications as directed (includes completed medication regime)?  Yes   Comments regarding appointments  Hospital Follow Up with Alessio Chung MD Friday Sep 24, 2021 8:45 AM   Does the patient have a primary care provider?   Yes   Does the patient have an appointment scheduled with their C/T surgeon?  Yes [Post-Op with NEERU Rodriguez 8/25/21  330pm,  Post-Op with Zana Hernandez MD 9/29/21 230pm]   Comments regarding PCP  Logan Bryant MD 8/20/2021 11:20am    Has the patient kept scheduled appointments due by today?  N/A   Has home health visited the patient within 72 hours of discharge?  N/A   Psychosocial issues?  No   Did the patient receive a copy of their discharge instructions?  Yes   Nursing interventions  Reviewed instructions with patient   What is the patient's perception of their health status since discharge?  Improving   Nursing interventions  Nurse provided patient education   Is the patient /caregiver able to teach back basic post-op care?  Practice 'cough and deep breath', Keep incision areas clean, dry and protected   Is the patient/caregiver able to teach back signs and symptoms of incisional infection?  Increased redness, swelling or pain at the incisonal site, Increased drainage or bleeding, Incisional warmth, Pus or odor from incision, Fever   Is the patient/caregiver able  to teach back steps to recovery at home?  Set small, achievable goals for return to baseline health, Rest and rebuild strength, gradually increase activity, Eat a well-balance diet   Is the patient /caregiver able to teach back the importance of cardiac rehab?  Yes   Nursing interventions  Provided education on importance of cardiac rehab   If the patient is a current smoker, are they able to teach back resources for cessation?  Not a smoker   Is the patient/caregiver able to teach back the hierarchy of who to call/visit for symptoms/problems? PCP, Specialist, Home health nurse, Urgent Care, ED, 911  Yes   Week 1 call completed?  Yes            Carly Payan RN

## 2021-08-24 ENCOUNTER — READMISSION MANAGEMENT (OUTPATIENT)
Dept: CALL CENTER | Facility: HOSPITAL | Age: 61
End: 2021-08-24

## 2021-08-24 NOTE — OUTREACH NOTE
CT Surgery Week 2 Survey      Responses   [facility] patient discharged from?  [place]   Does the patient have one of the following disease processes/diagnoses(primary or secondary)?  Cardiothoracic surgery   Week 2 attempt successful?  Yes   Call start time  1034   Call end time  1039   Discharge diagnosis  CABG,  STEMI   Is patient permission given to speak with other caregiver?  No   Meds reviewed with patient/caregiver?  Yes   Does the patient have all medications related to this admission filled (includes all antibiotics, pain medications, cardiac medications, etc.)  Yes   Is the patient taking all medications as directed (includes completed medication regime)?  Yes   Does the patient have a primary care provider?   Yes   Does the patient have an appointment scheduled with their C/T surgeon?  Yes [Post op appt tomorrow 8/25]   Comments regarding PCP  Patient has seen PCP since discharge.    Has the patient kept scheduled appointments due by today?  Yes   Has home health visited the patient within 72 hours of discharge?  N/A   Psychosocial issues?  No   Did the patient receive a copy of their discharge instructions?  Yes   Nursing interventions  Reviewed instructions with patient   What is the patient's perception of their health status since discharge?  Improving   Nursing interventions  Nurse provided patient education   Is the patient /caregiver able to teach back basic post-op care?  Keep incision areas clean, dry and protected   Is the patient/caregiver able to teach back signs and symptoms of incisional infection?  Increased redness, swelling or pain at the incisonal site, Increased drainage or bleeding, Incisional warmth, Pus or odor from incision, Fever [Patient reports that he does have some redness and a scant amount of drainage from leg incisions, both legs. He has appt tomorrow with CT surgery. ]   Is the patient/caregiver able to teach back steps to recovery at home?  Set small, achievable  goals for return to baseline health, Rest and rebuild strength, gradually increase activity   If the patient is a current smoker, are they able to teach back resources for cessation?  Not a smoker   Is the patient/caregiver able to teach back the hierarchy of who to call/visit for symptoms/problems? PCP, Specialist, Home health nurse, Urgent Care, ED, 911  Yes   Week 2 call completed?  Yes          Carly Payan RN

## 2021-08-25 ENCOUNTER — OFFICE VISIT (OUTPATIENT)
Dept: CARDIAC SURGERY | Facility: CLINIC | Age: 61
End: 2021-08-25

## 2021-08-25 VITALS
OXYGEN SATURATION: 99 % | WEIGHT: 175 LBS | SYSTOLIC BLOOD PRESSURE: 102 MMHG | BODY MASS INDEX: 27.47 KG/M2 | HEIGHT: 67 IN | DIASTOLIC BLOOD PRESSURE: 60 MMHG | HEART RATE: 82 BPM

## 2021-08-25 DIAGNOSIS — I25.10 CORONARY ARTERY DISEASE INVOLVING NATIVE CORONARY ARTERY OF NATIVE HEART WITHOUT ANGINA PECTORIS: ICD-10-CM

## 2021-08-25 DIAGNOSIS — E78.5 HYPERLIPIDEMIA, UNSPECIFIED HYPERLIPIDEMIA TYPE: ICD-10-CM

## 2021-08-25 DIAGNOSIS — E66.3 OVERWEIGHT (BMI 25.0-29.9): ICD-10-CM

## 2021-08-25 DIAGNOSIS — Z78.9 ALCOHOL USE: ICD-10-CM

## 2021-08-25 DIAGNOSIS — I10 ESSENTIAL HYPERTENSION: ICD-10-CM

## 2021-08-25 DIAGNOSIS — I21.21 ST ELEVATION MYOCARDIAL INFARCTION INVOLVING LEFT CIRCUMFLEX CORONARY ARTERY (HCC): Primary | ICD-10-CM

## 2021-08-25 PROCEDURE — 99024 POSTOP FOLLOW-UP VISIT: CPT | Performed by: NURSE PRACTITIONER

## 2021-08-25 NOTE — PROGRESS NOTES
"Subjective   Chief Complaint   Patient presents with   • STEMI     1 WK D/C FU- SP CABG        Patient ID: Logan Mayen is a 60 y.o. male who is here for follow-up having had emergency coronary artery bypass grafting -4 vessel, right lower extremity endoscopic vein harvest, bilateral lower extremity open vein harvest performed on 8/10/2021 by Dr. Hernandez.     History of Present Illness  Post operative recovery was uneventful without any major complications. He returns today for 1 week follow up. Sleep habits are fair. Pain control has been good. No fevers/sweats/chills. He has been using hydrogen peroxide on his surgical incisions. Occasional sternal click. No chest pain or shortness of breath. He does have some numbness in right 3rd, 4th, and 5th fingers but this is improving. Appetite is good. Walking and ambulating well daily.      The following portions of the patient's history were reviewed and updated as appropriate: allergies, current medications, past family history, past medical history, past social history, past surgical history and problem list.    Review of Systems   Constitutional: Negative for chills, diaphoresis and fever.   Respiratory: Negative for cough, shortness of breath and wheezing.    Cardiovascular: Positive for chest pain (incisional) and leg swelling (minimal bilateral ).       Objective   Visit Vitals  /60 (BP Location: Left arm, Patient Position: Sitting, Cuff Size: Adult)   Pulse 82   Ht 170.2 cm (67.01\")   Wt 79.4 kg (175 lb)   SpO2 99%   BMI 27.40 kg/m²       Physical Exam  Vitals reviewed.   Constitutional:       General: He is not in acute distress.     Appearance: He is well-developed. He is not diaphoretic.   HENT:      Head: Normocephalic and atraumatic.   Eyes:      Pupils: Pupils are equal, round, and reactive to light.   Cardiovascular:      Rate and Rhythm: Normal rate and regular rhythm.      Heart sounds: Normal heart sounds. No murmur heard.   No friction rub. "   Pulmonary:      Effort: Pulmonary effort is normal. No respiratory distress.      Breath sounds: Normal breath sounds. No wheezing or rales.   Abdominal:      General: There is no distension.      Palpations: Abdomen is soft.      Tenderness: There is no abdominal tenderness. There is no guarding.   Musculoskeletal:      Cervical back: Normal range of motion and neck supple.      Right lower leg: Edema (mild ) present.      Left lower leg: Edema (mild ) present.   Skin:     General: Skin is warm and dry.      Coloration: Skin is not pale.      Findings: No rash.      Comments: Sternotomy site clean, dry, and intact. Sternum stable. No clicks. Bilateral saphenectomy sites clean and dry. bioglue has dissolved.    Neurological:      Mental Status: He is alert and oriented to person, place, and time.   Psychiatric:         Behavior: Behavior normal.       Assessment/Plan         Diagnoses and all orders for this visit:    1. ST elevation myocardial infarction involving left circumflex coronary artery (CMS/HCC) (Primary)    2. Coronary artery disease involving native coronary artery of native heart without angina pectoris    3. Essential hypertension    4. Hyperlipidemia, unspecified hyperlipidemia type    5. Alcohol use    6. Overweight (BMI 25.0-29.9)         Overall, Logan Mayen is doing well. Educated on surgical wound care-antibacterial soap and water use only. No evidence of infection. He is eager to get back to weight lifting but we discussed current sternotomy precautions and how these will not be advanced yet. Following post op cardiac surgery home instructions. Provided support and encouragement. All questions have been answered to the best of my ability. Will discuss starting cardiac rehabilitation at official 1 month post op visit. Patient has follow up with Dr. Hernandez in a few weeks. Patient has follow up with Cardiology in a few weeks. Patient has been instructed to contact our office with any  questions or concerns should they arise prior to the next office visit.     Patient's Body mass index is 27.4 kg/m². indicating that he is overweight (BMI 25-29.9). Obesity-related health conditions include the following: hypertension, coronary heart disease and dyslipidemias. Obesity is newly identified. BMI is is above average; BMI management plan is completed. We discussed portion control and increasing exercise.         Logan ANTONIO Tiarra  reports that he has never smoked. He has never used smokeless tobacco.           Advance Care Planning   ACP discussion was declined by the patient. N/A as patient is under 65 years of age

## 2021-09-01 ENCOUNTER — READMISSION MANAGEMENT (OUTPATIENT)
Dept: CALL CENTER | Facility: HOSPITAL | Age: 61
End: 2021-09-01

## 2021-09-01 NOTE — OUTREACH NOTE
CT Surgery Week 3 Survey      Responses   [facility] patient discharged from?  [place]   Does the patient have one of the following disease processes/diagnoses(primary or secondary)?  Cardiothoracic surgery   Week 3 attempt successful?  Yes   Call start time  1227   Call end time  1230   Discharge diagnosis  CABG,  STEMI   Meds reviewed with patient/caregiver?  Yes   Is the patient having any side effects they believe may be caused by any medication additions or changes?  No   Does the patient have all medications related to this admission filled (includes all antibiotics, pain medications, cardiac medications, etc.)  Yes   Is the patient taking all medications as directed (includes completed medication regime)?  Yes   Comments regarding appointments  Hospital Follow Up with Alessio Chung MD Friday Sep 24, 2021 8:45 AM   Does the patient have a primary care provider?   Yes   Does the patient have an appointment scheduled with their C/T surgeon?  Yes   Has the patient kept scheduled appointments due by today?  Yes   Psychosocial issues?  No   Did the patient receive a copy of their discharge instructions?  Yes   Nursing interventions  Reviewed instructions with patient   What is the patient's perception of their health status since discharge?  Improving   Nursing interventions  Nurse provided patient education   Nursing interventions  Reassured on normal signs of recovery   Is the patient /caregiver able to teach back basic post-op care?  Keep incision areas clean, dry and protected   Is the patient/caregiver able to teach back signs and symptoms of incisional infection?  Increased redness, swelling or pain at the incisonal site, Increased drainage or bleeding, Incisional warmth, Pus or odor from incision, Fever   Is the patient/caregiver able to teach back steps to recovery at home?  Set small, achievable goals for return to baseline health, Rest and rebuild strength, gradually increase activity   Is the  patient /caregiver able to teach back the importance of cardiac rehab?  Yes   Nursing interventions  Provided education on importance of cardiac rehab   If the patient is a current smoker, are they able to teach back resources for cessation?  Not a smoker   Is the patient/caregiver able to teach back the hierarchy of who to call/visit for symptoms/problems? PCP, Specialist, Home health nurse, Urgent Care, ED, 911  Yes   Week 3 call completed?  Yes   Wrap up additional comments  doing really well.          Leona Ascencio, RN

## 2021-09-24 ENCOUNTER — OFFICE VISIT (OUTPATIENT)
Dept: CARDIOLOGY | Facility: CLINIC | Age: 61
End: 2021-09-24

## 2021-09-24 VITALS
HEIGHT: 67 IN | BODY MASS INDEX: 28.72 KG/M2 | DIASTOLIC BLOOD PRESSURE: 92 MMHG | HEART RATE: 73 BPM | OXYGEN SATURATION: 99 % | SYSTOLIC BLOOD PRESSURE: 150 MMHG | WEIGHT: 183 LBS

## 2021-09-24 DIAGNOSIS — I25.2 HISTORY OF ST ELEVATION MYOCARDIAL INFARCTION (STEMI): ICD-10-CM

## 2021-09-24 DIAGNOSIS — E78.5 HYPERLIPIDEMIA, UNSPECIFIED HYPERLIPIDEMIA TYPE: ICD-10-CM

## 2021-09-24 DIAGNOSIS — Z95.1 S/P CABG (CORONARY ARTERY BYPASS GRAFT): ICD-10-CM

## 2021-09-24 DIAGNOSIS — I10 ESSENTIAL HYPERTENSION: Primary | ICD-10-CM

## 2021-09-24 DIAGNOSIS — I25.10 CORONARY ARTERY DISEASE INVOLVING NATIVE CORONARY ARTERY OF NATIVE HEART WITHOUT ANGINA PECTORIS: ICD-10-CM

## 2021-09-24 PROBLEM — R20.2 NUMBNESS AND TINGLING OF RIGHT ARM: Status: ACTIVE | Noted: 2021-09-24

## 2021-09-24 PROBLEM — I21.3 STEMI (ST ELEVATION MYOCARDIAL INFARCTION) (HCC): Status: RESOLVED | Noted: 2021-08-10 | Resolved: 2021-09-24

## 2021-09-24 PROBLEM — R20.0 NUMBNESS AND TINGLING OF RIGHT ARM: Status: ACTIVE | Noted: 2021-09-24

## 2021-09-24 PROCEDURE — 99214 OFFICE O/P EST MOD 30 MIN: CPT | Performed by: INTERNAL MEDICINE

## 2021-09-24 NOTE — PROGRESS NOTES
Logan Mayen  [id number]  1960  61 y.o.  male    Referring Provider: Logan Bryant MD    Reason for  Visit:   coronary artery disease   history of STEMI   coronary artery bypass grafting X 4     Subjective    Overall feeling well   No chest pain or shortness of breath     No palpitations  No significant pedal edema    Compliant with medications and dietary advice  Good effort tolerance    No presyncope or syncope  Compliant with medications    Tolerating current medications well with no untoward side effects   Compliant with prescribed medication regimen. Tries to adhere to cardiac diet.      No bleeding, excessive bruising, gait instability or fall risks    BP well controlled at home.           History of present illness:  Logan Mayen is a 61 y.o. yo male with history of coronary artery disease prior STEMI  who presents today for   Chief Complaint   Patient presents with   • Coronary Artery Disease     6 wk f/u   .    History  Past Medical History:   Diagnosis Date   • Coronary artery disease    • Hyperlipidemia    • Hypertension    • Myocardial infarction (CMS/[facility])    ,   Past Surgical History:   Procedure Laterality Date   • CARDIAC CATHETERIZATION     • CARDIAC CATHETERIZATION N/A 8/10/2021    Procedure: Left Heart Cath;  Surgeon: Alessio Chung MD;  Location:  [facility] CATH INVASIVE LOCATION;  Service: Cardiovascular;  Laterality: N/A;   • CATARACT EXTRACTION, BILATERAL     • CORONARY ARTERY BYPASS GRAFT N/A 8/10/2021    Procedure: CORONARY ARTERY BYPASS GRAFT X 4 WITH LEFT INTERNAL MAMMARY ARTERY, RIGHT LOWER EXTREMITY ENDOSCOPIC VEIN HARVEST, BILATERAL LOWER EXTREMITY OPEN VEIN HARVEST, AND PERFUSION; TRANSESOPHAGEAL ECHOCARDIOGRAM;  Surgeon: Zana Hernandez MD;  Location: [facility] OR;  Service: Cardiothoracic;  Laterality: N/A;   • CORONARY STENT PLACEMENT     • PERIPHERAL ARTERIAL STENT GRAFT     ,   Family History   Problem Relation Age of Onset   • Lung disease Mother    • Heart  attack Father    • Heart disease Father    • Lung disease Sister    • Rheum arthritis Sister    • Cancer Sister    • Rheum arthritis Sister    • Liver disease Sister    ,   Social History     Tobacco Use   • Smoking status: Never Smoker   • Smokeless tobacco: Never Used   Substance Use Topics   • Alcohol use: Yes     Alcohol/week: 6.0 standard drinks     Types: 6 Cans of beer per week     Comment: 2-3 x a week   • Drug use: Yes     Types: Marijuana     Comment: sometimes   ,     Medications  Current Outpatient Medications   Medication Sig Dispense Refill   • aspirin 81 MG chewable tablet Chew 81 mg Daily.     • atorvastatin (LIPITOR) 40 MG tablet Take 1 tablet by mouth Every Night. 30 tablet 2   • Cholecalciferol (VITAMIN D3) 125 MCG (5000 UT) capsule capsule Take 5,000 Units by mouth Daily.     • clopidogrel (PLAVIX) 75 MG tablet Take 1 tablet by mouth Daily. 30 tablet 2   • Garlic 100 MG tablet Take 400 mg by mouth Daily.     • Krill Oil 350 MG capsule Take 2 tablets by mouth Daily.     • metoprolol tartrate (LOPRESSOR) 25 MG tablet Take 1 tablet by mouth 2 (Two) Times a Day. 60 tablet 2   • ramipril (ALTACE) 1.25 MG capsule Take 1 capsule by mouth Daily. 30 capsule 0   • sildenafil (REVATIO) 20 MG tablet Take 40 mg by mouth Daily As Needed. Take 2 tablets daily as needed for erectile dysfunction.     • Turmeric 500 MG capsule Take 2 tablets by mouth Daily.     • furosemide (Lasix) 40 MG tablet Take 1 tablet by mouth Daily for 7 days. 7 tablet 0     No current facility-administered medications for this visit.       Allergies:  Patient has no known allergies.    Review of Systems  Review of Systems   Constitutional: Negative.   HENT: Negative.    Eyes: Negative.    Cardiovascular: Negative for chest pain, claudication, cyanosis, dyspnea on exertion, irregular heartbeat, leg swelling, near-syncope, orthopnea, palpitations, paroxysmal nocturnal dyspnea and syncope.   Respiratory: Negative.    Endocrine: Negative.   "  Hematologic/Lymphatic: Negative.    Skin: Negative.    Musculoskeletal: Positive for arthritis. Negative for back pain and joint pain.   Gastrointestinal: Negative for anorexia.   Genitourinary: Negative.    Neurological: Negative.    Psychiatric/Behavioral: Negative.        Objective     Physical Exam:  /92   Pulse 73   Ht 170.2 cm (67\")   Wt 83 kg (183 lb)   SpO2 99%   BMI 28.66 kg/m²     Physical Exam  Constitutional:       Appearance: He is well-developed.   HENT:      Head: Normocephalic.   Neck:      Vascular: Normal carotid pulses. No carotid bruit or JVD.      Trachea: No tracheal tenderness or tracheal deviation.   Cardiovascular:      Rate and Rhythm: Regular rhythm.      Pulses: Normal pulses.      Heart sounds: Normal heart sounds.   Pulmonary:      Effort: Pulmonary effort is normal.      Breath sounds: No stridor.   Abdominal:      General: There is no distension.      Palpations: Abdomen is soft.      Tenderness: There is no abdominal tenderness.   Musculoskeletal:      Cervical back: No edema.   Skin:     General: Skin is warm.   Neurological:      Mental Status: He is alert.      Cranial Nerves: No cranial nerve deficit.      Sensory: No sensory deficit.   Psychiatric:         Speech: Speech normal.         Behavior: Behavior normal.     Surgical wound healed very well. No evidence of excessive bruising, pain, redness, swelling, discharge or foul odor noted  Patient declined any recent history of fever, chills, pain or warmth locally       Results Review:      Conclusion of cardiac catheterization    8/10/2021     Short left main coronary artery without any significant disease  Proximal left anterior descending coronary artery has a 70 to 80% stenosis  Proximal large diagonal branch has 70% stenosis  Proximal large obtuse marginal branch has 70% stenosis  Large dominant left circumflex coronary artery occluded distally treated with PTCA with establishment of WILLIAM-3 flow with complete " resolution of chest pain  Nondominant moderate-sized right coronary artery with chronic total occlusion of the midsegment with vessel two-vessel collateral  Patent bilateral internal mammary arteries  Normal left ventricular end-diastolic pressure 12 mmHg  Left ventricular ejection fraction of approximately 50% with mild distal inferior and inferior lateral hypokinesis     ____________________________________________________________________________________________________________________________________________     Plan after cardiac catheterization        Angiomax bolus and infusion given infusion will be continued  Integrilin bolus infusion given which will be continued  CT surgery consultation  Discussed with Dr. Hernandez and talk to Viry SIDDIQI  Keep n.p.o. for now  Further recommendations pending CT surgery consultation     Logan Mayen  Echo Complete w/ Doppler, Color Flow, Contrast and Strain  Order# [id number]  Reading physician: Alessio Chung MD Ordering physician: Viry Garcia APRN Study date: 8/10/21   Patient Information    Patient Name   Logan Mayen MRN   [id number] Legal Sex   Male  (Age)   1960 (61 y.o.)   PACS Images     Show images for Adult Transthoracic Echo Complete W/ Cont if Necessary Per Protocol    Sedation Narrator Report    Sedation Narrator Report      Interpretation Summary    · Left ventricular ejection fraction appears to be 51 - 55%. Left ventricular systolic function is low normal.  · Left ventricular diastolic function was normal.  · Abnormal global longitudinal LV strain (GLS) = -13%  · Estimated right ventricular systolic pressure from tricuspid regurgitation is normal (<35 mmHg).            Results for orders placed during the hospital encounter of 08/10/21    Intra-Op CHARITY    Interpretation Summary  · Estimated left ventricular EF = 50%  · Postop no free air         ____________________________________________________________________________________________________________________________________________  Health maintenance and recommendations    Low salt/ HTN/ Heart healthy carbohydrate restricted cardiac diet   The patient is advised to reduce or avoid caffeine or other cardiac stimulants.   Minimize or avoid  NSAID-type medications      Monitor for any signs of bleeding including red or dark stools. Fall precautions.   Advised staying uptodate with immunizations per established standard guidelines.    Offered to give patient  a copy of my notes     Questions were encouraged, asked and answered to the patient's  understanding and satisfaction. Questions if any regarding current medications and side effects, need for refills and importance of compliance to medications stressed.    Reviewed available prior notes, consults, prior visits, laboratory findings, radiology and cardiology relevant reports. Updated chart as applicable. I have reviewed the patient's medical history in detail and updated the computerized patient record as relevant.      Updated patient regarding any new or relevant abnormalities on review of records or any new findings on physical exam. Mentioned to patient about purpose of visit and desirable health short and long term goals and objectives.    Primary to monitor CBC CMP Lipid panel and TSH as applicable    ___________________________________________________________________________________________________________________________________________   Procedures    Assessment/Plan   Diagnoses and all orders for this visit:    1. Essential hypertension (Primary)    2. Hyperlipidemia, unspecified hyperlipidemia type    3. Coronary artery disease involving native coronary artery of native heart without angina pectoris    4. S/P CABG (coronary artery bypass graft) x 4 Dr Hernandez 08/11/21  -     Ambulatory Referral to Cardiac Rehab    5. History of ST elevation  myocardial infarction (STEMI)          Plan    HE NEEDS CARDIAC REHAB   Orders Placed This Encounter   Procedures   • Ambulatory Referral to Cardiac Rehab     Referral Priority:   Routine     Referral Type:   Rehabilitation - Outpatient     Number of Visits Requested:   1      For reasons unclear to me and contrary to medical knowledge he was told, per patient, that RN who called him after discharge told him that  he does not need cardiac  rehab as he was walking     Keep LDL below 70 mg/dl. Monitor liver and renal functions.   Monitor CBC, CMP, TSH (as indicated) and Lipid Panel by primary     Plans for Covid vaccine next week     Monitor for any signs of bleeding including red or dark stools as well as easy bruisabilty. Fall precautions.   Will see back after 3 months     No  NTG as on Sildenafil weekly use               Return in about 3 months (around 12/24/2021).

## 2021-09-29 ENCOUNTER — OFFICE VISIT (OUTPATIENT)
Dept: CARDIAC SURGERY | Facility: CLINIC | Age: 61
End: 2021-09-29

## 2021-09-29 VITALS
OXYGEN SATURATION: 99 % | DIASTOLIC BLOOD PRESSURE: 71 MMHG | HEART RATE: 70 BPM | BODY MASS INDEX: 28.47 KG/M2 | SYSTOLIC BLOOD PRESSURE: 120 MMHG | WEIGHT: 181.4 LBS | HEIGHT: 67 IN

## 2021-09-29 DIAGNOSIS — I21.21 ST ELEVATION MYOCARDIAL INFARCTION INVOLVING LEFT CIRCUMFLEX CORONARY ARTERY (HCC): Primary | ICD-10-CM

## 2021-09-29 DIAGNOSIS — I25.2 HISTORY OF ST ELEVATION MYOCARDIAL INFARCTION (STEMI): ICD-10-CM

## 2021-09-29 DIAGNOSIS — I25.10 CORONARY ARTERY DISEASE INVOLVING NATIVE CORONARY ARTERY OF NATIVE HEART WITHOUT ANGINA PECTORIS: ICD-10-CM

## 2021-09-29 DIAGNOSIS — Z95.1 S/P CABG (CORONARY ARTERY BYPASS GRAFT): ICD-10-CM

## 2021-09-29 PROCEDURE — 99024 POSTOP FOLLOW-UP VISIT: CPT | Performed by: THORACIC SURGERY (CARDIOTHORACIC VASCULAR SURGERY)

## 2021-10-18 NOTE — PROGRESS NOTES
"Subjective   Chief Complaint   Patient presents with   • Post-op Follow-up     Pt had CABG x4 on 8/10       Patient ID: Logan Mayen is a 61 y.o. male who is here for follow-up having had emergency coronary artery bypass grafting -4 vessel, right lower extremity endoscopic vein harvest, bilateral lower extremity open vein harvest performed on 8/10/2021 by Dr. Hernandez.     History of Present Illness  Post operative recovery was uneventful without any major complications.  He returns for his formal postoperative visit.  Sleep habits are back to baseline.  He returns today for 1 week follow up. Sleep habits are fair. Pain control has been good. No fevers/sweats/chills. He has been using hydrogen peroxide on his surgical incisions. Occasional sternal click. No chest pain or shortness of breath. He does have some numbness in right 3rd, 4th, and 5th fingers but this is improving. Appetite is good. Walking and ambulating well daily.      The following portions of the patient's history were reviewed and updated as appropriate: allergies, current medications, past family history, past medical history, past social history, past surgical history and problem list.    Review of Systems   Constitutional: Negative for chills, diaphoresis and fever.   Respiratory: Negative for cough, shortness of breath and wheezing.    Cardiovascular: Negative for chest pain and leg swelling.   Musculoskeletal: Positive for arthralgias.       Objective   Visit Vitals  /71 (BP Location: Left arm, Patient Position: Sitting, Cuff Size: Adult)   Pulse 70   Ht 170.2 cm (67\")   Wt 82.3 kg (181 lb 6.4 oz)   SpO2 99%   BMI 28.41 kg/m²       Physical Exam  Vitals reviewed.   Constitutional:       General: He is not in acute distress.     Appearance: He is well-developed. He is not diaphoretic.   HENT:      Head: Normocephalic and atraumatic.   Eyes:      Pupils: Pupils are equal, round, and reactive to light.   Cardiovascular:      Rate and " Rhythm: Normal rate and regular rhythm.      Heart sounds: Normal heart sounds. No murmur heard.  No friction rub.   Pulmonary:      Effort: Pulmonary effort is normal. No respiratory distress.      Breath sounds: Normal breath sounds. No wheezing or rales.   Abdominal:      General: There is no distension.      Palpations: Abdomen is soft.      Tenderness: There is no abdominal tenderness. There is no guarding.   Musculoskeletal:      Cervical back: Normal range of motion and neck supple.      Right lower leg: No edema.      Left lower leg: No edema.   Skin:     General: Skin is warm and dry.      Coloration: Skin is not pale.      Findings: No rash.      Comments: Sternotomy site well-healed. Sternum stable. No clicks. Bilateral saphenectomy sites well-healed.     Neurological:      Mental Status: He is alert and oriented to person, place, and time.   Psychiatric:         Behavior: Behavior normal.       Assessment/Plan         Diagnoses and all orders for this visit:    1. ST elevation myocardial infarction involving left circumflex coronary artery (CMS/HCC) (Primary)  -     [facility]    2. Coronary artery disease involving native coronary artery of native heart without angina pectoris  -     [facility]    3. S/P CABG (coronary artery bypass graft) x 4 Dr Hernandez 08/11/21    4. History of ST elevation myocardial infarction (STEMI)         Overall, Logan Mayen is doing well.  We discussed sternotomy precautions and how these will evolve over the next couple months.  He has been cleared to begin cardiac rehabilitation.  We discussed importance of ongoing risk factor modification including developing an active lifestyle, eating a heart healthy diet, and strict medical adherence.    All questions have been answered to the best of my ability.  He has follow-up with Dr. Jj.      Patient's Body mass index is 28.41 kg/m². indicating that he is  overweight (BMI 25-29.9). Obesity-related health conditions include the following: hypertension, coronary heart disease and dyslipidemias. Obesity is newly identified. BMI is is above average; BMI management plan is completed. We discussed portion control and increasing exercise.         Logan Mayen  reports that he has never smoked. He has never used smokeless tobacco.       Although I have not given him a specific return to clinic appointment, should I be of any further assist in the future, please do not hesitate to contact me.    Advance Care Planning   ACP discussion was declined by the patient. N/A as patient is under 65 years of age

## 2021-11-19 RX ORDER — CLOPIDOGREL BISULFATE 75 MG/1
75 TABLET ORAL DAILY
Qty: 90 TABLET | Refills: 4 | Status: SHIPPED | OUTPATIENT
Start: 2021-11-19 | End: 2023-02-27

## 2021-11-23 ENCOUNTER — TRANSCRIBE ORDERS (OUTPATIENT)
Dept: ADMINISTRATIVE | Facility: HOSPITAL | Age: 61
End: 2021-11-23

## 2021-11-24 ENCOUNTER — TRANSCRIBE ORDERS (OUTPATIENT)
Dept: NEUROLOGY | Facility: HOSPITAL | Age: 61
End: 2021-11-24

## 2021-11-24 DIAGNOSIS — G56.21 ULNAR NEUROPATHY OF RIGHT UPPER EXTREMITY: Primary | ICD-10-CM

## 2021-12-09 ENCOUNTER — HOSPITAL ENCOUNTER (OUTPATIENT)
Dept: NEUROLOGY | Facility: HOSPITAL | Age: 61
Discharge: HOME | End: 2021-12-09
Admitting: FAMILY MEDICINE

## 2021-12-09 DIAGNOSIS — G56.21 ULNAR NEUROPATHY OF RIGHT UPPER EXTREMITY: ICD-10-CM

## 2021-12-09 PROCEDURE — 95909 NRV CNDJ TST 5-6 STUDIES: CPT | Performed by: PSYCHIATRY & NEUROLOGY

## 2021-12-09 PROCEDURE — 95909 NRV CNDJ TST 5-6 STUDIES: CPT

## 2021-12-20 ENCOUNTER — TRANSCRIBE ORDERS (OUTPATIENT)
Dept: ADMINISTRATIVE | Facility: HOSPITAL | Age: 61
End: 2021-12-20

## 2021-12-20 DIAGNOSIS — R20.0 NUMBNESS AND TINGLING: Primary | ICD-10-CM

## 2021-12-20 DIAGNOSIS — R20.2 NUMBNESS AND TINGLING: Primary | ICD-10-CM

## 2021-12-22 ENCOUNTER — APPOINTMENT (OUTPATIENT)
Dept: NEUROLOGY | Facility: HOSPITAL | Age: 61
End: 2021-12-22

## 2022-01-05 ENCOUNTER — HOSPITAL ENCOUNTER (OUTPATIENT)
Dept: NEUROLOGY | Facility: HOSPITAL | Age: 62
Discharge: HOME | End: 2022-01-05
Admitting: ORTHOPAEDIC SURGERY

## 2022-01-05 DIAGNOSIS — R20.0 NUMBNESS AND TINGLING: ICD-10-CM

## 2022-01-05 DIAGNOSIS — R20.2 NUMBNESS AND TINGLING: ICD-10-CM

## 2022-01-05 PROCEDURE — 95910 NRV CNDJ TEST 7-8 STUDIES: CPT | Performed by: PSYCHIATRY & NEUROLOGY

## 2022-01-05 PROCEDURE — 95885 MUSC TST DONE W/NERV TST LIM: CPT | Performed by: PSYCHIATRY & NEUROLOGY

## 2022-01-05 PROCEDURE — 95885 MUSC TST DONE W/NERV TST LIM: CPT

## 2022-01-05 PROCEDURE — 95910 NRV CNDJ TEST 7-8 STUDIES: CPT

## 2022-01-12 ENCOUNTER — TELEPHONE (OUTPATIENT)
Dept: NEUROLOGY | Facility: CLINIC | Age: 62
End: 2022-01-12

## 2022-01-12 NOTE — TELEPHONE ENCOUNTER
accidentally hung up on ortho dr. Green's office. Someone had called for emg results - I found the [facility] neurodiagnositc phone number - [phone]. She can call this number to request emg results

## 2022-02-02 ENCOUNTER — APPOINTMENT (OUTPATIENT)
Dept: NEUROLOGY | Facility: HOSPITAL | Age: 62
End: 2022-02-02

## 2022-06-08 ENCOUNTER — TRANSCRIBE ORDERS (OUTPATIENT)
Dept: ADMINISTRATIVE | Facility: HOSPITAL | Age: 62
End: 2022-06-08

## 2022-06-11 ENCOUNTER — TRANSCRIBE ORDERS (OUTPATIENT)
Dept: ADMINISTRATIVE | Facility: HOSPITAL | Age: 62
End: 2022-06-11

## 2022-06-11 DIAGNOSIS — G56.20 ULNAR NERVE COMPRESSION, UNSPECIFIED LATERALITY: Primary | ICD-10-CM

## 2022-06-11 DIAGNOSIS — G56.21 ULNAR NERVE COMPRESSION, RIGHT: ICD-10-CM

## 2022-07-01 ENCOUNTER — APPOINTMENT (OUTPATIENT)
Dept: MRI IMAGING | Facility: HOSPITAL | Age: 62
End: 2022-07-01

## 2022-10-05 ENCOUNTER — OFFICE VISIT (OUTPATIENT)
Dept: SURGERY | Facility: CLINIC | Age: 62
End: 2022-10-05

## 2022-10-05 ENCOUNTER — PRE-ADMISSION TESTING (OUTPATIENT)
Dept: PREADMISSION TESTING | Facility: HOSPITAL | Age: 62
End: 2022-10-05

## 2022-10-05 VITALS
DIASTOLIC BLOOD PRESSURE: 62 MMHG | RESPIRATION RATE: 16 BRPM | HEART RATE: 68 BPM | WEIGHT: 184.08 LBS | HEIGHT: 67 IN | SYSTOLIC BLOOD PRESSURE: 100 MMHG | OXYGEN SATURATION: 97 % | BODY MASS INDEX: 28.89 KG/M2

## 2022-10-05 VITALS
DIASTOLIC BLOOD PRESSURE: 70 MMHG | WEIGHT: 178 LBS | SYSTOLIC BLOOD PRESSURE: 120 MMHG | HEIGHT: 67 IN | BODY MASS INDEX: 27.94 KG/M2

## 2022-10-05 DIAGNOSIS — K36 CHRONIC APPENDICITIS: ICD-10-CM

## 2022-10-05 DIAGNOSIS — K35.32 RUPTURED APPENDICITIS: ICD-10-CM

## 2022-10-05 DIAGNOSIS — K82.8 BILIARY DYSKINESIA: ICD-10-CM

## 2022-10-05 DIAGNOSIS — K35.32 RUPTURED APPENDICITIS: Primary | ICD-10-CM

## 2022-10-05 LAB
ALBUMIN SERPL-MCNC: 4.3 G/DL (ref 3.5–5.2)
ALBUMIN/GLOB SERPL: 1.4 G/DL
ALP SERPL-CCNC: 73 U/L (ref 39–117)
ALT SERPL W P-5'-P-CCNC: 23 U/L (ref 1–41)
ANION GAP SERPL CALCULATED.3IONS-SCNC: 12 MMOL/L (ref 5–15)
AST SERPL-CCNC: 23 U/L (ref 1–40)
BILIRUB SERPL-MCNC: 0.6 MG/DL (ref 0–1.2)
BUN SERPL-MCNC: 10 MG/DL (ref 8–23)
BUN/CREAT SERPL: 13.5 (ref 7–25)
CALCIUM SPEC-SCNC: 9.5 MG/DL (ref 8.6–10.5)
CHLORIDE SERPL-SCNC: 104 MMOL/L (ref 98–107)
CO2 SERPL-SCNC: 23 MMOL/L (ref 22–29)
CREAT SERPL-MCNC: 0.74 MG/DL (ref 0.76–1.27)
DEPRECATED RDW RBC AUTO: 41.2 FL (ref 37–54)
EGFRCR SERPLBLD CKD-EPI 2021: 103.1 ML/MIN/1.73
ERYTHROCYTE [DISTWIDTH] IN BLOOD BY AUTOMATED COUNT: 12.2 % (ref 12.3–15.4)
GLOBULIN UR ELPH-MCNC: 3 GM/DL
GLUCOSE SERPL-MCNC: 98 MG/DL (ref 65–99)
HCT VFR BLD AUTO: 45.2 % (ref 37.5–51)
HGB BLD-MCNC: 15.8 G/DL (ref 13–17.7)
MCH RBC QN AUTO: 32.2 PG (ref 26.6–33)
MCHC RBC AUTO-ENTMCNC: 35 G/DL (ref 31.5–35.7)
MCV RBC AUTO: 92.2 FL (ref 79–97)
PLATELET # BLD AUTO: 204 10*3/MM3 (ref 140–450)
PMV BLD AUTO: 9.1 FL (ref 6–12)
POTASSIUM SERPL-SCNC: 4.1 MMOL/L (ref 3.5–5.2)
PROT SERPL-MCNC: 7.3 G/DL (ref 6–8.5)
RBC # BLD AUTO: 4.9 10*6/MM3 (ref 4.14–5.8)
SODIUM SERPL-SCNC: 139 MMOL/L (ref 136–145)
WBC NRBC COR # BLD: 5.64 10*3/MM3 (ref 3.4–10.8)

## 2022-10-05 PROCEDURE — 93005 ELECTROCARDIOGRAM TRACING: CPT

## 2022-10-05 PROCEDURE — 85027 COMPLETE CBC AUTOMATED: CPT

## 2022-10-05 PROCEDURE — 93010 ELECTROCARDIOGRAM REPORT: CPT | Performed by: INTERNAL MEDICINE

## 2022-10-05 PROCEDURE — 99214 OFFICE O/P EST MOD 30 MIN: CPT | Performed by: SPECIALIST

## 2022-10-05 PROCEDURE — 80053 COMPREHEN METABOLIC PANEL: CPT

## 2022-10-05 PROCEDURE — 36415 COLL VENOUS BLD VENIPUNCTURE: CPT

## 2022-10-05 RX ORDER — RAMIPRIL 10 MG/1
1 CAPSULE ORAL DAILY
COMMUNITY
End: 2022-10-05 | Stop reason: ALTCHOICE

## 2022-10-05 RX ORDER — ONDANSETRON 2 MG/ML
4 INJECTION INTRAMUSCULAR; INTRAVENOUS EVERY 6 HOURS PRN
Status: CANCELLED | OUTPATIENT
Start: 2022-10-05

## 2022-10-05 RX ORDER — SODIUM CHLORIDE 9 MG/ML
100 INJECTION, SOLUTION INTRAVENOUS CONTINUOUS
Status: CANCELLED | OUTPATIENT
Start: 2022-10-05

## 2022-10-05 RX ORDER — ROSUVASTATIN CALCIUM 10 MG/1
10 TABLET, COATED ORAL NIGHTLY
COMMUNITY
Start: 2022-09-20

## 2022-10-05 NOTE — PATIENT INSTRUCTIONS
It was good to see you again Mr. Mayen, we will look toward surgery this coming Tuesday, 11 October, stop your Plavix after today, and we will look toward laparoscopic cholecystectomy and appendectomy on the 11th overnight stay and then discharged next Wednesday.    Prework is schedule for 10/5/2022 @ 1:15 p.m

## 2022-10-05 NOTE — NURSING NOTE
Per Cheyanne in Dr. Moore's office patient scheduled for Laparoscopic Appendectomy and Cholecystectomy. Patient stated he is unsure if he wants Gallbladder out. Cheyanne stated to make consent for Laparoscopic Appendectomy and he can discuss about Cholecystectomy with Dr. Moore day of surgery.

## 2022-10-05 NOTE — DISCHARGE INSTRUCTIONS
Before you come to the hospital        Arrival time: AS DIRECTED BY OFFICE     YOU MAY TAKE THE FOLLOWING MEDICATION(S) THE MORNING OF SURGERY WITH A SIP OF WATER: METOPROLOL (LOPRESSOR)           DO NOT TAKE YOUR RAMIPRIL (ALTACE) 24 HOURS PRIOR TO SURGERY          ALL OTHER HOME MEDICATION CHECK WITH YOUR PHYSICIAN (especially if you are taking diabetes medicines or blood thinners)    Do not take any Erectile Dysfunction medications (EX: CIALIS, VIAGRA) 24 hours prior to surgery.      If you were given and instructed to use a germ- killing soap, use as directed the night before surgery and again the morning of surgery or as directed by your surgeon.    (See attached information for How to Use Chlorhexidine for Bathing if applicable.)            Eating and drinking restrictions prior to scheduled arrival time    2 Hours before arrival time STOP   Drinking Clear liquids (water, apple juice-no pulp)     6 Hours before arrival time STOP   Milk or drinks that contain milk, full liquids    6 Hours before arrival time STOP   Light meals or foods, such as toast or cereal    8 Hours before arrival time STOP   Heavy foods, such as meat, fried foods, or fatty foods    (It is extremely important that you follow these guidelines to prevent delay or cancelation of your procedure)     Clear Liquids  Water and flavored water                                                                      Clear Fruit juices, such as cranberry juice and apple juice.  Black coffee (NO cream of any kind, including powdered).  Plain tea  Clear bouillon or broth.  Flavored gelatin.  Soda.  Gatorade or Powerade.  Full liquid examples  Juices that have pulp.  Frozen ice pops that contain fruit pieces.  Coffee with creamer  Milk.  Yogurt.                MANAGING PAIN AFTER SURGERY    We know you are probably wondering what your pain will be like after surgery.  Following surgery it is unrealistic to expect you will not have pain.   Pain is how our  bodies let us know that something is wrong or cautions us to be careful.  That said, our goal is to make your pain tolerable.    Methods we may use to treat your pain include (oral or IV medications, PCAs, epidurals, nerve blocks, etc.)   While some procedures require IV pain medications for a short time after surgery, transitioning to pain medications by mouth allows for better management of pain.   Your nurse will encourage you to take oral pain medications whenever possible.  IV medications work almost immediately, but only last a short while.  Taking medications by mouth allows for a more constant level of medication in your blood stream for a longer period of time.      Once your pain is out of control it is harder to get back under control.  It is important you are aware when your next dose of pain medication is due.  If you are admitted, your nurse may write the time of your next dose on the white board in your room to help you remember.      We are interested in your pain and encourage you to inform us about aggravating factors during your visit.   Many times a simple repositioning every few hours can make a big difference.    If your physician says it is okay, do not let your pain prevent you from getting out of bed. Be sure to call your nurse for assistance prior to getting up so you do not fall.      Before surgery, please decide your tolerable pain goal.  These faces help describe the pain ratings we use on a 0-10 scale.   Be prepared to tell us your goal and whether or not you take pain or anxiety medications at home.          Preparing for Surgery  Preparing for surgery is an important part of your care. It can make things go more smoothly and help you avoid complications. The steps leading up to surgery may vary among hospitals. Follow all instructions given to you by your health care providers. Ask questions if you do not understand something. Talk about any concerns that you have.  Here are some  questions to consider asking before your surgery:  If my surgery is not an emergency (is elective), when would be the best time to have the surgery?  What arrangements do I need to make for work, home, or school?  What will my recovery be like? How long will it be before I can return to normal activities?  Will I need to prepare my home? Will I need to arrange care for me or my children?  Should I expect to have pain after surgery? What are my pain management options? Are there nonmedical options that I can try for pain?  Tell a health care provider about:  Any allergies you have.  All medicines you are taking, including vitamins, herbs, eye drops, creams, and over-the-counter medicines.  Any problems you or family members have had with anesthetic medicines.  Any blood disorders you have.  Any surgeries you have had.  Any medical conditions you have.  Whether you are pregnant or may be pregnant.  What are the risks?  The risks and complications of surgery depend on the specific procedure that you have. Discuss all the risks with your health care providers before your surgery. Ask about common surgical complications, which may include:  Infection.  Bleeding or a need for blood replacement (transfusion).  Allergic reactions to medicines.  Damage to surrounding nerves, tissues, or structures.  A blood clot.  Scarring.  Failure of the surgery to correct the problem.  Follow these instructions before the procedure:  Several days or weeks before your procedure  You may have a physical exam by your primary health care provider to make sure it is safe for you to have surgery.  You may have testing. This may include a chest X-ray, blood and urine tests, electrocardiogram (ECG), or other testing.  Ask your health care provider about:  Changing or stopping your regular medicines. This is especially important if you are taking diabetes medicines or blood thinners.  Taking medicines such as aspirin and ibuprofen. These medicines  can thin your blood. Do not take these medicines unless your health care provider tells you to take them.  Taking over-the-counter medicines, vitamins, herbs, and supplements.  Do not use any products that contain nicotine or tobacco, such as cigarettes and e-cigarettes. If you need help quitting, ask your health care provider.  Avoid alcohol.  Ask your health care provider if there are exercises you can do to prepare for surgery.  Eat a healthy diet.   Plan to have someone take you home from the hospital or clinic.  Plan to have a responsible adult care for you for at least 24 hours after you leave the hospital or clinic. This is important.  The day before your procedure  You may be given antibiotic medicine to take by mouth to help prevent infection. Take it as told by your health care provider.  You may be asked to shower with a germ-killing soap.  Follow instructions from your health care provider about eating and drinking restrictions. This includes gum, mints and hard candy.  Pack comfortable clothes according to your procedure.   The day of your procedure  You may need to take another shower with a germ-killing soap before you leave home in the morning.  With a small sip of water, take only the medicines that you are told to take.  Remove all jewelry including rings.   Leave anything you consider valuable at home except hearing aids if needed.  Do not wear any makeup, nail polish, powder, deodorant, lotion, hair accessories, or anything on your skin or body except your clothes.  If you will be staying in the hospital, bring a case to hold your glasses, contacts, or dentures. You may also want to bring your robe and non-skid footwear.  If you wear oxygen at home, bring it with you the day of surgery.  If instructed by your health care provider, bring your sleep apnea device with you on the day of your surgery (if this applies to you).  You may want to leave your suitcase and sleep apnea device in the car until  after surgery.   Arrive at the hospital as scheduled.  Bring a friend or family member with you who can help to answer questions and be present while you meet with your health care provider.  At the hospital  When you arrive at the hospital:  Go to registration located at the main entrance of the hospital. You will be registered and given a beeper and a sticker sheet. Take the stickers to the Outpatient nurses desk and place in the black tray. This is to notify staff that you have arrived. Then return to the lobby to wait.   When your beeper lights up and vibrates proceed through the double doors, under the stairs, and a member of the Outpatient Surgery staff will escort you to your preoperative room.  You may have to wear compression sleeves. These help to prevent blood clots and reduce swelling in your legs.  An IV may be inserted into one of your veins.              In the operating room, you may be given one or more of the following:        A medicine to help you relax (sedative).        A medicine to numb the area (local anesthetic).        A medicine to make you fall asleep (general anesthetic).        A medicine that is injected into an area of your body to numb everything below the                      injection site (regional anesthetic).  You may be given an antibiotic through your IV to help prevent infection.  Your surgical site will be marked or identified.    Contact a health care provider if you:  Develop a fever of more than 100.4°F (38°C) or other feelings of illness during the 48 hours before your surgery.  Have symptoms that get worse.  Have questions or concerns about your surgery.  Summary  Preparing for surgery can make the procedure go more smoothly and lower your risk of complications.  Before surgery, make a list of questions and concerns to discuss with your surgeon. Ask about the risks and possible complications.  In the days or weeks before your surgery, follow all instructions from your  health care provider. You may need to stop smoking, avoid alcohol, follow eating restrictions, and change or stop your regular medicines.  Contact your surgeon if you develop a fever or other signs of illness during the few days before your surgery.  This information is not intended to replace advice given to you by your health care provider. Make sure you discuss any questions you have with your health care provider.  Document Revised: 12/21/2018 Document Reviewed: 10/23/2018  [employer] Patient Education © 2021 [employer]

## 2022-10-05 NOTE — PROGRESS NOTES
Patient: Logan Mayen    YOB: 1960    Date: 10/05/2022    Primary Care Provider: Logan Bryant MD    Chief Complaint   Patient presents with   • appendix     Pt here for issues with appendix       Subjective .     History of present illness:  Mr. Mayen is a 62 y.o. who is here for evaluation of appendix and gallbladder.  This is a very nice 62-year-old gentleman that I saw and evaluated in 2020, he had 3 or 4 episodes of abdominal pain right side had 3 or 4 instances of this and in fact had probable appendicitis in the past and had a periappendiceal abscess.  This was very extensive he did well with treatment he had resolution of the inflammation he had a follow-up CT scan that showed resolution he was given 1 week of IV antibiotics and then Levaquin he is done well he has had no problems since this time, also after this time he also had some upper abdominal pain and noted to have a reduced ejection fraction of 38%.  He continues with some very minimal abdominal pain he had cardiac event and had 4 vessel bypass in the past year and occasionally his mind wanders about what is going to happen with his appendix I advised him approximately 50% chance that this could become a problem for him in the future as well as his gallbladder gave him problems in the past and could very well in the future.  We can continue to watch this or be proactive in treatment of this and he desires to be proactive in treatment of this previously inflamed appendix with an abscess present and also laparoscopic cholecystectomy and treatment of this intermittent midepigastric right upper quadrant pain with a marginal ejection fraction of 38% plus or minus pain with the injection of CCK.  He is aware of the procedure its risk and benefits and with full knowledge of this and apparent understanding he gives his informed consent for surgery plan for surgical intervention on 11 October    .IMPRESSION:  1. Previously  noted rather extensive phlegmonous changes within the  right mid and upper abdomen have shown marked interval improvement from  the previous study. I suspect this is related to a ruptured appendix  with a tubular fluid-filled structure extending back down to the level  of the cecum although not visualized in its distal aspect. This does  project into the right upper quadrant. There is associated inflammatory  stranding within the adjacent mesentery and also extending back down to  the base of the cecum. Previously noted abscess has shown marked  interval decrease in size now only containing a small amount of air. No  drainable fluid collection is present. There are some phlegmonous  changes associated with a portion of the sigmoid colon which also  projects in the right upper quadrant and is redundant. There is no  associated obstruction or pneumoperitoneum.  2. Bibasilar atelectasis.  3. Coronary calcifications are present.  4. Mild steatosis of the liver.  5. Small fat-containing periumbilical hernia.  6. Enlargement of the prostate gland..            Past surgical history significant for left hip replacement 2019, cardiac cath with 4 stents, subsequent CABG in August 2000,    Medical problems significant for hypertension, coronary artery disease, elevated lipids, hypertension, previous myocardial infarction.    Medications are those listed, he is not on any blood thinners other than Plavix and we will take him off Plavix 4 days before the procedure.      Review of Systems    History:  Past Medical History:   Diagnosis Date   • Coronary artery disease    • Hyperlipidemia    • Hypertension    • Myocardial infarction (HCC)           Past Surgical History:   Procedure Laterality Date   • CARDIAC CATHETERIZATION     • CARDIAC CATHETERIZATION N/A 08/10/2021    Procedure: Left Heart Cath;  Surgeon: Alessio Chung MD;  Location: [facility] CATH INVASIVE LOCATION;  Service: Cardiovascular;  Laterality: N/A;   • CATARACT  EXTRACTION W/ INTRAOCULAR LENS  IMPLANT, BILATERAL     • COLONOSCOPY     • CORONARY ARTERY BYPASS GRAFT N/A 08/10/2021    Procedure: CORONARY ARTERY BYPASS GRAFT X 4 WITH LEFT INTERNAL MAMMARY ARTERY, RIGHT LOWER EXTREMITY ENDOSCOPIC VEIN HARVEST, BILATERAL LOWER EXTREMITY OPEN VEIN HARVEST, AND PERFUSION; TRANSESOPHAGEAL ECHOCARDIOGRAM;  Surgeon: Zana Hernandez MD;  Location: [facility] OR;  Service: Cardiothoracic;  Laterality: N/A;   • CORONARY STENT PLACEMENT     • HIP ARTHROPLASTY Left 2019   • PERIPHERAL ARTERIAL STENT GRAFT         Family History   Problem Relation Age of Onset   • Lung disease Mother    • Heart attack Father    • Heart disease Father    • Lung disease Sister    • Rheum arthritis Sister    • Cancer Sister    • Rheum arthritis Sister    • Liver disease Sister        Social History     Tobacco Use   • Smoking status: Never Smoker   • Smokeless tobacco: Never Used   Vaping Use   • Vaping Use: Never used   Substance Use Topics   • Alcohol use: Yes     Alcohol/week: 6.0 standard drinks     Types: 6 Cans of beer per week     Comment: 2-3 x a week   • Drug use: Yes     Types: Marijuana     Comment: sometimes       Allergies:  No Known Allergies    Medications:     Current Outpatient Medications:   •  aspirin 81 MG chewable tablet, Chew 81 mg Daily., Disp: , Rfl:   •  atorvastatin (LIPITOR) 40 MG tablet, Take 1 tablet by mouth Every Night., Disp: 30 tablet, Rfl: 2  •  Cholecalciferol (VITAMIN D3) 125 MCG (5000 UT) capsule capsule, Take 5,000 Units by mouth Daily., Disp: , Rfl:   •  clopidogrel (PLAVIX) 75 MG tablet, Take 1 tablet by mouth Daily., Disp: 90 tablet, Rfl: 4  •  Garlic 100 MG tablet, Take 400 mg by mouth Daily., Disp: , Rfl:   •  Krill Oil 350 MG capsule, Take 2 tablets by mouth Daily., Disp: , Rfl:   •  metoprolol tartrate (LOPRESSOR) 25 MG tablet, Take 1 tablet by mouth 2 (Two) Times a Day., Disp: 180 tablet, Rfl: 4  •  ramipril (ALTACE) 1.25 MG capsule, Take 1 capsule by mouth Daily.,  "Disp: 30 capsule, Rfl: 0  •  rosuvastatin (CRESTOR) 10 MG tablet, Every Night., Disp: , Rfl:   •  sildenafil (REVATIO) 20 MG tablet, Take 40 mg by mouth Daily As Needed. Take 2 tablets daily as needed for erectile dysfunction., Disp: , Rfl:   •  Turmeric 500 MG capsule, Take 4 tablets by mouth Daily., Disp: , Rfl:     Objective     Vital Signs:   Vitals:    10/05/22 1107   BP: 120/70   Weight: 80.7 kg (178 lb)   Height: 170.2 cm (67\")       Physical Exam:     General Appearance:    Alert, cooperative, in no acute distress   Head:    Normocephalic, without obvious abnormality, atraumatic   Eyes:            Lids and lashes normal, conjunctivae and sclerae normal, no   icterus, no pallor, corneas clear,   Ears:    Ears appear intact with no abnormalities noted   Throat:   No oral lesions, no thrush, oral mucosa moist       Lungs:     Clear to auscultation,respirations regular, even and                  Unlabored    Heart:    Regular rhythm and normal rate, no murmur, no gallop.   Chest Wall:    No abnormalities observed   Abdomen:    Abdomen is soft, flat, occasional bowel sounds, no masses.  No sign of any infection.  No hernia.  Well-healed incision from his median sternotomy, also his chest tube placement.  No palpable gallbladder, no pain or discomfort in his right lower quadrant.  Most recent white count is 5, hematocrit is 45, liver functions are normal.  PT of 15.   Extremities:   Moves all extremities well, no edema, no cyanosis, no             redness   Pulses:   Pulses palpable and equal bilaterally   Skin:   No bleeding, bruising or rash   Lymph nodes:   No palpable adenopathy   Neurologic:   Cranial nerves 2 - 12 grossly intact.         Results Review:   I reviewed the patient's new clinical results.    Review of Systems was reviewed and confirmed as accurate as documented by the MA.    BMI is >= 25 and <30. (Overweight) The following options were offered after discussion;: exercise " counseling/recommendations  Assessment / Plan:    Diagnoses and all orders for this visit:    1. Ruptured appendicitis (Primary)  -     Case Request; Standing  -     CBC (No Diff); Future  -     Comprehensive Metabolic Panel; Future  -     Case Request    2. Biliary dyskinesia  -     Case Request; Standing  -     CBC (No Diff); Future  -     Comprehensive Metabolic Panel; Future  -     Case Request    3. Chronic appendicitis  -     Case Request; Standing  -     CBC (No Diff); Future  -     Comprehensive Metabolic Panel; Future  -     Case Request    Other orders  -     Follow Anesthesia Guidelines / Protocol; Future  -     Obtain Informed Consent; Future  -     Provide NPO Instructions to Patient; Future  -     Chlorhexidine Skin Prep; Future      Patient with history of very significant appendicitis with appendiceal abscess with desire to prevent further episodes also with marginal biliary function desires laparoscopic cholecystectomy and appendectomy, risk and benefits discussed with full knowledge of this and apparent understanding he gives his informed consent for surgery.  He works in the [employer] industry he is advised that he will be although well for 2 weeks, can return to light duty after 2 weeks, no lifting straining pulling tugging for a full month.  He appears to understand desires surgical intervention and we will look toward surgery on 11 October.      He will take half bottle mag citrate or 2 Dulcolax on Sunday and look toward surgical intervention on Monday he will stop his Plavix on 6 October.  And resume it on 12 October.    Electronically signed by Talat Moore MD  10/05/22  16:40 CDT                  Answers for HPI/ROS submitted by the patient on 10/5/2022  What is the primary reason for your visit?: Abdominal Pain  Chronicity: recurrent  Onset: more than 1 month ago  Onset quality: gradual  Frequency: intermittently  Progression since onset: unchanged  Pain location: right flank  Pain -  numeric: 1/10  Pain quality: dull  Radiates to: does not radiate  anorexia: No  belching: No  flatus: No  hematochezia: No  melena: No  weight loss: No  Aggravated by: bowel movement  Relieved by: bowel movements

## 2022-10-06 LAB
QT INTERVAL: 428 MS
QTC INTERVAL: 437 MS

## 2022-10-06 RX ORDER — METRONIDAZOLE 500 MG/100ML
500 INJECTION, SOLUTION INTRAVENOUS EVERY 6 HOURS
Status: COMPLETED | OUTPATIENT
Start: 2022-10-11 | End: 2022-10-11

## 2022-10-11 ENCOUNTER — ANESTHESIA EVENT (OUTPATIENT)
Dept: PERIOP | Facility: HOSPITAL | Age: 62
End: 2022-10-11

## 2022-10-11 ENCOUNTER — ANESTHESIA (OUTPATIENT)
Dept: PERIOP | Facility: HOSPITAL | Age: 62
End: 2022-10-11

## 2022-10-11 ENCOUNTER — HOSPITAL ENCOUNTER (OUTPATIENT)
Facility: HOSPITAL | Age: 62
Discharge: HOME | End: 2022-10-12
Attending: SPECIALIST | Admitting: SPECIALIST

## 2022-10-11 DIAGNOSIS — K35.32 RUPTURED APPENDICITIS: ICD-10-CM

## 2022-10-11 DIAGNOSIS — K36 CHRONIC APPENDICITIS: ICD-10-CM

## 2022-10-11 DIAGNOSIS — K82.8 BILIARY DYSKINESIA: ICD-10-CM

## 2022-10-11 PROCEDURE — 25010000002 CEFAZOLIN PER 500 MG: Performed by: SPECIALIST

## 2022-10-11 PROCEDURE — 25010000002 DEXAMETHASONE PER 1 MG: Performed by: NURSE ANESTHETIST, CERTIFIED REGISTERED

## 2022-10-11 PROCEDURE — S0260 H&P FOR SURGERY: HCPCS | Performed by: SPECIALIST

## 2022-10-11 PROCEDURE — 25010000002 LORAZEPAM PER 2 MG: Performed by: SPECIALIST

## 2022-10-11 PROCEDURE — 25010000002 PROPOFOL 10 MG/ML EMULSION: Performed by: NURSE ANESTHETIST, CERTIFIED REGISTERED

## 2022-10-11 PROCEDURE — 25010000002 CEFOXITIN PER 1 G: Performed by: SPECIALIST

## 2022-10-11 PROCEDURE — 25010000002 ONDANSETRON PER 1 MG: Performed by: NURSE ANESTHETIST, CERTIFIED REGISTERED

## 2022-10-11 PROCEDURE — 88304 TISSUE EXAM BY PATHOLOGIST: CPT | Performed by: SPECIALIST

## 2022-10-11 PROCEDURE — 25010000002 KETOROLAC TROMETHAMINE PER 15 MG: Performed by: NURSE ANESTHETIST, CERTIFIED REGISTERED

## 2022-10-11 PROCEDURE — G0378 HOSPITAL OBSERVATION PER HR: HCPCS

## 2022-10-11 PROCEDURE — 44970 LAPAROSCOPY APPENDECTOMY: CPT | Performed by: SPECIALIST

## 2022-10-11 PROCEDURE — 25010000002 FENTANYL CITRATE (PF) 100 MCG/2ML SOLUTION: Performed by: NURSE ANESTHETIST, CERTIFIED REGISTERED

## 2022-10-11 DEVICE — THE ECHELON, ECHELON ENDOPATH™ AND ECHELON FLEX™ FAMILIES OF ENDOSCOPIC LINEAR CUTTERS AND RELOADS ARE STERILE, SINGLE PATIENT USE INSTRUMENTS THAT SIMULTANEOUSLY CUT AND STAPLE TISSUE. THERE ARE SIX STAGGERED ROWS OF STAPLES, THREE ON EITHER SIDE OF THE CUT LINE. THE 45 MM INSTRUMENTS HAVE A STAPLE LINE THATIS APPROXIMATELY 45 MM LONG AND A CUT LINE THAT IS APPROXIMATELY 42 MM LONG. THE SHAFT CAN ROTATE FREELY IN BOTH DIRECTIONS AND AN ARTICULATION MECHANISM ON ARTICULATING INSTRUMENTS ENABLES BENDING THE DISTAL PORTIONOF THE SHAFT TO FACILITATE LATERAL ACCESS OF THE OPERATIVE SITE.THE INSTRUMENTS ARE SHIPPED WITHOUT A RELOAD AND MUST BE LOADED PRIOR TO USE. A STAPLE RETAINING CAP ON THE RELOAD PROTECTS THE STAPLE LEG POINTS DURING SHIPPING AND TRANSPORTATION. THE INSTRUMENTS’ LOCK-OUT FEATURE IS DESIGNED TO PREVENT A USED RELOAD FROM BEING REFIRED.
Type: IMPLANTABLE DEVICE | Site: ABDOMEN | Status: FUNCTIONAL
Brand: ECHELON ENDOPATH

## 2022-10-11 RX ORDER — BUPIVACAINE HYDROCHLORIDE AND EPINEPHRINE 5; 5 MG/ML; UG/ML
INJECTION, SOLUTION PERINEURAL AS NEEDED
Status: DISCONTINUED | OUTPATIENT
Start: 2022-10-11 | End: 2022-10-11 | Stop reason: HOSPADM

## 2022-10-11 RX ORDER — FENTANYL CITRATE 50 UG/ML
25 INJECTION, SOLUTION INTRAMUSCULAR; INTRAVENOUS
Status: DISCONTINUED | OUTPATIENT
Start: 2022-10-11 | End: 2022-10-11 | Stop reason: HOSPADM

## 2022-10-11 RX ORDER — SIMETHICONE 80 MG
80 TABLET,CHEWABLE ORAL 4 TIMES DAILY PRN
Status: DISCONTINUED | OUTPATIENT
Start: 2022-10-11 | End: 2022-10-12 | Stop reason: HOSPADM

## 2022-10-11 RX ORDER — NEOSTIGMINE METHYLSULFATE 5 MG/5 ML
SYRINGE (ML) INTRAVENOUS AS NEEDED
Status: DISCONTINUED | OUTPATIENT
Start: 2022-10-11 | End: 2022-10-11 | Stop reason: SURG

## 2022-10-11 RX ORDER — KETOROLAC TROMETHAMINE 30 MG/ML
INJECTION, SOLUTION INTRAMUSCULAR; INTRAVENOUS AS NEEDED
Status: DISCONTINUED | OUTPATIENT
Start: 2022-10-11 | End: 2022-10-11 | Stop reason: SURG

## 2022-10-11 RX ORDER — LABETALOL HYDROCHLORIDE 5 MG/ML
5 INJECTION, SOLUTION INTRAVENOUS
Status: DISCONTINUED | OUTPATIENT
Start: 2022-10-11 | End: 2022-10-11 | Stop reason: HOSPADM

## 2022-10-11 RX ORDER — ROCURONIUM BROMIDE 10 MG/ML
INJECTION, SOLUTION INTRAVENOUS AS NEEDED
Status: DISCONTINUED | OUTPATIENT
Start: 2022-10-11 | End: 2022-10-11 | Stop reason: SURG

## 2022-10-11 RX ORDER — PROPOFOL 10 MG/ML
VIAL (ML) INTRAVENOUS AS NEEDED
Status: DISCONTINUED | OUTPATIENT
Start: 2022-10-11 | End: 2022-10-11 | Stop reason: SURG

## 2022-10-11 RX ORDER — SODIUM CHLORIDE 9 MG/ML
100 INJECTION, SOLUTION INTRAVENOUS CONTINUOUS
Status: DISCONTINUED | OUTPATIENT
Start: 2022-10-11 | End: 2022-10-12 | Stop reason: HOSPADM

## 2022-10-11 RX ORDER — ACETAMINOPHEN 500 MG
1000 TABLET ORAL ONCE
Status: COMPLETED | OUTPATIENT
Start: 2022-10-11 | End: 2022-10-11

## 2022-10-11 RX ORDER — DROPERIDOL 2.5 MG/ML
0.62 INJECTION, SOLUTION INTRAMUSCULAR; INTRAVENOUS ONCE AS NEEDED
Status: DISCONTINUED | OUTPATIENT
Start: 2022-10-11 | End: 2022-10-11 | Stop reason: HOSPADM

## 2022-10-11 RX ORDER — SODIUM CHLORIDE 0.9 % (FLUSH) 0.9 %
3 SYRINGE (ML) INJECTION AS NEEDED
Status: DISCONTINUED | OUTPATIENT
Start: 2022-10-11 | End: 2022-10-11 | Stop reason: HOSPADM

## 2022-10-11 RX ORDER — NALOXONE HCL 0.4 MG/ML
0.4 VIAL (ML) INJECTION AS NEEDED
Status: DISCONTINUED | OUTPATIENT
Start: 2022-10-11 | End: 2022-10-11 | Stop reason: HOSPADM

## 2022-10-11 RX ORDER — SUCRALFATE 1 G/1
1 TABLET ORAL
Status: DISCONTINUED | OUTPATIENT
Start: 2022-10-11 | End: 2022-10-12 | Stop reason: HOSPADM

## 2022-10-11 RX ORDER — OXYCODONE AND ACETAMINOPHEN 10; 325 MG/1; MG/1
1 TABLET ORAL ONCE AS NEEDED
Status: DISCONTINUED | OUTPATIENT
Start: 2022-10-11 | End: 2022-10-11 | Stop reason: HOSPADM

## 2022-10-11 RX ORDER — MAGNESIUM HYDROXIDE 1200 MG/15ML
LIQUID ORAL AS NEEDED
Status: DISCONTINUED | OUTPATIENT
Start: 2022-10-11 | End: 2022-10-11 | Stop reason: HOSPADM

## 2022-10-11 RX ORDER — ONDANSETRON 8 MG/1
8 TABLET, ORALLY DISINTEGRATING ORAL EVERY 6 HOURS PRN
Status: DISCONTINUED | OUTPATIENT
Start: 2022-10-11 | End: 2022-10-12 | Stop reason: HOSPADM

## 2022-10-11 RX ORDER — ONDANSETRON 2 MG/ML
4 INJECTION INTRAMUSCULAR; INTRAVENOUS EVERY 6 HOURS PRN
Status: DISCONTINUED | OUTPATIENT
Start: 2022-10-11 | End: 2022-10-12 | Stop reason: HOSPADM

## 2022-10-11 RX ORDER — SODIUM CHLORIDE, SODIUM LACTATE, POTASSIUM CHLORIDE, CALCIUM CHLORIDE 600; 310; 30; 20 MG/100ML; MG/100ML; MG/100ML; MG/100ML
100 INJECTION, SOLUTION INTRAVENOUS CONTINUOUS
Status: DISCONTINUED | OUTPATIENT
Start: 2022-10-11 | End: 2022-10-12 | Stop reason: HOSPADM

## 2022-10-11 RX ORDER — MORPHINE SULFATE 2 MG/ML
4 INJECTION, SOLUTION INTRAMUSCULAR; INTRAVENOUS
Status: DISCONTINUED | OUTPATIENT
Start: 2022-10-11 | End: 2022-10-12 | Stop reason: HOSPADM

## 2022-10-11 RX ORDER — SODIUM CHLORIDE 0.9 % (FLUSH) 0.9 %
3-10 SYRINGE (ML) INJECTION AS NEEDED
Status: DISCONTINUED | OUTPATIENT
Start: 2022-10-11 | End: 2022-10-11 | Stop reason: HOSPADM

## 2022-10-11 RX ORDER — EPHEDRINE SULFATE 50 MG/ML
INJECTION INTRAVENOUS AS NEEDED
Status: DISCONTINUED | OUTPATIENT
Start: 2022-10-11 | End: 2022-10-11 | Stop reason: SURG

## 2022-10-11 RX ORDER — ONDANSETRON 2 MG/ML
INJECTION INTRAMUSCULAR; INTRAVENOUS AS NEEDED
Status: DISCONTINUED | OUTPATIENT
Start: 2022-10-11 | End: 2022-10-11 | Stop reason: SURG

## 2022-10-11 RX ORDER — LORAZEPAM 2 MG/ML
1 INJECTION INTRAMUSCULAR EVERY 4 HOURS PRN
Status: DISCONTINUED | OUTPATIENT
Start: 2022-10-11 | End: 2022-10-12 | Stop reason: HOSPADM

## 2022-10-11 RX ORDER — BUPIVACAINE HCL/0.9 % NACL/PF 0.125 %
PLASTIC BAG, INJECTION (ML) EPIDURAL AS NEEDED
Status: DISCONTINUED | OUTPATIENT
Start: 2022-10-11 | End: 2022-10-11 | Stop reason: SURG

## 2022-10-11 RX ORDER — LIDOCAINE HYDROCHLORIDE 10 MG/ML
0.5 INJECTION, SOLUTION EPIDURAL; INFILTRATION; INTRACAUDAL; PERINEURAL ONCE AS NEEDED
Status: DISCONTINUED | OUTPATIENT
Start: 2022-10-11 | End: 2022-10-11 | Stop reason: HOSPADM

## 2022-10-11 RX ORDER — DEXAMETHASONE SODIUM PHOSPHATE 4 MG/ML
INJECTION, SOLUTION INTRA-ARTICULAR; INTRALESIONAL; INTRAMUSCULAR; INTRAVENOUS; SOFT TISSUE AS NEEDED
Status: DISCONTINUED | OUTPATIENT
Start: 2022-10-11 | End: 2022-10-11 | Stop reason: SURG

## 2022-10-11 RX ORDER — METRONIDAZOLE 500 MG/100ML
500 INJECTION, SOLUTION INTRAVENOUS EVERY 6 HOURS
Status: COMPLETED | OUTPATIENT
Start: 2022-10-11 | End: 2022-10-12

## 2022-10-11 RX ORDER — METOPROLOL TARTRATE 5 MG/5ML
INJECTION INTRAVENOUS AS NEEDED
Status: DISCONTINUED | OUTPATIENT
Start: 2022-10-11 | End: 2022-10-11 | Stop reason: SURG

## 2022-10-11 RX ORDER — FLUMAZENIL 0.1 MG/ML
0.2 INJECTION INTRAVENOUS AS NEEDED
Status: DISCONTINUED | OUTPATIENT
Start: 2022-10-11 | End: 2022-10-11 | Stop reason: HOSPADM

## 2022-10-11 RX ORDER — SODIUM CHLORIDE 9 MG/ML
INJECTION, SOLUTION INTRAVENOUS AS NEEDED
Status: DISCONTINUED | OUTPATIENT
Start: 2022-10-11 | End: 2022-10-11 | Stop reason: HOSPADM

## 2022-10-11 RX ORDER — IBUPROFEN 600 MG/1
600 TABLET ORAL ONCE AS NEEDED
Status: DISCONTINUED | OUTPATIENT
Start: 2022-10-11 | End: 2022-10-11 | Stop reason: HOSPADM

## 2022-10-11 RX ORDER — DEXTROSE AND SODIUM CHLORIDE 5; .45 G/100ML; G/100ML
125 INJECTION, SOLUTION INTRAVENOUS CONTINUOUS
Status: DISCONTINUED | OUTPATIENT
Start: 2022-10-11 | End: 2022-10-12 | Stop reason: HOSPADM

## 2022-10-11 RX ORDER — FENTANYL CITRATE 50 UG/ML
INJECTION, SOLUTION INTRAMUSCULAR; INTRAVENOUS AS NEEDED
Status: DISCONTINUED | OUTPATIENT
Start: 2022-10-11 | End: 2022-10-11 | Stop reason: SURG

## 2022-10-11 RX ORDER — MIDAZOLAM HYDROCHLORIDE 1 MG/ML
1 INJECTION INTRAMUSCULAR; INTRAVENOUS
Status: DISCONTINUED | OUTPATIENT
Start: 2022-10-11 | End: 2022-10-11 | Stop reason: HOSPADM

## 2022-10-11 RX ORDER — LIDOCAINE HYDROCHLORIDE 20 MG/ML
INJECTION, SOLUTION EPIDURAL; INFILTRATION; INTRACAUDAL; PERINEURAL AS NEEDED
Status: DISCONTINUED | OUTPATIENT
Start: 2022-10-11 | End: 2022-10-11 | Stop reason: SURG

## 2022-10-11 RX ORDER — SODIUM CHLORIDE, SODIUM LACTATE, POTASSIUM CHLORIDE, CALCIUM CHLORIDE 600; 310; 30; 20 MG/100ML; MG/100ML; MG/100ML; MG/100ML
1000 INJECTION, SOLUTION INTRAVENOUS CONTINUOUS
Status: DISCONTINUED | OUTPATIENT
Start: 2022-10-11 | End: 2022-10-12 | Stop reason: HOSPADM

## 2022-10-11 RX ORDER — ONDANSETRON 2 MG/ML
4 INJECTION INTRAMUSCULAR; INTRAVENOUS ONCE AS NEEDED
Status: DISCONTINUED | OUTPATIENT
Start: 2022-10-11 | End: 2022-10-11 | Stop reason: HOSPADM

## 2022-10-11 RX ORDER — OXYCODONE AND ACETAMINOPHEN 7.5; 325 MG/1; MG/1
2 TABLET ORAL EVERY 4 HOURS PRN
Status: DISCONTINUED | OUTPATIENT
Start: 2022-10-11 | End: 2022-10-11 | Stop reason: HOSPADM

## 2022-10-11 RX ORDER — ENOXAPARIN SODIUM 100 MG/ML
30 INJECTION SUBCUTANEOUS EVERY 12 HOURS
Status: DISCONTINUED | OUTPATIENT
Start: 2022-10-12 | End: 2022-10-12 | Stop reason: HOSPADM

## 2022-10-11 RX ORDER — SODIUM CHLORIDE 0.9 % (FLUSH) 0.9 %
3 SYRINGE (ML) INJECTION EVERY 12 HOURS SCHEDULED
Status: DISCONTINUED | OUTPATIENT
Start: 2022-10-11 | End: 2022-10-11 | Stop reason: HOSPADM

## 2022-10-11 RX ADMIN — Medication 100 MCG: at 11:48

## 2022-10-11 RX ADMIN — METRONIDAZOLE 500 MG: 500 INJECTION, SOLUTION INTRAVENOUS at 22:09

## 2022-10-11 RX ADMIN — ACETAMINOPHEN 1000 MG: 500 TABLET ORAL at 10:21

## 2022-10-11 RX ADMIN — KETOROLAC TROMETHAMINE 30 MG: 30 INJECTION, SOLUTION INTRAMUSCULAR; INTRAVENOUS at 12:46

## 2022-10-11 RX ADMIN — METRONIDAZOLE 500 MG: 500 INJECTION, SOLUTION INTRAVENOUS at 16:29

## 2022-10-11 RX ADMIN — METOPROLOL TARTRATE 1 MG: 1 INJECTION, SOLUTION INTRAVENOUS at 12:20

## 2022-10-11 RX ADMIN — SUCRALFATE 1 G: 1 TABLET ORAL at 20:13

## 2022-10-11 RX ADMIN — FENTANYL CITRATE 100 MCG: 50 INJECTION INTRAMUSCULAR; INTRAVENOUS at 12:42

## 2022-10-11 RX ADMIN — LORAZEPAM 1 MG: 2 INJECTION INTRAMUSCULAR; INTRAVENOUS at 22:16

## 2022-10-11 RX ADMIN — GLYCOPYRROLATE 0.4 MG: 0.2 INJECTION INTRAMUSCULAR; INTRAVENOUS at 12:31

## 2022-10-11 RX ADMIN — METRONIDAZOLE 500 MG: 500 INJECTION, SOLUTION INTRAVENOUS at 10:24

## 2022-10-11 RX ADMIN — SODIUM CHLORIDE, POTASSIUM CHLORIDE, SODIUM LACTATE AND CALCIUM CHLORIDE 1000 ML: 600; 310; 30; 20 INJECTION, SOLUTION INTRAVENOUS at 09:58

## 2022-10-11 RX ADMIN — FENTANYL CITRATE 100 MCG: 50 INJECTION INTRAMUSCULAR; INTRAVENOUS at 11:33

## 2022-10-11 RX ADMIN — ONDANSETRON 4 MG: 2 INJECTION INTRAMUSCULAR; INTRAVENOUS at 12:30

## 2022-10-11 RX ADMIN — LIDOCAINE HYDROCHLORIDE 100 MG: 20 INJECTION, SOLUTION EPIDURAL; INFILTRATION; INTRACAUDAL; PERINEURAL at 11:34

## 2022-10-11 RX ADMIN — Medication 4 MG: at 12:31

## 2022-10-11 RX ADMIN — PROPOFOL 200 MG: 10 INJECTION, EMULSION INTRAVENOUS at 11:34

## 2022-10-11 RX ADMIN — PROPOFOL 30 MG: 10 INJECTION, EMULSION INTRAVENOUS at 12:20

## 2022-10-11 RX ADMIN — SODIUM CHLORIDE 2 G: 9 INJECTION, SOLUTION INTRAVENOUS at 20:13

## 2022-10-11 RX ADMIN — Medication 100 MCG: at 11:43

## 2022-10-11 RX ADMIN — EPHEDRINE SULFATE 10 MG: 50 INJECTION INTRAVENOUS at 11:55

## 2022-10-11 RX ADMIN — DEXAMETHASONE SODIUM PHOSPHATE 8 MG: 4 INJECTION, SOLUTION INTRA-ARTICULAR; INTRALESIONAL; INTRAMUSCULAR; INTRAVENOUS; SOFT TISSUE at 11:40

## 2022-10-11 RX ADMIN — OXYCODONE HYDROCHLORIDE AND ACETAMINOPHEN 2 TABLET: 7.5; 325 TABLET ORAL at 13:23

## 2022-10-11 RX ADMIN — ROCURONIUM BROMIDE 50 MG: 10 SOLUTION INTRAVENOUS at 11:34

## 2022-10-11 NOTE — H&P
Pt here for issues with appendix         History and physical examination.   .      History of present illness:  Mr. Mayen is a 62 y.o. who is here for evaluation of appendix and gallbladder.  This is a very nice 62-year-old gentleman that I saw and evaluated in 2020, he had 3 or 4 episodes of abdominal pain right side had 3 or 4 instances of this and in fact had probable appendicitis in the past and had a periappendiceal abscess.  This was very extensive he did well with treatment he had resolution of the inflammation he had a follow-up CT scan that showed resolution he was given 1 week of IV antibiotics and then Levaquin he is done well he has had no problems since this time, also after this time he also had some upper abdominal pain and noted to have a reduced ejection fraction of 38%.  He continues with some very minimal abdominal pain he had cardiac event and had 4 vessel bypass in the past year and occasionally his mind wanders about what is going to happen with his appendix I advised him approximately 50% chance that this could become a problem for him in the future as well as his gallbladder gave him problems in the past and could very well in the future.  We can continue to watch this or be proactive in treatment of this and he desires to be proactive in treatment of this previously inflamed appendix with an abscess present and also laparoscopic cholecystectomy and treatment of this intermittent midepigastric right upper quadrant pain with a marginal ejection fraction of 38% plus or minus pain with the injection of CCK.  He is aware of the procedure its risk and benefits and with full knowledge of this and apparent understanding he gives his informed consent for surgery plan for surgical intervention on 11 October     .IMPRESSION:  1. Previously noted rather extensive phlegmonous changes within the  right mid and upper abdomen have shown marked interval improvement from  the previous study. I  suspect this is related to a ruptured appendix  with a tubular fluid-filled structure extending back down to the level  of the cecum although not visualized in its distal aspect. This does  project into the right upper quadrant. There is associated inflammatory  stranding within the adjacent mesentery and also extending back down to  the base of the cecum. Previously noted abscess has shown marked  interval decrease in size now only containing a small amount of air. No  drainable fluid collection is present. There are some phlegmonous  changes associated with a portion of the sigmoid colon which also  projects in the right upper quadrant and is redundant. There is no  associated obstruction or pneumoperitoneum.  2. Bibasilar atelectasis.  3. Coronary calcifications are present.  4. Mild steatosis of the liver.  5. Small fat-containing periumbilical hernia.  6. Enlargement of the prostate gland..            Past surgical history significant for left hip replacement 2019, cardiac cath with 4 stents, subsequent CABG in August 2000,     Medical problems significant for hypertension, coronary artery disease, elevated lipids, hypertension, previous myocardial infarction.     Medications are those listed, he is not on any blood thinners other than Plavix and we will take him off Plavix 4 days before the procedure.        Review of Systems     History:  Medical History        Past Medical History:   Diagnosis Date   • Coronary artery disease     • Hyperlipidemia     • Hypertension     • Myocardial infarction (HCC)                 Surgical History         Past Surgical History:   Procedure Laterality Date   • CARDIAC CATHETERIZATION       • CARDIAC CATHETERIZATION N/A 08/10/2021     Procedure: Left Heart Cath;  Surgeon: Alessio Chung MD;  Location:  [facility] CATH INVASIVE LOCATION;  Service: Cardiovascular;  Laterality: N/A;   • CATARACT EXTRACTION W/ INTRAOCULAR LENS  IMPLANT, BILATERAL       • COLONOSCOPY       • CORONARY  ARTERY BYPASS GRAFT N/A 08/10/2021     Procedure: CORONARY ARTERY BYPASS GRAFT X 4 WITH LEFT INTERNAL MAMMARY ARTERY, RIGHT LOWER EXTREMITY ENDOSCOPIC VEIN HARVEST, BILATERAL LOWER EXTREMITY OPEN VEIN HARVEST, AND PERFUSION; TRANSESOPHAGEAL ECHOCARDIOGRAM;  Surgeon: Zana Hernandez MD;  Location: [facility] OR;  Service: Cardiothoracic;  Laterality: N/A;   • CORONARY STENT PLACEMENT       • HIP ARTHROPLASTY Left 2019   • PERIPHERAL ARTERIAL STENT GRAFT                      Family History   Problem Relation Age of Onset   • Lung disease Mother     • Heart attack Father     • Heart disease Father     • Lung disease Sister     • Rheum arthritis Sister     • Cancer Sister     • Rheum arthritis Sister     • Liver disease Sister           Social History            Tobacco Use   • Smoking status: Never Smoker   • Smokeless tobacco: Never Used   Vaping Use   • Vaping Use: Never used   Substance Use Topics   • Alcohol use: Yes       Alcohol/week: 6.0 standard drinks       Types: 6 Cans of beer per week       Comment: 2-3 x a week   • Drug use: Yes       Types: Marijuana       Comment: sometimes         Allergies:  No Known Allergies     Medications:      Current Outpatient Medications:   •  aspirin 81 MG chewable tablet, Chew 81 mg Daily., Disp: , Rfl:   •  atorvastatin (LIPITOR) 40 MG tablet, Take 1 tablet by mouth Every Night., Disp: 30 tablet, Rfl: 2  •  Cholecalciferol (VITAMIN D3) 125 MCG (5000 UT) capsule capsule, Take 5,000 Units by mouth Daily., Disp: , Rfl:   •  clopidogrel (PLAVIX) 75 MG tablet, Take 1 tablet by mouth Daily., Disp: 90 tablet, Rfl: 4  •  Garlic 100 MG tablet, Take 400 mg by mouth Daily., Disp: , Rfl:   •  Krill Oil 350 MG capsule, Take 2 tablets by mouth Daily., Disp: , Rfl:   •  metoprolol tartrate (LOPRESSOR) 25 MG tablet, Take 1 tablet by mouth 2 (Two) Times a Day., Disp: 180 tablet, Rfl: 4  •  ramipril (ALTACE) 1.25 MG capsule, Take 1 capsule by mouth Daily., Disp: 30 capsule, Rfl: 0  •   "rosuvastatin (CRESTOR) 10 MG tablet, Every Night., Disp: , Rfl:   •  sildenafil (REVATIO) 20 MG tablet, Take 40 mg by mouth Daily As Needed. Take 2 tablets daily as needed for erectile dysfunction., Disp: , Rfl:   •  Turmeric 500 MG capsule, Take 4 tablets by mouth Daily., Disp: , Rfl:            Objective         Vital Signs:   Vitals       Vitals:     10/05/22 1107   BP: 120/70   Weight: 80.7 kg (178 lb)   Height: 170.2 cm (67\")            Physical Exam:                General Appearance:    Alert, cooperative, in no acute distress   Head:    Normocephalic, without obvious abnormality, atraumatic   Eyes:            Lids and lashes normal, conjunctivae and sclerae normal, no   icterus, no pallor, corneas clear,   Ears:    Ears appear intact with no abnormalities noted   Throat:   No oral lesions, no thrush, oral mucosa moist         Lungs:     Clear to auscultation,respirations regular, even and                  Unlabored    Heart:    Regular rhythm and normal rate, no murmur, no gallop.   Chest Wall:    No abnormalities observed   Abdomen:    Abdomen is soft, flat, occasional bowel sounds, no masses.  No sign of any infection.  No hernia.  Well-healed incision from his median sternotomy, also his chest tube placement.  No palpable gallbladder, no pain or discomfort in his right lower quadrant.  Most recent white count is 5, hematocrit is 45, liver functions are normal.  PT of 15.   Extremities:   Moves all extremities well, no edema, no cyanosis, no             redness   Pulses:   Pulses palpable and equal bilaterally   Skin:   No bleeding, bruising or rash   Lymph nodes:   No palpable adenopathy   Neurologic:   Cranial nerves 2 - 12 grossly intact.            Results Review:              I reviewed the patient's new clinical results.     Review of Systems was reviewed and confirmed as accurate as documented by the MA.     BMI is >= 25 and <30. (Overweight) The following options were offered after discussion;: " exercise counseling/recommendations  Assessment / Plan:     Diagnoses and all orders for this visit:     1. Ruptured appendicitis (Primary)  -     Case Request; Standing  -     CBC (No Diff); Future  -     Comprehensive Metabolic Panel; Future  -     Case Request     2. Biliary dyskinesia  -     Case Request; Standing  -     CBC (No Diff); Future  -     Comprehensive Metabolic Panel; Future  -     Case Request     3. Chronic appendicitis  -     Case Request; Standing  -     CBC (No Diff); Future  -     Comprehensive Metabolic Panel; Future  -     Case Request     Other orders  -     Follow Anesthesia Guidelines / Protocol; Future  -     Obtain Informed Consent; Future  -     Provide NPO Instructions to Patient; Future  -     Chlorhexidine Skin Prep; Future        Patient with history of very significant appendicitis with appendiceal abscess with desire to prevent further episodes also with marginal biliary function desires laparoscopic cholecystectomy and appendectomy, risk and benefits discussed with full knowledge of this and apparent understanding he gives his informed consent for surgery.  He works in the [employer] industry he is advised that he will be although well for 2 weeks, can return to light duty after 2 weeks, no lifting straining pulling tugging for a full month.  He appears to understand desires surgical intervention and we will look toward surgery on 11 October.  After further discussion he does not desire the gallbladder to be removed the appendix only we will look toward laparoscopic appendectomy on 11 October.     He will take half bottle mag citrate or 2 Dulcolax on Sunday and look toward surgical intervention on Monday he will stop his Plavix on 6 October.  And resume it on 12 October.     Electronically signed by Talat Moore MD  10/05/22

## 2022-10-11 NOTE — ANESTHESIA POSTPROCEDURE EVALUATION
Patient: Logan Mayen    Procedure Summary     Date: 10/11/22 Room / Location:  [facility] OR  /  [facility] OR    Anesthesia Start: 1131 Anesthesia Stop: 1256    Procedure: APPENDECTOMY LAPAROSCOPIC (Abdomen) Diagnosis:       Ruptured appendicitis      Biliary dyskinesia      Chronic appendicitis      (Ruptured appendicitis [K35.32])      (Biliary dyskinesia [K82.8])      (Chronic appendicitis [K36])    Surgeons: Talat Moore MD Provider: Andrez England CRNA    Anesthesia Type: general ASA Status: 3          Anesthesia Type: general    Vitals  Vitals Value Taken Time   /86 10/11/22 1330   Temp 97.5 °F (36.4 °C) 10/11/22 1330   Pulse 55 10/11/22 1336   Resp 16 10/11/22 1330   SpO2 94 % 10/11/22 1335   Vitals shown include unvalidated device data.        Post Anesthesia Care and Evaluation    Patient location during evaluation: PACU  Patient participation: complete - patient participated  Level of consciousness: awake and alert  Pain management: adequate    Airway patency: patent  Anesthetic complications: No anesthetic complications    Cardiovascular status: acceptable  Respiratory status: acceptable  Hydration status: acceptable    Comments: Blood pressure 154/93, pulse 51, temperature 97.4 °F (36.3 °C), temperature source Oral, resp. rate 18, SpO2 96 %.    Pt discharged from PACU based on brooks score >8  No anesthesia care post op

## 2022-10-11 NOTE — ANESTHESIA PREPROCEDURE EVALUATION
Anesthesia Evaluation     Patient summary reviewed and Nursing notes reviewed   NPO Solid Status: > 8 hours  NPO Liquid Status: > 8 hours           Airway   Mallampati: II  Dental - normal exam         Pulmonary - normal exam   (-) not a smoker  Cardiovascular   Exercise tolerance: good (4-7 METS)    ECG reviewed  Rhythm: regular  Rate: normal    (+) hypertension, past MI , CAD, CABG >6 Months, cardiac stents hyperlipidemia,   (-) pacemaker, valvular problems/murmurs      Neuro/Psych  (-) seizures, CVA  GI/Hepatic/Renal/Endo    (+)  GERD,    (-) diabetes    Musculoskeletal     Abdominal  - normal exam   Substance History      OB/GYN          Other                        Anesthesia Plan    ASA 3     general     intravenous induction     Anesthetic plan, risks, benefits, and alternatives have been provided, discussed and informed consent has been obtained with: patient.

## 2022-10-11 NOTE — OP NOTE
APPENDECTOMY LAPAROSCOPIC  Procedure Note    Logan Mayen  10/11/2022    Pre-op Diagnosis:   Chronic appendicitis    Post-op Diagnosis:     Post-Op Diagnosis Codes:     * Ruptured appendicitis [K35.32]       * Chronic appendicitis [K36]    Procedure/CPT® Codes:      Procedure(s):  APPENDECTOMY LAPAROSCOPIC    Surgeon(s):  Talat Moore MD        Anesthesia: General    Staff:   Specimens     ID Source Type Tests Collected By Collected At Frozen?    A Large Intestine, Appendix Tissue · TISSUE PATHOLOGY EXAM   Talat Moore MD 10/11/22 1224 No    Description: Appendix    This specimen was not marked as sent.          Estimated Blood Loss: 25 cc    Specimens:                ID Type Source Tests Collected by Time   A (Not marked as sent) : Appendix Tissue Large Intestine, Appendix TISSUE PATHOLOGY EXAM Taalt Moore MD 10/11/2022 1224         Drains:   [REMOVED] Urethral Catheter Double-lumen;Non-latex 16 Fr. (Removed)       Indications: Mr. Mayen is a 62 y.o. who is here for evaluation of appendix and gallbladder.  This is a very nice 62-year-old gentleman that I saw and evaluated in 2020, he had 3 or 4 episodes of abdominal pain right side had 3 or 4 instances of this and in fact had probable appendicitis in the past and had a periappendiceal abscess.  This was very extensive he did well with treatment he had resolution of the inflammation he had a follow-up CT scan that showed resolution he was given 1 week of IV antibiotics and then Levaquin he is done well he has had no problems since this time, also after this time he also had some upper abdominal pain and noted to have a reduced ejection fraction of 38%.  He continues with some very minimal abdominal pain he had cardiac event and had 4 vessel bypass in the past year and occasionally his mind wanders about what is going to happen with his appendix I advised him approximately 50% chance that this could become a problem for him in the  future as well as his gallbladder gave him problems in the past and could very well in the future.  We can continue to watch this or be proactive in treatment of this and he desires to be proactive in treatment of this previously inflamed appendix with an abscess present and also laparoscopic cholecystectomy and treatment of this intermittent midepigastric right upper quadrant pain with a marginal ejection fraction of 38% plus or minus pain with the injection of CCK.  He is aware of the procedure its risk and benefits and with full knowledge of this and apparent understanding he gives his informed consent for surgery plan for surgical intervention on 11 October after discussion he has had much less discomfort after he changed to become vegetarian, and does not desire to remove the gallbladder, but we will proceed with laparoscopic appendectomy.    Findings: Findings of chronic inflammation and adhesions in the right lower quadrant, laparoscopic appendectomy completed, the gallbladder had some adhesions around the area but no acute inflammation, appendectomy accomplished gallbladder was not removed.    Complications: No complications blood loss 25 cc    Procedure: Patient was placed in a supine position in the surgical suite and after a timeout had been completed, the bladder was drained, IV antibiotics completed the area was scrubbed prepped and draped with alcohol and Betadine.  Following this an Ioban was applied and a transverse skin incision was completed in the right midepigastric area.  Dissection was completed down to the fascia 2-0 Vicryl stay sutures were placed superior and inferior to the planned transverse incision and a transverse incision into the anterior rectus muscle was completed.  Using muscle-splitting technique through the rectus the peritoneum was grasped and entered under direct visualization a 12 mm trocar was inserted and advanced.  Insufflation with CO2 was completed after the open cutdown  had been accomplished and insufflation was completed to maintain the abdominal pressure between 10 and 14 mm of pressure.  At this point under direct visualization a 5 mm trocar was placed in the right mid abdomen as well as the suprapubic region.  With this accomplished with 2 fives and one 12 evaluation of the peritoneal cavity was accomplished.  The appendix was chronically scarred, somewhat bulbous at the tip, no acute inflammation there was significant adhesions around the area and these were taken down, and this dissection the insulation on the mini luan were noted to be unacceptable and we changed to a new mini sure, no injury to the patient occurred, with a new miniatures were able to dissect the lateral aspect of the cecum and create a window at the base of the appendix and also midpoint of the mesoappendix.  Was created at the base of the appendix and using the Endo ANY vascular load the mesoappendix was controlled.  With the mesial appendix and controlled the base of the appendix was then controlled using Endo ANY blue load.  With the appendix removed the staple line on the cecum as well as the mesial appendix was evaluated there was no bleeding noted and with a dry field noted the appendix was grasped and we made for assurance that all the clips from the endoscopic staple were fully removed there was no free floating clips that we can see the appendix was grasped placed in a Endo Catch and removed from the right midepigastric port.  Excess gas was then relieved the right midepigastric port was closed using 4 figure-of-eight sutures of 0 Vicryl the deep dermis was reapproximated using simple inverted interrupted sutures of 3-0 Vicryl and the skin was enclosed using a running subcutaneous suture of 4-0 Vicryl.  Following this Mastisol, Steri-Strips, 2 x 2 and Tegaderm applied the patient was then extubated and transferred to the recovery room in good condition.    Talat Moore MD     Date:  10/11/2022  Time: 12:56 CDT    EMR Dragon/Transcription disclaimer: Much of this encounter note is an electronic transcription/translation of spoken language to printed text. The electronic translation of spoken language may permit erroneous, or at times, nonsensical words or phrases to be inadvertently transcribed; although I have reviewed the note for such errors, some may still exist.

## 2022-10-11 NOTE — PLAN OF CARE
Goal Outcome Evaluation:  Plan of Care Reviewed With: patient        Progress: no change  Outcome Evaluation: ivf in progress. c/o minimal pain at rest. remains dtv. tolerating ice chips. abd with lap wds x2, gauze & tegaderm dry and intact. no acute distress noted. cont to monitor.

## 2022-10-11 NOTE — ANESTHESIA PROCEDURE NOTES
Airway  Airway not difficult    General Information and Staff    Patient location during procedure: OR  CRNA/CAA: Andrez England CRNA    Indications and Patient Condition  Indications for airway management: airway protection    Preoxygenated: yes  Mask difficulty assessment: 1 - vent by mask    Final Airway Details  Final airway type: endotracheal airway      Successful airway: ETT  Cuffed: yes   Successful intubation technique: direct laryngoscopy  Blade: Vanessa  Blade size: 3.5  ETT size (mm): 7.5  Cormack-Lehane Classification: grade I - full view of glottis  Placement verified by: chest auscultation and capnometry   Measured from: teeth  ETT/EBT  to teeth (cm): 22  Number of attempts at approach: 1  Assessment: lips, teeth, and gum same as pre-op and atraumatic intubation

## 2022-10-12 VITALS
BODY MASS INDEX: 28.89 KG/M2 | HEIGHT: 67 IN | WEIGHT: 184.08 LBS | DIASTOLIC BLOOD PRESSURE: 75 MMHG | OXYGEN SATURATION: 94 % | RESPIRATION RATE: 16 BRPM | SYSTOLIC BLOOD PRESSURE: 144 MMHG | HEART RATE: 74 BPM | TEMPERATURE: 98.4 F

## 2022-10-12 PROCEDURE — 25010000002 CEFOXITIN PER 1 G: Performed by: SPECIALIST

## 2022-10-12 PROCEDURE — G0378 HOSPITAL OBSERVATION PER HR: HCPCS

## 2022-10-12 RX ORDER — KETOROLAC TROMETHAMINE 10 MG/1
10 TABLET, FILM COATED ORAL EVERY 6 HOURS PRN
Qty: 15 TABLET | Refills: 1 | Status: ON HOLD | OUTPATIENT
Start: 2022-10-12 | End: 2023-02-13

## 2022-10-12 RX ORDER — HYDROCODONE BITARTRATE AND ACETAMINOPHEN 5; 325 MG/1; MG/1
1 TABLET ORAL EVERY 4 HOURS PRN
Qty: 15 TABLET | Refills: 0 | Status: ON HOLD | OUTPATIENT
Start: 2022-10-12 | End: 2023-02-13

## 2022-10-12 RX ORDER — ONDANSETRON HYDROCHLORIDE 8 MG/1
4 TABLET, FILM COATED ORAL EVERY 8 HOURS PRN
Qty: 10 TABLET | Refills: 1 | Status: SHIPPED | OUTPATIENT
Start: 2022-10-12

## 2022-10-12 RX ORDER — RAMIPRIL 10 MG/1
10 CAPSULE ORAL DAILY
COMMUNITY

## 2022-10-12 RX ADMIN — CEFOXITIN SODIUM 2 G: 2 POWDER, FOR SOLUTION INTRAVENOUS at 01:05

## 2022-10-12 RX ADMIN — SODIUM CHLORIDE, POTASSIUM CHLORIDE, SODIUM LACTATE AND CALCIUM CHLORIDE 100 ML/HR: 600; 310; 30; 20 INJECTION, SOLUTION INTRAVENOUS at 04:19

## 2022-10-12 RX ADMIN — METRONIDAZOLE 500 MG: 500 INJECTION, SOLUTION INTRAVENOUS at 04:19

## 2022-10-12 RX ADMIN — SUCRALFATE 1 G: 1 TABLET ORAL at 08:07

## 2022-10-12 RX ADMIN — SUCRALFATE 1 G: 1 TABLET ORAL at 11:42

## 2022-10-12 RX ADMIN — CEFOXITIN SODIUM 2 G: 2 POWDER, FOR SOLUTION INTRAVENOUS at 10:03

## 2022-10-12 NOTE — DISCHARGE SUMMARY
Date of Discharge:  10/12/2022    Discharge Diagnosis: Chronic appendicitis    Presenting Problem/History of Present Illness    Mr. Mayen is a 62 y.o. who is here for evaluation of appendix and gallbladder.  This is a very nice 62-year-old gentleman that I saw and evaluated in 2020, he had 3 or 4 episodes of abdominal pain right side had 3 or 4 instances of this and in fact had probable appendicitis in the past and had a periappendiceal abscess.  This was very extensive he did well with treatment he had resolution of the inflammation he had a follow-up CT scan that showed resolution he was given 1 week of IV antibiotics and then Levaquin he is done well he has had no problems since this time, also after this time he also had some upper abdominal pain and noted to have a reduced ejection fraction of 38%.  He continues with some very minimal abdominal pain he had cardiac event and had 4 vessel bypass in the past year and occasionally his mind wanders about what is going to happen with his appendix I advised him approximately 50% chance that this could become a problem for him in the future as well as his gallbladder gave him problems in the past and could very well in the future.  We can continue to watch this or be proactive in treatment of this and he desires to be proactive in treatment of this previously inflamed appendix with an abscess present and also laparoscopic cholecystectomy and treatment of this intermittent midepigastric right upper quadrant pain with a marginal ejection fraction of 38% plus or minus pain with the injection of CCK.  He is aware of the procedure its risk and benefits and with full knowledge of this and apparent understanding he gives his informed consent for surgery plan for surgical intervention on 11 October after discussion he has had much less discomfort after he changed to become vegetarian, and does not desire to remove the gallbladder, but we will proceed with  laparoscopic appendectomy.     Findings: Findings of chronic inflammation and adhesions in the right lower quadrant, laparoscopic appendectomy completed, the gallbladder had some adhesions around the area but no acute inflammation, appendectomy accomplished gallbladder was not removed.    He was subsequently admitted preop and taken to surgery initially we thought about removing the gallbladder as well as he had had a reduced ejection fraction but since changing to a vegetarian diet his symptoms have improved and he desires to keep the gallbladder we proceeded with laparoscopic exploration appendectomy he did well from the surgery he is 1 day out from surgery he is increasing his diet increasing his activity he has minimal incisional pain, currently he will be prepared, to be discharged to his home, to follow-up with me in 1 week.  Procedures Performed  Procedure(s):  APPENDECTOMY LAPAROSCOPIC       Consults:   Consults     No orders found for last 30 day(s).          Pertinent Test Results:   Lab Results (last 24 hours)     Procedure Component Value Units Date/Time    Tissue Pathology Exam [[id number]] Collected: 10/11/22 1224    Specimen: Tissue from Large Intestine, Appendix Updated: 10/12/22 9347            Condition on Discharge: Good condition      Discharge Disposition discharge to home      Discharge Medications     Discharge Medications      ASK your doctor about these medications      Instructions Start Date   aspirin 81 MG chewable tablet   81 mg, Oral, Daily      clopidogrel 75 MG tablet  Commonly known as: PLAVIX   75 mg, Oral, Daily      Garlic 100 MG tablet   400 mg, Oral, Daily      Krill Oil 350 MG capsule   2 tablets, Oral, Daily      metoprolol tartrate 25 MG tablet  Commonly known as: LOPRESSOR   25 mg, Oral, 2 Times Daily      ramipril 1.25 MG capsule  Commonly known as: ALTACE   1.25 mg, Oral, Daily      rosuvastatin 10 MG tablet  Commonly known as: CRESTOR   Nightly      sildenafil 20 MG  tablet  Commonly known as: REVATIO   40 mg, Oral, Daily PRN, Take 2 tablets daily as needed for erectile dysfunction.      Turmeric 500 MG capsule   4 tablets, Oral, Daily      vitamin D3 125 MCG (5000 UT) capsule capsule   5,000 Units, Oral, Daily             Discharge Diet: Regular diet    Activity at Discharge: No lifting or straining more than 15 pounds for the next month    Follow-up Appointments  Future Appointments   Date Time Provider Department Center   11/10/2022 10:30 AM Alessio Chung MD [facility]  [facility]         Test Results Pending at Discharge  Pending Labs     Order Current Status    Tissue Pathology Exam In process           Talat Moore MD  10/12/22  08:07 CDT    Time: Time spent at discharge 30 minutes     EMR Dragon/Transcription disclaimer: Much of this encounter note is an electronic transcription/translation of spoken language to printed text. The electronic translation of spoken language may permit erroneous, or at times, nonsensical words or phrases to be inadvertently transcribed; although I have reviewed the note for such errors, some may still exist.

## 2022-10-12 NOTE — NURSING NOTE
IV d/c'd. Discharge instructions reviewed with patient. Discharged via wheel chair to main entrance and departed via private vehicle with SO.

## 2022-10-12 NOTE — PLAN OF CARE
Goal Outcome Evaluation:              Outcome Evaluation: Patient no c/o of pain. Up ad david. Walked in guillen. IVF infusing. IV ABX as ordered. Dressing c/d/i. Safety maintained at this time.

## 2022-10-13 LAB
CYTO UR: NORMAL
LAB AP CASE REPORT: NORMAL
Lab: NORMAL
PATH REPORT.FINAL DX SPEC: NORMAL
PATH REPORT.GROSS SPEC: NORMAL

## 2022-10-19 ENCOUNTER — OFFICE VISIT (OUTPATIENT)
Dept: SURGERY | Facility: CLINIC | Age: 62
End: 2022-10-19

## 2022-10-19 VITALS
WEIGHT: 184.08 LBS | BODY MASS INDEX: 28.89 KG/M2 | SYSTOLIC BLOOD PRESSURE: 129 MMHG | HEIGHT: 67 IN | HEART RATE: 63 BPM | DIASTOLIC BLOOD PRESSURE: 84 MMHG

## 2022-10-19 DIAGNOSIS — K35.32 RUPTURED APPENDICITIS: Primary | ICD-10-CM

## 2022-10-19 DIAGNOSIS — K36 CHRONIC APPENDICITIS: ICD-10-CM

## 2022-10-19 DIAGNOSIS — K82.8 BILIARY DYSKINESIA: ICD-10-CM

## 2022-10-19 PROCEDURE — 99024 POSTOP FOLLOW-UP VISIT: CPT | Performed by: SPECIALIST

## 2022-10-19 NOTE — PATIENT INSTRUCTIONS
Good to see you today Mr. Mayen, I think your incisions look great I believe you are doing fine from your surgery there was a very low-grade mucinous tumor at the tip of the appendix that was fully removed we will set you up to have a colonoscopy in the next 1 to 3 months but otherwise I think you are doing good I will see you back in 1 month.

## 2022-10-19 NOTE — PROGRESS NOTES
Office Established Patient Note:     Referring Provider: Dr. FRED Medeiros  Chief complaint chronic appendicitis    Subjective .     History of present illness:  .Mr. Mayen is a 62 y.o. who is here for evaluation of appendix and gallbladder.  This is a very nice 62-year-old gentleman that I saw and evaluated in 2020, he had 3 or 4 episodes of abdominal pain right side had 3 or 4 instances of this and in fact had probable appendicitis in the past and had a periappendiceal abscess.  This was very extensive he did well with treatment he had resolution of the inflammation he had a follow-up CT scan that showed resolution he was given 1 week of IV antibiotics and then Levaquin he is done well he has had no problems since this time, also after this time he also had some upper abdominal pain and noted to have a reduced ejection fraction of 38%.  He continues with some very minimal abdominal pain he had cardiac event and had 4 vessel bypass in the past year and occasionally his mind wanders about what is going to happen with his appendix I advised him approximately 50% chance that this could become a problem for him in the future as well as his gallbladder gave him problems in the past and could very well in the future.  We can continue to watch this or be proactive in treatment of this and he desires to be proactive in treatment of this previously inflamed appendix with an abscess present and also laparoscopic cholecystectomy and treatment of this intermittent midepigastric right upper quadrant pain with a marginal ejection fraction of 38% plus or minus pain with the injection of CCK.  He is aware of the procedure its risk and benefits and with full knowledge of this and apparent understanding he gives his informed consent for surgery plan for surgical intervention on 11 October after discussion he has had much less discomfort after he changed to become vegetarian, and does not desire to remove the gallbladder, but  "we will proceed with laparoscopic appendectomy.     Findings: Findings of chronic inflammation and adhesions in the right lower quadrant, laparoscopic appendectomy completed, the gallbladder had some adhesions around the area but no acute inflammation, appendectomy accomplished gallbladder was not removed.    History  Note to Patients    This report may contain a detailed description of human tissue sent by a health care provider to the laboratory for pathologic evaluation. The content of this report is essential for diagnosis and may provide important critical findings. This information may be unfamiliar to patients to review without a medical professional present. It is advised that the patient review this report in the presence of a health care provider who can answer questions and explain the results.   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               - Negative for invasion.               - Viable resection margin; negative for neoplasm.               - Serosal adhesions.   Electronically signed by Sonal Garland MD on 10/13/2022 at 0958   Gross Description    1. Large Intestine, Appendix.   Received in a formalin filled container labeled with the patient's name, date of birth, and \"appendix\".  The specimen consists of an appendix with attached mesoappendix.  The appendix measures 5.1 cm in length by 0.8 " cm in diameter.  The serosal surface is tan to blue and dusky with attached adhesions and mesoappendix.  The external surface of the appendix and mesoappendix is inked blue, the stapled resection margin on the mesoappendix and appendix are inked black.  Sectioning reveals a patent fecal filled lumen.  The distal tip is dilated, smooth walled and filled with thin mucoid material.  The appendix resection margin and mesoappendix resection margin are shaved and submitted in block 1A.  The distal tip is longitudinally trisected and submitted in blocks 1B through 1D.  The remaining appendix is submitted from distal to proximal in blocks 1E and 1F.      Microscopic Description    Microscopic examination is performed and supports the final diagnosis.       He had laparoscopic exploration appendectomy completed at the time of surgery there were some chronic inflammation somewhat bullous finding at the tip of the appendix and this was removed, the pathology returned as a low-grade appendiceal mucinous neoplasm at the distal tip negative for any invasion, and serosal adhesions.    Past Medical History:   Diagnosis Date   • Coronary artery disease    • Hyperlipidemia    • Hypertension    • Myocardial infarction (HCC)    ,   Past Surgical History:   Procedure Laterality Date   • APPENDECTOMY N/A 10/11/2022    Procedure: APPENDECTOMY LAPAROSCOPIC;  Surgeon: Taalt Moore MD;  Location: [facility] OR;  Service: General;  Laterality: N/A;   • CARDIAC CATHETERIZATION     • CARDIAC CATHETERIZATION N/A 08/10/2021    Procedure: Left Heart Cath;  Surgeon: Alessio Chung MD;  Location: [facility] CATH INVASIVE LOCATION;  Service: Cardiovascular;  Laterality: N/A;   • CATARACT EXTRACTION W/ INTRAOCULAR LENS  IMPLANT, BILATERAL     • COLONOSCOPY     • CORONARY ARTERY BYPASS GRAFT N/A 08/10/2021    Procedure: CORONARY ARTERY BYPASS GRAFT X 4 WITH LEFT INTERNAL MAMMARY ARTERY, RIGHT LOWER EXTREMITY ENDOSCOPIC VEIN HARVEST, BILATERAL LOWER  EXTREMITY OPEN VEIN HARVEST, AND PERFUSION; TRANSESOPHAGEAL ECHOCARDIOGRAM;  Surgeon: Zana Hernandez MD;  Location:  [facility] OR;  Service: Cardiothoracic;  Laterality: N/A;   • CORONARY STENT PLACEMENT      x4   • HIP ARTHROPLASTY Left 2019   • PERIPHERAL ARTERIAL STENT GRAFT     ,   Family History   Problem Relation Age of Onset   • Lung disease Mother    • Heart attack Father    • Heart disease Father    • Lung disease Sister    • Rheum arthritis Sister    • Cancer Sister    • Rheum arthritis Sister    • Liver disease Sister    ,   Social History     Tobacco Use   • Smoking status: Never   • Smokeless tobacco: Never   Vaping Use   • Vaping Use: Never used   Substance Use Topics   • Alcohol use: Yes     Alcohol/week: 6.0 standard drinks     Types: 6 Cans of beer per week     Comment: 2-3 x a week   • Drug use: Yes     Types: Marijuana     Comment: sometimes   , (Not in a hospital admission)   and Allergies:  Patient has no known allergies.    Current Outpatient Medications:   •  aspirin 81 MG chewable tablet, Chew 81 mg Daily., Disp: , Rfl:   •  Cholecalciferol (VITAMIN D3) 125 MCG (5000 UT) capsule capsule, Take 5,000 Units by mouth Daily., Disp: , Rfl:   •  clopidogrel (PLAVIX) 75 MG tablet, Take 1 tablet by mouth Daily., Disp: 90 tablet, Rfl: 4  •  Garlic 100 MG tablet, Take 400 mg by mouth Daily., Disp: , Rfl:   •  HYDROcodone-acetaminophen (NORCO) 5-325 MG per tablet, Take 1 tablet by mouth Every 4 (Four) Hours As Needed for Mild Pain for up to 15 doses., Disp: 15 tablet, Rfl: 0  •  ketorolac (TORADOL) 10 MG tablet, Take 1 tablet by mouth Every 6 (Six) Hours As Needed for Moderate Pain for up to 15 doses., Disp: 15 tablet, Rfl: 1  •  Krill Oil 350 MG capsule, Take 2 tablets by mouth Daily., Disp: , Rfl:   •  methylcellulose (Citrucel) oral powder, Take 2 application by mouth Daily for 30 doses., Disp: 60 g, Rfl: 6  •  metoprolol tartrate (LOPRESSOR) 25 MG tablet, Take 1 tablet by mouth 2 (Two) Times a Day.  "(Patient taking differently: Take 2 tablets by mouth 2 (Two) Times a Day.), Disp: 180 tablet, Rfl: 4  •  ondansetron (Zofran) 8 MG tablet, Take 0.5 tablets by mouth Every 8 (Eight) Hours As Needed for Nausea or Vomiting for up to 10 doses., Disp: 10 tablet, Rfl: 1  •  ramipril (ALTACE) 10 MG capsule, Take 1 capsule by mouth Daily., Disp: , Rfl:   •  rosuvastatin (CRESTOR) 10 MG tablet, Take 1 tablet by mouth Every Night., Disp: , Rfl:   •  sildenafil (REVATIO) 20 MG tablet, Take 40 mg by mouth Daily As Needed. Take 2 tablets daily as needed for erectile dysfunction., Disp: , Rfl:   •  Turmeric 500 MG capsule, Take 4 tablets by mouth Daily., Disp: , Rfl:     Objective     Vital Signs   There were no vitals taken for this visit.     Physical Exam:  Respirations clear and equal    Cardiovascular exam regular rate and rhythm    Abdomen is soft, flat, nontender, nondistended, no sign of any infection.  No hernia.    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   - Negative for invasion.   - Viable resection margin; negative for neoplasm.   - Serosal adhesions.      GROSSDES  10/11/2022     1. Large Intestine, Appendix.   Received in a formalin filled container labeled with the patient's name, date of birth, and \"appendix\".  The specimen consists of an appendix with attached mesoappendix.  The appendix measures 5.1 cm in length by 0.8 cm in diameter.  The serosal surface is tan to blue and dusky with attached adhesions and mesoappendix.  The external surface of the appendix and mesoappendix is inked blue, the stapled resection margin on the mesoappendix and appendix are inked black.  Sectioning reveals a patent fecal filled lumen.  The distal tip is dilated, smooth walled and filled with thin mucoid material.  The appendix resection margin and mesoappendix resection margin are " shaved and submitted in block 1A.  The distal tip is longitudinally trisected and submitted in blocks 1B through 1D.  The remaining appendix is submitted from distal to proximal in blocks 1E and 1F.             Assessment & Plan   Presently doing well status post laparoscopic appendectomy he had a mucinous tumor at the tip of the appendix that needs to be further evaluated with a colonoscopy.  Colonoscopy to be set up I will see him back in 1 month for repeat evaluation sooner if questions or problems arise.    Discussed BMI elevation and need to move toward a reduced BMI for health concerns he appears to understand he is a non smoker and his meds were reviewed.      Talat Moore MD  10/19/22  07:55 CDT            Answers for HPI/ROS submitted by the patient on 10/19/2022  What is the primary reason for your visit?: Other  Please describe your symptoms.: Follow up after surgery  Have you had these symptoms before?: Yes  How long have you been having these symptoms?: 1-4 days

## 2022-11-02 DIAGNOSIS — R94.31 EKG ABNORMALITIES: ICD-10-CM

## 2022-11-02 DIAGNOSIS — I45.4 BUNDLE BRANCH BLOCK: Primary | ICD-10-CM

## 2022-11-10 ENCOUNTER — OFFICE VISIT (OUTPATIENT)
Dept: CARDIOLOGY | Facility: CLINIC | Age: 62
End: 2022-11-10

## 2022-11-10 VITALS
WEIGHT: 183 LBS | HEIGHT: 66 IN | OXYGEN SATURATION: 97 % | HEART RATE: 67 BPM | BODY MASS INDEX: 29.41 KG/M2 | SYSTOLIC BLOOD PRESSURE: 180 MMHG | DIASTOLIC BLOOD PRESSURE: 82 MMHG

## 2022-11-10 DIAGNOSIS — K35.32 RUPTURED APPENDICITIS: ICD-10-CM

## 2022-11-10 DIAGNOSIS — I10 PRIMARY HYPERTENSION: ICD-10-CM

## 2022-11-10 DIAGNOSIS — R06.83 SNORING: ICD-10-CM

## 2022-11-10 DIAGNOSIS — R94.31 ABNORMAL ECG: ICD-10-CM

## 2022-11-10 DIAGNOSIS — E78.5 HYPERLIPIDEMIA, UNSPECIFIED HYPERLIPIDEMIA TYPE: ICD-10-CM

## 2022-11-10 DIAGNOSIS — I25.2 HISTORY OF ST ELEVATION MYOCARDIAL INFARCTION (STEMI): ICD-10-CM

## 2022-11-10 DIAGNOSIS — G56.21 ULNAR NEUROPATHY OF RIGHT UPPER EXTREMITY: ICD-10-CM

## 2022-11-10 DIAGNOSIS — Z95.1 S/P CABG (CORONARY ARTERY BYPASS GRAFT): Primary | ICD-10-CM

## 2022-11-10 PROCEDURE — 99214 OFFICE O/P EST MOD 30 MIN: CPT | Performed by: INTERNAL MEDICINE

## 2022-11-10 RX ORDER — HYDROCHLOROTHIAZIDE 12.5 MG/1
12.5 CAPSULE, GELATIN COATED ORAL DAILY
Qty: 30 CAPSULE | Refills: 11 | Status: SHIPPED | OUTPATIENT
Start: 2022-11-10

## 2022-11-10 RX ORDER — CLONIDINE HYDROCHLORIDE 0.1 MG/1
0.1 TABLET ORAL 2 TIMES DAILY PRN
Qty: 60 TABLET | Refills: 3 | Status: SHIPPED | OUTPATIENT
Start: 2022-11-10

## 2022-11-10 RX ORDER — AMLODIPINE BESYLATE 5 MG/1
5 TABLET ORAL DAILY
Qty: 30 TABLET | Refills: 11 | Status: SHIPPED | OUTPATIENT
Start: 2022-11-10

## 2022-11-10 NOTE — PROGRESS NOTES
Logan Mayen  [id number]  1960  62 y.o.  male    Referring Provider: Logan Bryant MD    Reason for  Visit:   coronary artery disease   history of STEMI   coronary artery bypass grafting X 4     Subjective    BP elevated at home   Overall feeling well   No chest pain or shortness of breath     No palpitations  No significant pedal edema    Compliant with medications and dietary advice  Good effort tolerance    No presyncope or syncope  Compliant with medications    Tolerating current medications well with no untoward side effects   Compliant with prescribed medication regimen. Tries to adhere to cardiac diet.      No bleeding, excessive bruising, gait instability or fall risks    S/P uneventful appendectomy last month  and recovered well  Surgical wound healing very well. No evidence of excessive bruising, pain, redness, swelling, discharge or foul odor noted post op per patient     Has snoring with no daytime fatigue      History of present illness:  Logan Mayen is a 62 y.o. yo male with  coronary artery disease coronary artery bypass grafting prior STEMI  who presents today for   Chief Complaint   Patient presents with   • Coronary Artery Disease     1 yr f/u   .    History  Past Medical History:   Diagnosis Date   • Coronary artery disease    • Hyperlipidemia    • Hypertension    • Myocardial infarction (HCC)    ,   Past Surgical History:   Procedure Laterality Date   • APPENDECTOMY N/A 10/11/2022    Procedure: APPENDECTOMY LAPAROSCOPIC;  Surgeon: Talat Moore MD;  Location: [facility] OR;  Service: General;  Laterality: N/A;   • CARDIAC CATHETERIZATION     • CARDIAC CATHETERIZATION N/A 08/10/2021    Procedure: Left Heart Cath;  Surgeon: Alessio Chung MD;  Location:  [facility] CATH [facility]VASIVE [facility];  Service: Cardiovascular;  Laterality: N/A;   • CATARACT EXTRACTION W/ INTRAOCULAR LENS  IMPLANT, BILATERAL     • COLONOSCOPY     • CORONARY ARTERY BYPASS GRAFT N/A 08/10/2021    Procedure:  CORONARY ARTERY BYPASS GRAFT X 4 WITH LEFT INTERNAL MAMMARY ARTERY, RIGHT LOWER EXTREMITY ENDOSCOPIC VEIN HARVEST, BILATERAL LOWER EXTREMITY OPEN VEIN HARVEST, AND PERFUSION; TRANSESOPHAGEAL ECHOCARDIOGRAM;  Surgeon: Zana Hernandez MD;  Location: [facility] OR;  Service: Cardiothoracic;  Laterality: N/A;   • CORONARY ARTERY BYPASS GRAFT  08/10/2021   • CORONARY STENT PLACEMENT      x4   • HIP ARTHROPLASTY Left 2019   • PERIPHERAL ARTERIAL STENT GRAFT     ,   Family History   Problem Relation Age of Onset   • Lung disease Mother    • Heart attack Father    • Heart disease Father    • Lung disease Sister    • Rheum arthritis Sister    • Cancer Sister    • Rheum arthritis Sister    • Liver disease Sister    ,   Social History     Tobacco Use   • Smoking status: Never   • Smokeless tobacco: Never   Vaping Use   • Vaping Use: Never used   Substance Use Topics   • Alcohol use: Yes     Alcohol/week: 6.0 standard drinks     Types: 6 Cans of beer per week     Comment: 2-3 x a week   • Drug use: Yes     Types: Marijuana     Comment: sometimes   ,     Medications  Current Outpatient Medications   Medication Sig Dispense Refill   • aspirin 81 MG chewable tablet Chew 81 mg Daily.     • Cholecalciferol (VITAMIN D3) 125 MCG (5000 UT) capsule capsule Take 5,000 Units by mouth Daily.     • clopidogrel (PLAVIX) 75 MG tablet Take 1 tablet by mouth Daily. 90 tablet 4   • Garlic 100 MG tablet Take 400 mg by mouth Daily.     • Krill Oil 350 MG capsule Take 2 tablets by mouth Daily.     • metoprolol tartrate (LOPRESSOR) 25 MG tablet Take 1 tablet by mouth 2 (Two) Times a Day. (Patient taking differently: Take 2 tablets by mouth 2 (Two) Times a Day.) 180 tablet 4   • ramipril (ALTACE) 10 MG capsule Take 1 capsule by mouth Daily.     • rosuvastatin (CRESTOR) 10 MG tablet Take 1 tablet by mouth Every Night.     • sildenafil (REVATIO) 20 MG tablet Take 40 mg by mouth Daily As Needed. Take 2 tablets daily as needed for erectile dysfunction.    "  • Turmeric 500 MG capsule Take 4 tablets by mouth Daily.     • amLODIPine (NORVASC) 5 MG tablet Take 1 tablet by mouth Daily. 30 tablet 11   • cloNIDine (Catapres) 0.1 MG tablet Take 1 tablet by mouth 2 (Two) Times a Day As Needed for High Blood Pressure (> 150/90). 60 tablet 3   • hydroCHLOROthiazide (MICROZIDE) 12.5 MG capsule Take 1 capsule by mouth Daily. 30 capsule 11   • HYDROcodone-acetaminophen (NORCO) 5-325 MG per tablet Take 1 tablet by mouth Every 4 (Four) Hours As Needed for Mild Pain for up to 15 doses. 15 tablet 0   • ketorolac (TORADOL) 10 MG tablet Take 1 tablet by mouth Every 6 (Six) Hours As Needed for Moderate Pain for up to 15 doses. 15 tablet 1   • methylcellulose (Citrucel) oral powder Take 2 application by mouth Daily for 30 doses. 60 g 6   • ondansetron (Zofran) 8 MG tablet Take 0.5 tablets by mouth Every 8 (Eight) Hours As Needed for Nausea or Vomiting for up to 10 doses. 10 tablet 1     No current facility-administered medications for this visit.       Allergies:  Patient has no known allergies.    Review of Systems  Review of Systems   Constitutional: Negative.   HENT: Negative.    Eyes: Negative.    Cardiovascular: Negative for chest pain, claudication, cyanosis, dyspnea on exertion, irregular heartbeat, leg swelling, near-syncope, orthopnea, palpitations, paroxysmal nocturnal dyspnea and syncope.   Respiratory: Negative.    Endocrine: Negative.    Hematologic/Lymphatic: Negative.    Skin: Negative.    Musculoskeletal: Positive for arthritis. Negative for back pain and joint pain.   Gastrointestinal: Negative for anorexia.   Genitourinary: Negative.    Neurological: Negative.    Psychiatric/Behavioral: Negative.        Objective     Physical Exam:  /82 (BP Location: Left arm, Patient Position: Sitting, Cuff Size: Adult)   Pulse 67   Ht 167.6 cm (66\")   Wt 83 kg (183 lb)   SpO2 97%   BMI 29.54 kg/m²     Physical Exam  Constitutional:       Appearance: He is well-developed. "   HENT:      Head: Normocephalic.   Neck:      Vascular: Normal carotid pulses. No carotid bruit or JVD.      Trachea: No tracheal tenderness or tracheal deviation.   Cardiovascular:      Rate and Rhythm: Regular rhythm.      Pulses: Normal pulses.      Heart sounds: Normal heart sounds.   Pulmonary:      Effort: Pulmonary effort is normal.      Breath sounds: No stridor.   Abdominal:      General: There is no distension.      Palpations: Abdomen is soft.      Tenderness: There is no abdominal tenderness.   Musculoskeletal:      Cervical back: No edema.   Skin:     General: Skin is warm.   Neurological:      Mental Status: He is alert.      Cranial Nerves: No cranial nerve deficit.      Sensory: No sensory deficit.   Psychiatric:         Speech: Speech normal.         Behavior: Behavior normal.        Results Review:      Conclusion of cardiac catheterization    8/10/2021     Short left main coronary artery without any significant disease  Proximal left anterior descending coronary artery has a 70 to 80% stenosis  Proximal large diagonal branch has 70% stenosis  Proximal large obtuse marginal branch has 70% stenosis  Large dominant left circumflex coronary artery occluded distally treated with PTCA with establishment of WILLIAM-3 flow with complete resolution of chest pain  Nondominant moderate-sized right coronary artery with chronic total occlusion of the midsegment with vessel two-vessel collateral  Patent bilateral internal mammary arteries  Normal left ventricular end-diastolic pressure 12 mmHg  Left ventricular ejection fraction of approximately 50% with mild distal inferior and inferior lateral hypokinesis     ____________________________________________________________________________________________________________________________________________     Plan after cardiac catheterization        Angiomax bolus and infusion given infusion will be continued  Integrilin bolus infusion given which will be  "continued  CT surgery consultation  Discussed with Dr. Hernandez and talk to Viry SIDDIQI  Keep n.p.o. for now  Further recommendations pending CT surgery consultation     Logan Mayen  Echo Complete w/ Doppler, Color Flow, Contrast and Strain  Order# [id number]  Reading physician: Alessio Chung MD Ordering physician: Viry Garcia APRN Study date: 8/10/21   Patient Information    Patient Name   Logan Mayen MRN   [id number] Legal Sex   Male  (Age)   1960 (61 y.o.)   PACS Images     Show images for Adult Transthoracic Echo Complete W/ Cont if Necessary Per Protocol    Sedation Narrator Report    Sedation Narrator Report      Interpretation Summary    · Left ventricular ejection fraction appears to be 51 - 55%. Left ventricular systolic function is low normal.  · Left ventricular diastolic function was normal.  · Abnormal global longitudinal LV strain (GLS) = -13%  · Estimated right ventricular systolic pressure from tricuspid regurgitation is normal (<35 mmHg).        Patient Hx Of Height, Weight, and Vitals    Height Weight BSA (Calculated - sq m) BMI (Calculated) Retired BMI (kg/m2) Pulse BP   170.2 cm (67.01\") 86.1 kg (189 lb 13.1 oz) 1.93 sq meters 29.7 28.24 75 102/62     [employer] PACS Images     Show images for Adult Transthoracic Echo Complete W/ Cont if Necessary Per Protocol    Clinical Indication    Chest Pain     Interpretation Summary    • Left ventricular ejection fraction appears to be 51 - 55%. Left ventricular systolic function is low normal.  • Left ventricular diastolic function was normal.  • Abnormal global longitudinal LV strain (GLS) = -13%  • Estimated right ventricular systolic pressure from tricuspid regurgitation is normal (<35 mmHg).         Results for orders placed during the hospital encounter of 08/10/21    Intra-Op CHARITY    Interpretation Summary  · Estimated left ventricular EF = 50%  · Postop no free air      "   ____________________________________________________________________________________________________________________________________________  Health maintenance and recommendations    Low salt/ HTN/ Heart healthy carbohydrate restricted cardiac diet   The patient is advised to reduce or avoid caffeine or other cardiac stimulants.   Minimize or avoid  NSAID-type medications      Monitor for any signs of bleeding including red or dark stools. Fall precautions.   Advised staying uptodate with immunizations per established standard guidelines.    Offered to give patient  a copy of my notes     Questions were encouraged, asked and answered to the patient's  understanding and satisfaction. Questions if any regarding current medications and side effects, need for refills and importance of compliance to medications stressed.    Reviewed available prior notes, consults, prior visits, laboratory findings, radiology and cardiology relevant reports. Updated chart as applicable. I have reviewed the patient's medical history in detail and updated the computerized patient record as relevant.      Updated patient regarding any new or relevant abnormalities on review of records or any new findings on physical exam. Mentioned to patient about purpose of visit and desirable health short and long term goals and objectives.    Primary to monitor CBC CMP Lipid panel and TSH as applicable    ___________________________________________________________________________________________________________________________________________   Procedures    Assessment & Plan   Diagnoses and all orders for this visit:    1. S/P CABG (coronary artery bypass graft) x 4 Dr Hernandez 08/11/21 (Primary)    2. Primary hypertension    3. History of ST elevation myocardial infarction (STEMI)    4. Hyperlipidemia, unspecified hyperlipidemia type    5. Ruptured appendicitis    6. Ulnar neuropathy of right upper extremity    7. Snoring  -     Overnight Sleep  Oximetry Study; Future    8. Abnormal ECG  -     Adult Transthoracic Echo Complete w/ Color, Spectral and Contrast if necessary per protocol; Future    Other orders  -     hydroCHLOROthiazide (MICROZIDE) 12.5 MG capsule; Take 1 capsule by mouth Daily.  Dispense: 30 capsule; Refill: 11  -     amLODIPine (NORVASC) 5 MG tablet; Take 1 tablet by mouth Daily.  Dispense: 30 tablet; Refill: 11  -     cloNIDine (Catapres) 0.1 MG tablet; Take 1 tablet by mouth 2 (Two) Times a Day As Needed for High Blood Pressure (> 150/90).  Dispense: 60 tablet; Refill: 3          Plan      Patient expressed understanding  Encouraged and answered all questions   Discussed with the patient and all questioned fully answered. He will call me if any problems arise.   Discussed results of prior testing with patient : echo   Orders Placed This Encounter   Procedures   • Overnight Sleep Oximetry Study     Standing Status:   Future     Standing Expiration Date:   11/10/2023   • Adult Transthoracic Echo Complete w/ Color, Spectral and Contrast if necessary per protocol     Myocardial strain to be performed during echocardiogram as long as technically feasible     Standing Status:   Future     Standing Expiration Date:   11/10/2023     Order Specific Question:   Reason for exam?     Answer:   Dyspnea     Order Specific Question:   Release to patient     Answer:   Routine Release           Needs better BP control    Requested Prescriptions     Signed Prescriptions Disp Refills   • hydroCHLOROthiazide (MICROZIDE) 12.5 MG capsule 30 capsule 11     Sig: Take 1 capsule by mouth Daily.   • amLODIPine (NORVASC) 5 MG tablet 30 tablet 11     Sig: Take 1 tablet by mouth Daily.   • cloNIDine (Catapres) 0.1 MG tablet 60 tablet 3     Sig: Take 1 tablet by mouth 2 (Two) Times a Day As Needed for High Blood Pressure (> 150/90).        Check BP and heart rates twice daily initially till blood pressures and heart rates under good control and then at least 3x / week,    If blood pressures continue to be well-controlled then can check week a month  at home and bring a recording for review next visit  If BP >130/85 or < 100/60 persistently over 3 reading 30 mins apart or if heart rates persistently above 100 bpm or less than 55 bpm call sooner for evaluation and advise       Keep LDL below 70 mg/dl. Monitor liver and renal functions.   Monitor CBC, CMP, TSH (as indicated) and Lipid Panel by primary     I support the patient's decision to take the Covid -19 vaccine   Had required complete course   No major issues   Now fully immunized    Recommend booster       Monitor for any signs of bleeding including red or dark stools as well as easy bruisabilty. Fall precautions.   No  NTG as on Sildenafil weekly use         Return in about 6 weeks (around 12/22/2022).

## 2022-11-17 ENCOUNTER — APPOINTMENT (OUTPATIENT)
Dept: CARDIOLOGY | Facility: HOSPITAL | Age: 62
End: 2022-11-17

## 2022-11-18 ENCOUNTER — OFFICE VISIT (OUTPATIENT)
Dept: SURGERY | Facility: CLINIC | Age: 62
End: 2022-11-18

## 2022-11-18 VITALS
HEART RATE: 65 BPM | DIASTOLIC BLOOD PRESSURE: 70 MMHG | TEMPERATURE: 97.5 F | HEIGHT: 66 IN | SYSTOLIC BLOOD PRESSURE: 140 MMHG | WEIGHT: 182.98 LBS | BODY MASS INDEX: 29.41 KG/M2

## 2022-11-18 DIAGNOSIS — C56.9 MUCINOUS TUMOR, OF LOW MALIGNANT POTENTIAL: ICD-10-CM

## 2022-11-18 DIAGNOSIS — K35.32 RUPTURED APPENDICITIS: Primary | ICD-10-CM

## 2022-11-18 DIAGNOSIS — K36 CHRONIC APPENDICITIS: ICD-10-CM

## 2022-11-18 DIAGNOSIS — K82.8 BILIARY DYSKINESIA: ICD-10-CM

## 2022-11-18 PROCEDURE — 99024 POSTOP FOLLOW-UP VISIT: CPT | Performed by: SPECIALIST

## 2022-11-18 NOTE — PATIENT INSTRUCTIONS
Good to see you today Mr. Mayen , I am glad things are doing well just for assurance that no problems exist in your colon with this appendiceal mucinous neoplasm we need to get a colonoscopy to make sure everything in the colon looks good and there was abnormality on your previous EKG that it changed and that was the reason for the cardiac evaluation.  There is reportedly a bundle branch block present which further cardiology evaluation needs to occur.  Please call us if they have not set up a colonoscopy for you in the next 2 weeks and we will further make inquiries.  Thanks for letting me take care of your problem.

## 2022-11-18 NOTE — PROGRESS NOTES
Office Established Patient Note:     Referring Provider: Dr. FRED Bryant  Chief complaint follow-up interval appendectomy.    Subjective .     History of present illness:  Mr. Mayen is a 62 y.o. who is here for evaluation of appendix and gallbladder.  This is a very nice 62-year-old gentleman that I saw and evaluated in 2020, he had 3 or 4 episodes of abdominal pain right side had 3 or 4 instances of this and in fact had probable appendicitis in the past and had a periappendiceal abscess.  This was very extensive he did well with treatment he had resolution of the inflammation he had a follow-up CT scan that showed resolution he was given 1 week of IV antibiotics and then Levaquin he is done well he has had no problems since this time, also after this time he also had some upper abdominal pain and noted to have a reduced ejection fraction of 38%.  He continues with some very minimal abdominal pain he had cardiac event and had 4 vessel bypass in the past year and occasionally his mind wanders about what is going to happen with his appendix I advised him approximately 50% chance that this could become a problem for him in the future as well as his gallbladder gave him problems in the past and could very well in the future.  We can continue to watch this or be proactive in treatment of this and he desires to be proactive in treatment of this previously inflamed appendix with an abscess present and also laparoscopic cholecystectomy and treatment of this intermittent midepigastric right upper quadrant pain with a marginal ejection fraction of 38% plus or minus pain with the injection of CCK.  He is aware of the procedure its risk and benefits and with full knowledge of this and apparent understanding he gives his informed consent for surgery plan for surgical intervention on 11 October after discussion he has had much less discomfort after he changed to become vegetarian, and does not desire to remove the  gallbladder, but we will proceed with laparoscopic appendectomy.     Findings: Findings of chronic inflammation and adhesions in the right lower quadrant, laparoscopic appendectomy completed, the gallbladder had some adhesions around the area but no acute inflammation, appendectomy accomplished gallbladder was not removed.     He is done well from his laparoscopic appendectomy, on the pathology there was a low-grade mucinous neoplasm at the tip, also in his preoperative work-up there was some bundle branch block that was new, and we referred him for cardiology evaluation and he needs to have a colonoscopy.  He is self-insured and does not want to do the cardiac evaluation but is ready for the colonoscopy.  History  Past Medical History:   Diagnosis Date   • Coronary artery disease    • Hyperlipidemia    • Hypertension    • Myocardial infarction (HCC)    ,   Past Surgical History:   Procedure Laterality Date   • APPENDECTOMY N/A 10/11/2022    Procedure: APPENDECTOMY LAPAROSCOPIC;  Surgeon: Talat Moore MD;  Location: [facility] OR;  Service: General;  Laterality: N/A;   • CARDIAC CATHETERIZATION     • CARDIAC CATHETERIZATION N/A 08/10/2021    Procedure: Left Heart Cath;  Surgeon: Alessio Chung MD;  Location:  [facility] CATH INVASIVE LOCATION;  Service: Cardiovascular;  Laterality: N/A;   • CATARACT EXTRACTION W/ INTRAOCULAR LENS  IMPLANT, BILATERAL     • COLONOSCOPY     • CORONARY ARTERY BYPASS GRAFT N/A 08/10/2021    Procedure: CORONARY ARTERY BYPASS GRAFT X 4 WITH LEFT INTERNAL MAMMARY ARTERY, RIGHT LOWER EXTREMITY ENDOSCOPIC VEIN HARVEST, BILATERAL LOWER EXTREMITY OPEN VEIN HARVEST, AND PERFUSION; TRANSESOPHAGEAL ECHOCARDIOGRAM;  Surgeon: Zana Hernandez MD;  Location: [facility] OR;  Service: Cardiothoracic;  Laterality: N/A;   • CORONARY ARTERY BYPASS GRAFT  08/10/2021   • CORONARY STENT PLACEMENT      x4   • HIP ARTHROPLASTY Left 2019   • PERIPHERAL ARTERIAL STENT GRAFT     ,   Family History   Problem Relation Age  of Onset   • Lung disease Mother    • Heart attack Father    • Heart disease Father    • Lung disease Sister    • Rheum arthritis Sister    • Cancer Sister    • Rheum arthritis Sister    • Liver disease Sister    ,   Social History     Tobacco Use   • Smoking status: Never   • Smokeless tobacco: Never   Vaping Use   • Vaping Use: Never used   Substance Use Topics   • Alcohol use: Yes     Alcohol/week: 6.0 standard drinks     Types: 6 Cans of beer per week     Comment: 2-3 x a week   • Drug use: Yes     Types: Marijuana     Comment: sometimes   , (Not in a hospital admission)   and Allergies:  Patient has no known allergies.    Current Outpatient Medications:   •  amLODIPine (NORVASC) 5 MG tablet, Take 1 tablet by mouth Daily., Disp: 30 tablet, Rfl: 11  •  aspirin 81 MG chewable tablet, Chew 81 mg Daily., Disp: , Rfl:   •  Cholecalciferol (VITAMIN D3) 125 MCG (5000 UT) capsule capsule, Take 5,000 Units by mouth Daily., Disp: , Rfl:   •  cloNIDine (Catapres) 0.1 MG tablet, Take 1 tablet by mouth 2 (Two) Times a Day As Needed for High Blood Pressure (> 150/90)., Disp: 60 tablet, Rfl: 3  •  clopidogrel (PLAVIX) 75 MG tablet, Take 1 tablet by mouth Daily., Disp: 90 tablet, Rfl: 4  •  Garlic 100 MG tablet, Take 400 mg by mouth Daily., Disp: , Rfl:   •  hydroCHLOROthiazide (MICROZIDE) 12.5 MG capsule, Take 1 capsule by mouth Daily., Disp: 30 capsule, Rfl: 11  •  HYDROcodone-acetaminophen (NORCO) 5-325 MG per tablet, Take 1 tablet by mouth Every 4 (Four) Hours As Needed for Mild Pain for up to 15 doses., Disp: 15 tablet, Rfl: 0  •  ketorolac (TORADOL) 10 MG tablet, Take 1 tablet by mouth Every 6 (Six) Hours As Needed for Moderate Pain for up to 15 doses., Disp: 15 tablet, Rfl: 1  •  Krill Oil 350 MG capsule, Take 2 tablets by mouth Daily., Disp: , Rfl:   •  metoprolol tartrate (LOPRESSOR) 25 MG tablet, Take 1 tablet by mouth 2 (Two) Times a Day. (Patient taking differently: Take 2 tablets by mouth 2 (Two) Times a Day.),  "Disp: 180 tablet, Rfl: 4  •  ondansetron (Zofran) 8 MG tablet, Take 0.5 tablets by mouth Every 8 (Eight) Hours As Needed for Nausea or Vomiting for up to 10 doses., Disp: 10 tablet, Rfl: 1  •  ramipril (ALTACE) 10 MG capsule, Take 1 capsule by mouth Daily., Disp: , Rfl:   •  rosuvastatin (CRESTOR) 10 MG tablet, Take 1 tablet by mouth Every Night., Disp: , Rfl:   •  sildenafil (REVATIO) 20 MG tablet, Take 40 mg by mouth Daily As Needed. Take 2 tablets daily as needed for erectile dysfunction., Disp: , Rfl:   •  Turmeric 500 MG capsule, Take 4 tablets by mouth Daily., Disp: , Rfl:     Objective     Vital Signs   There were no vitals taken for this visit.     Physical Exam:  Respirations clear and equal    Cardiovascular exam regular rate and rhythm    Abdomen is soft, flat, nontender, nondistended, nicely healing wound, right mid epigastrium, no sign of any infection no hernia.        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   - Negative for invasion.   - Viable resection margin; negative for neoplasm.   - Serosal adhesions.      GROSSDES  10/11/2022     1. Large Intestine, Appendix.   Received in a formalin filled container labeled with the patient's name, date of birth, and \"appendix\".  The specimen consists of an appendix with attached mesoappendix.  The appendix measures 5.1 cm in length by 0.8 cm in diameter.  The serosal surface is tan to blue and dusky with attached adhesions and mesoappendix.  The external surface of the appendix and mesoappendix is inked blue, the stapled resection margin on the mesoappendix and appendix are inked black.  Sectioning reveals a patent fecal filled lumen.  The distal tip is dilated, smooth walled and filled with thin mucoid material.  The appendix resection margin and mesoappendix resection margin are shaved and submitted in block " 1A.  The distal tip is longitudinally trisected and submitted in blocks 1B through 1D.  The remaining appendix is submitted from distal to proximal in blocks 1E and 1F.             Assessment & Plan       Currently he will follow-up with his cardiologist he realizes that he has a bundle branch block noted on his EKG that was different from previous studies, he had cardiac surgery 16 months ago, he is also aware that to fully screen the colon given his mucinous neoplasm he will have a full colonoscopy completed he does not remember who completed it last time but we will set him up for colonoscopy.  I will see him back if questions or problems arise.    Discussed BMI elevation and need to move toward a reduced BMI for health concerns he appears to understand he is a non smoker and his meds were reviewed.      Talat Moore MD  11/18/22  09:47 CST

## 2022-12-22 PROBLEM — D37.3 LOW GRADE MUCINOUS NEOPLASM OF APPENDIX: Status: ACTIVE | Noted: 2022-12-22

## 2023-01-23 ENCOUNTER — OFFICE VISIT (OUTPATIENT)
Dept: GASTROENTEROLOGY | Facility: CLINIC | Age: 63
End: 2023-01-23
Payer: COMMERCIAL

## 2023-01-23 VITALS
SYSTOLIC BLOOD PRESSURE: 144 MMHG | BODY MASS INDEX: 29.03 KG/M2 | DIASTOLIC BLOOD PRESSURE: 80 MMHG | TEMPERATURE: 98 F | OXYGEN SATURATION: 97 % | WEIGHT: 185 LBS | HEIGHT: 67 IN | HEART RATE: 98 BPM

## 2023-01-23 DIAGNOSIS — Z12.11 ENCOUNTER FOR SCREENING FOR MALIGNANT NEOPLASM OF COLON: Primary | ICD-10-CM

## 2023-01-23 DIAGNOSIS — D37.3 LOW GRADE MUCINOUS NEOPLASM OF APPENDIX: ICD-10-CM

## 2023-01-23 DIAGNOSIS — Z79.01 ANTICOAGULATED: ICD-10-CM

## 2023-01-23 PROCEDURE — 99213 OFFICE O/P EST LOW 20 MIN: CPT | Performed by: NURSE PRACTITIONER

## 2023-01-23 RX ORDER — SODIUM, POTASSIUM,MAG SULFATES 17.5-3.13G
177 SOLUTION, RECONSTITUTED, ORAL ORAL TAKE AS DIRECTED
Qty: 177 ML | Refills: 0 | Status: CANCELLED | OUTPATIENT
Start: 2023-01-23

## 2023-01-23 NOTE — PROGRESS NOTES
Chief Complaint   Patient presents with   • Colonoscopy     10 year colon rectal polyp       PCP: Logan Bryant MD  REFER: Talat Moore MD    Subjective     HPI      Oct 2022 Logan Mayen evaluated by Dr Moore for consideration of appendectomy/cholecystectomy.  Logan Mayen elected to not proceed with cholecystectomy after dietary changes but did proceed with appendectomy.  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          Logan Mayen has been referred to office for colonoscopy screening.  Logan Mayen denies abdominal pain, No bright red blood per rectum, no melena, no changes in bowels.  No weight loss.  No family history of colon cancer or colon polyps.  colonoscopy 10 years ago with rectal polyp.       Tissue Pathology Exam (10/11/2022 12:24)-from appendectomy     Lab Results - Last 18 Months   Lab Units 10/05/22  1431 08/13/21  0540 08/12/21  0354 08/11/21  0316 08/10/21  2354 08/10/21  1951   HEMOGLOBIN g/dL 15.8 8.8* 7.1* 8.4* 8.8* 9.2*   HEMATOCRIT % 45.2 25.8* 20.7* 24.1* 25.2* 26.1*   MCV fL 92.2 94.5 95.4 93.8 93.7 93.2   WBC 10*3/mm3 5.64 7.74 6.83 6.95 8.32 7.02   PLATELETS 10*3/mm3 204 131* 107* 120* 135* 106*       Lab Results - Last 18 Months   Lab Units 10/05/22  1431 08/13/21  0540 08/12/21  0354 08/11/21  0316 08/10/21  2354 08/10/21  1951 08/10/21  1355 08/10/21  0910   GLUCOSE mg/dL 98 119* 142* 143* 179* 119*  --  114*   SODIUM mmol/L 139 138 135* 143 142 144  --  137   SODIUM, ARTERIAL   --   --   --   --   --   --    < >  --    SODIUM, VENOUS   --   --   --   --   --   --    < >  --    POTASSIUM mmol/L 4.1 4.0 3.9 4.4 5.0 4.0  --  4.1   POTASSIUM, VENOUS   --   --   --   --   --   --    < >  --    CREATININE mg/dL 0.74* 0.73* 0.68* 0.81 0.90 0.74*  --  0.77   BUN mg/dL 10 11 10 12 13 12  --  15   BUN / CREAT RATIO  13.5 15.1 14.7 14.8 14.4 16.2  --  19.5   ALK PHOS U/L 73  --   --   --   --   --   --  99   ALT (SGPT)  U/L 23  --   --   --   --   --   --  23   AST (SGOT) U/L 23  --   --   --   --   --   --  68*   BILIRUBIN mg/dL 0.6  --   --   --   --   --   --  0.4   ALBUMIN g/dL 4.30  --   --  3.80 4.00 3.60  --  4.40    < > = values in this interval not displayed.       Lab Results - Last 18 Months   Lab Units 08/13/21  0540 08/12/21  0354 08/11/21  0316 08/10/21  2354 08/10/21  1951 08/10/21  0910   EGFR IF NONAFRICN AM mL/min/1.73 110 119 98 86 108 103       No results for input(s): IRON, TIBC, LABIRON, FERRITIN, K2POONA, TSH, FOLATE in the last 11237 hours.    Invalid input(s): VITB12      Past Medical History:   Diagnosis Date   • Coronary artery disease    • Hyperlipidemia    • Hypertension    • Myocardial infarction (HCC)        Past Surgical History:   Procedure Laterality Date   • APPENDECTOMY N/A 10/11/2022    Procedure: APPENDECTOMY LAPAROSCOPIC;  Surgeon: Talat Moore MD;  Location: [facility] OR;  Service: General;  Laterality: N/A;   • CARDIAC CATHETERIZATION     • CARDIAC CATHETERIZATION N/A 08/10/2021    Procedure: Left Heart Cath;  Surgeon: Alessio Chung MD;  Location: [facility] CATH INVASIVE LOCATION;  Service: Cardiovascular;  Laterality: N/A;   • CATARACT EXTRACTION W/ INTRAOCULAR LENS  IMPLANT, BILATERAL     • COLONOSCOPY  01/05/2016    rectal polyp   • CORONARY ARTERY BYPASS GRAFT N/A 08/10/2021    Procedure: CORONARY ARTERY BYPASS GRAFT X 4 WITH LEFT INTERNAL MAMMARY ARTERY, RIGHT LOWER EXTREMITY ENDOSCOPIC VEIN HARVEST, BILATERAL LOWER EXTREMITY OPEN VEIN HARVEST, AND PERFUSION; TRANSESOPHAGEAL ECHOCARDIOGRAM;  Surgeon: Zana Hernandez MD;  Location: [facility] OR;  Service: Cardiothoracic;  Laterality: N/A;   • CORONARY ARTERY BYPASS GRAFT  08/10/2021   • CORONARY STENT PLACEMENT      x4   • ENDOSCOPY  09/04/2014    questionable fair's  gastritis  esophagitis   • HIP ARTHROPLASTY Left 2019   • PERIPHERAL ARTERIAL STENT GRAFT         Outpatient Medications Marked as Taking for the 1/23/23 encounter  (Office Visit) with Roland Mark APRN   Medication Sig Dispense Refill   • amLODIPine (NORVASC) 5 MG tablet Take 1 tablet by mouth Daily. 30 tablet 11   • aspirin 81 MG chewable tablet Chew 81 mg Daily.     • Cholecalciferol (VITAMIN D3) 125 MCG (5000 UT) capsule capsule Take 5,000 Units by mouth Daily.     • cloNIDine (Catapres) 0.1 MG tablet Take 1 tablet by mouth 2 (Two) Times a Day As Needed for High Blood Pressure (> 150/90). 60 tablet 3   • clopidogrel (PLAVIX) 75 MG tablet Take 1 tablet by mouth Daily. 90 tablet 4   • Garlic 100 MG tablet Take 400 mg by mouth Daily.     • hydroCHLOROthiazide (MICROZIDE) 12.5 MG capsule Take 1 capsule by mouth Daily. 30 capsule 11   • Krill Oil 350 MG capsule Take 2 tablets by mouth Daily.     • metoprolol tartrate (LOPRESSOR) 25 MG tablet Take 1 tablet by mouth 2 (Two) Times a Day. (Patient taking differently: Take 50 mg by mouth 2 (Two) Times a Day.) 180 tablet 4   • ramipril (ALTACE) 10 MG capsule Take 1 capsule by mouth Daily.     • rosuvastatin (CRESTOR) 10 MG tablet Take 1 tablet by mouth Every Night.     • sildenafil (REVATIO) 20 MG tablet Take 40 mg by mouth Daily As Needed. Take 2 tablets daily as needed for erectile dysfunction.     • Turmeric 500 MG capsule Take 4 tablets by mouth Daily.         No Known Allergies    Social History     Socioeconomic History   • Marital status:    Tobacco Use   • Smoking status: Never   • Smokeless tobacco: Never   Vaping Use   • Vaping Use: Never used   Substance and Sexual Activity   • Alcohol use: Yes     Alcohol/week: 6.0 standard drinks     Types: 6 Cans of beer per week     Comment: 2-3 x a week   • Drug use: Yes     Types: Marijuana     Comment: sometimes   • Sexual activity: Yes     Partners: Female       Review of Systems   Constitutional: Negative for unexpected weight change.   Respiratory: Negative for shortness of breath.    Cardiovascular: Negative for chest pain.   Gastrointestinal: Negative for  "abdominal pain and anal bleeding.       Objective     Vitals:    01/23/23 1035   BP: 144/80   Pulse: 98   Temp: 98 °F (36.7 °C)   SpO2: 97%   Weight: 83.9 kg (185 lb)   Height: 170.2 cm (67\")     Body mass index is 28.98 kg/m².    Physical Exam  Constitutional:       Appearance: Normal appearance. He is well-developed.   Eyes:      General: No scleral icterus.  Cardiovascular:      Rate and Rhythm: Regular rhythm.      Heart sounds: Normal heart sounds. No murmur heard.  Pulmonary:      Effort: Pulmonary effort is normal. No accessory muscle usage.      Breath sounds: Normal breath sounds.   Abdominal:      General: Bowel sounds are normal. There is no distension.      Palpations: Abdomen is soft. There is no mass.      Tenderness: There is no abdominal tenderness. There is no guarding or rebound.   Skin:     General: Skin is warm and dry.      Coloration: Skin is not jaundiced.   Neurological:      Mental Status: He is alert.   Psychiatric:         Behavior: Behavior is cooperative.         Imaging Results (Most Recent)     None          Body mass index is 28.98 kg/m².    Assessment & Plan     Diagnoses and all orders for this visit:    1. 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 Anticoagulated    Other orders  -     polyethylene glycol (GoLYTELY) 236 g solution; Take 3,785 mL by mouth 1 (One) Time for 1 dose. Take as directed  Dispense: 3350 mL; Refill: 0         COLONOSCOPY WITH ANESTHESIA (N/A)    Patient is to continue all blood pressure and cardiac medications prior to procedure and has been advised to take medications morning of " procedure   Pt verbalized understanding        Advised pt to stop use of NSAIDs, Fish Oil, and MV 5 days prior to procedure, per Dr Funes protocol.  Tylenol based products are ok to take.  Pt verbalized understanding.      All risks, benefits, alternatives, and indications of colonoscopy procedure have been discussed with the patient. Risks to include perforation of the colon requiring possible surgery or colostomy, risk of bleeding from biopsies or removal of colon tissue, possibility of missing a colon polyp or cancer, or adverse drug reaction.  Benefits to include the diagnosis and management of disease of the colon and rectum. Alternatives to include barium enema, radiographic evaluation, lab testing or no intervention. Pt verbalizes understanding and agrees to proceed with procedure.    I have explained having an adequate and complete prep is associated with success of colonoscopy.   I have explained to Logan Mayen that not having an adequate bowel prep can lead to decreased rate of  adenoma detection, longer procedure times, and higher chance the physician will not be able to successfully complete full colonoscopy (able to intubate cecum).   I have discussed bowel prep options of clenpiq vs Miralax prep vs suprep v sutab.  Logan Mayen has elected to proceed with mariela.   I have also discussed that there are a variety of prep options however prep that is prescribed is influenced on patient health history, how well bowels move on regular basis, and individualized insurance plan.    Patient is to hold their anticoagulation medication per the direction of their prescribing provider,  Logan Bryant MD . This is to prevent any risk or complication from bleeding intra and post procedure. If they develop bleeding post procedure they are to go the emergency department for further evaluation and treatment immediately.  We will obtain clearance from prescribing provider requesting Plavix will  need to be held x 5 days prior to procedure.         Roland Mark, APRN  01/23/23          There are no Patient Instructions on file for this visit.

## 2023-02-13 ENCOUNTER — HOSPITAL ENCOUNTER (OUTPATIENT)
Facility: HOSPITAL | Age: 63
Setting detail: HOSPITAL OUTPATIENT SURGERY
Discharge: HOME | End: 2023-02-13
Attending: INTERNAL MEDICINE | Admitting: INTERNAL MEDICINE
Payer: COMMERCIAL

## 2023-02-13 ENCOUNTER — ANESTHESIA EVENT (OUTPATIENT)
Dept: GASTROENTEROLOGY | Facility: HOSPITAL | Age: 63
End: 2023-02-13
Payer: COMMERCIAL

## 2023-02-13 ENCOUNTER — ANESTHESIA (OUTPATIENT)
Dept: GASTROENTEROLOGY | Facility: HOSPITAL | Age: 63
End: 2023-02-13
Payer: COMMERCIAL

## 2023-02-13 VITALS
WEIGHT: 186 LBS | TEMPERATURE: 97.3 F | DIASTOLIC BLOOD PRESSURE: 98 MMHG | BODY MASS INDEX: 29.19 KG/M2 | HEIGHT: 67 IN | RESPIRATION RATE: 18 BRPM | OXYGEN SATURATION: 99 % | HEART RATE: 67 BPM | SYSTOLIC BLOOD PRESSURE: 137 MMHG

## 2023-02-13 DIAGNOSIS — D37.3 LOW GRADE MUCINOUS NEOPLASM OF APPENDIX: ICD-10-CM

## 2023-02-13 DIAGNOSIS — Z12.11 ENCOUNTER FOR SCREENING FOR MALIGNANT NEOPLASM OF COLON: ICD-10-CM

## 2023-02-13 PROCEDURE — 25010000002 PROPOFOL 10 MG/ML EMULSION: Performed by: NURSE ANESTHETIST, CERTIFIED REGISTERED

## 2023-02-13 PROCEDURE — 45378 DIAGNOSTIC COLONOSCOPY: CPT | Performed by: INTERNAL MEDICINE

## 2023-02-13 RX ORDER — PROPOFOL 10 MG/ML
VIAL (ML) INTRAVENOUS AS NEEDED
Status: DISCONTINUED | OUTPATIENT
Start: 2023-02-13 | End: 2023-02-13 | Stop reason: SURG

## 2023-02-13 RX ORDER — LIDOCAINE HYDROCHLORIDE 20 MG/ML
INJECTION, SOLUTION EPIDURAL; INFILTRATION; INTRACAUDAL; PERINEURAL AS NEEDED
Status: DISCONTINUED | OUTPATIENT
Start: 2023-02-13 | End: 2023-02-13 | Stop reason: SURG

## 2023-02-13 RX ORDER — SODIUM CHLORIDE 0.9 % (FLUSH) 0.9 %
10 SYRINGE (ML) INJECTION AS NEEDED
Status: DISCONTINUED | OUTPATIENT
Start: 2023-02-13 | End: 2023-02-13 | Stop reason: HOSPADM

## 2023-02-13 RX ORDER — LIDOCAINE HYDROCHLORIDE 10 MG/ML
0.5 INJECTION, SOLUTION EPIDURAL; INFILTRATION; INTRACAUDAL; PERINEURAL ONCE AS NEEDED
Status: DISCONTINUED | OUTPATIENT
Start: 2023-02-13 | End: 2023-02-13 | Stop reason: HOSPADM

## 2023-02-13 RX ORDER — SODIUM CHLORIDE 9 MG/ML
500 INJECTION, SOLUTION INTRAVENOUS CONTINUOUS PRN
Status: DISCONTINUED | OUTPATIENT
Start: 2023-02-13 | End: 2023-02-13 | Stop reason: HOSPADM

## 2023-02-13 RX ADMIN — LIDOCAINE HYDROCHLORIDE 100 MG: 20 INJECTION, SOLUTION EPIDURAL; INFILTRATION; INTRACAUDAL; PERINEURAL at 10:20

## 2023-02-13 RX ADMIN — PROPOFOL INJECTABLE EMULSION 30 MG: 10 INJECTION, EMULSION INTRAVENOUS at 10:23

## 2023-02-13 RX ADMIN — PROPOFOL INJECTABLE EMULSION 30 MG: 10 INJECTION, EMULSION INTRAVENOUS at 10:26

## 2023-02-13 RX ADMIN — PROPOFOL INJECTABLE EMULSION 40 MG: 10 INJECTION, EMULSION INTRAVENOUS at 10:21

## 2023-02-13 RX ADMIN — SODIUM CHLORIDE 500 ML: 9 INJECTION, SOLUTION INTRAVENOUS at 10:08

## 2023-02-13 RX ADMIN — PROPOFOL INJECTABLE EMULSION 30 MG: 10 INJECTION, EMULSION INTRAVENOUS at 10:29

## 2023-02-13 RX ADMIN — PROPOFOL INJECTABLE EMULSION 70 MG: 10 INJECTION, EMULSION INTRAVENOUS at 10:20

## 2023-02-13 RX ADMIN — PROPOFOL INJECTABLE EMULSION 30 MG: 10 INJECTION, EMULSION INTRAVENOUS at 10:22

## 2023-02-13 NOTE — ANESTHESIA PREPROCEDURE EVALUATION
Anesthesia Evaluation     Patient summary reviewed   no history of anesthetic complications:  NPO Solid Status: > 8 hours             Airway   Mallampati: II  Dental      Pulmonary - negative pulmonary ROS   Cardiovascular   Exercise tolerance: excellent (>7 METS)    (+) hypertension, CAD, CABG (2021), hyperlipidemia,   (-) angina, cardiac stents      Neuro/Psych- negative ROS  GI/Hepatic/Renal/Endo - negative ROS     Musculoskeletal     Abdominal    Substance History      OB/GYN          Other                        Anesthesia Plan    ASA 3     MAC       Anesthetic plan, risks, benefits, and alternatives have been provided, discussed and informed consent has been obtained with: patient.        CODE STATUS:

## 2023-02-13 NOTE — ANESTHESIA POSTPROCEDURE EVALUATION
Patient: Logan Mayen    Procedure Summary     Date: 02/13/23 Room / Location: [facility] ENDOSCOPY 4 / [facility]    Anesthesia Start: 1013 Anesthesia Stop: 1035    Procedure: COLONOSCOPY WITH ANESTHESIA Diagnosis:       Encounter for screening for malignant neoplasm of colon      Low grade mucinous neoplasm of appendix      (Encounter for screening for malignant neoplasm of colon [Z12.11])      (Low grade mucinous neoplasm of appendix [D37.3])    Surgeons: Allan Funes DO Provider: Flo Holcomb CRNA    Anesthesia Type: MAC ASA Status: 3          Anesthesia Type: MAC    Vitals  Vitals Value Taken Time   BP 93/63 02/13/23 1036   Temp     Pulse 71 02/13/23 1039   Resp 17 02/13/23 1036   SpO2 98 % 02/13/23 1039   Vitals shown include unvalidated device data.        Post Anesthesia Care and Evaluation    Patient location during evaluation: PHASE II  Patient participation: complete - patient participated  Level of consciousness: awake  Pain score: 0  Pain management: adequate    Airway patency: patent  Anesthetic complications: No anesthetic complications  PONV Status: none  Cardiovascular status: acceptable  Respiratory status: acceptable  Hydration status: acceptable

## 2023-02-14 ENCOUNTER — TELEPHONE (OUTPATIENT)
Dept: GASTROENTEROLOGY | Facility: CLINIC | Age: 63
End: 2023-02-14
Payer: COMMERCIAL

## 2023-02-27 RX ORDER — CLOPIDOGREL BISULFATE 75 MG/1
TABLET ORAL
Qty: 30 TABLET | Refills: 0 | Status: SHIPPED | OUTPATIENT
Start: 2023-02-27

## 2023-04-08 RX ORDER — CLOPIDOGREL BISULFATE 75 MG/1
75 TABLET ORAL DAILY
Qty: 90 TABLET | Refills: 3 | Status: SHIPPED | OUTPATIENT
Start: 2023-04-08

## 2023-12-04 RX ORDER — AMLODIPINE BESYLATE 5 MG/1
5 TABLET ORAL DAILY
Qty: 90 TABLET | Refills: 3 | Status: SHIPPED | OUTPATIENT
Start: 2023-12-04

## (undated) DEVICE — PATIENT RETURN ELECTRODE, SINGLE-USE, CONTACT QUALITY MONITORING, ADULT, WITH 9FT CORD, FOR PATIENTS WEIGING OVER 33LBS. (15KG): Brand: MEGADYNE

## (undated) DEVICE — MODEL AT P65, P/N 701554-001KIT CONTENTS: HAND CONTROLLER, 3-WAY HIGH-PRESSURE STOPCOCK WITH ROTATING END AND PREMIUM HIGH-PRESSURE TUBING: Brand: ANGIOTOUCH® KIT

## (undated) DEVICE — GLV SURG BIOGEL LTX PF 6 1/2

## (undated) DEVICE — TOOL INSRT GW MTL OR PLSTC

## (undated) DEVICE — DUAL CUT SAGITTAL BLADE

## (undated) DEVICE — GLV SURG TRIUMPH MICRO PF LTX 7.5 STRL

## (undated) DEVICE — PAD, DEFIB, ADULT, RADIOTRANS, PHILIPS: Brand: MEDLINE

## (undated) DEVICE — 2, DISPOSABLE SUCTION/IRRIGATOR WITHOUT DISPOSABLE TIP: Brand: STRYKEFLOW

## (undated) DEVICE — GOWN,PRECEPT,XLNG/XXLARGE,STRL: Brand: MEDLINE

## (undated) DEVICE — GLV SURG TRIUMPH MICRO PF LTX 8.5 STRL

## (undated) DEVICE — BLOWER MISTER CLEARVIEW W/TBG

## (undated) DEVICE — CLTH CLENS READYCLEANSE PERI CARE PK/5

## (undated) DEVICE — GAUZE,SPONGE,4"X4",16PLY,XRAY,STRL,LF: Brand: MEDLINE

## (undated) DEVICE — E-PACK PROCEDURE KIT: Brand: E-PACK

## (undated) DEVICE — MASK,OXYGEN,MED CONC,ADLT,7' TUB, UC: Brand: PENDING

## (undated) DEVICE — SUT VIC 4/0 P3 18IN UD VCP494H

## (undated) DEVICE — COPILOT BLEEDBACK CONTROL VALVE: Brand: COPILOT

## (undated) DEVICE — MINI TREK CORONARY DILATATION CATHETER 2.0 MM X 15 MM / RAPID-EXCHANGE: Brand: MINI TREK

## (undated) DEVICE — SYS VASOVIEW HEMOPRO ENDOSCOPIC HARVST VESL

## (undated) DEVICE — BIPOLAR SEALER 23-113-1 AQM 2.3 OM NEURO: Brand: AQUAMANTYS ®

## (undated) DEVICE — PK CATH CARD 30 CA/4

## (undated) DEVICE — VAGINAL PREP TRAY: Brand: MEDLINE INDUSTRIES, INC.

## (undated) DEVICE — SYR LUERLOK 50ML

## (undated) DEVICE — KT ANTI FOG W/FLD AND SPNG

## (undated) DEVICE — FR5 INFINITI MULTIPAC: Brand: INFINITI

## (undated) DEVICE — SUT VIC 3/0 RB1 27IN UD VCP215H

## (undated) DEVICE — LIGHT SUCT UNTETHERED SCINTILLANT

## (undated) DEVICE — NON-WOVEN ADHESIVE WOUND DRESSING: Brand: PRIMAPORE ADHESIVE DRESSING 30*10CM

## (undated) DEVICE — CANN CO2/O2 NASL A/

## (undated) DEVICE — 1/2 FORCE SURGICAL SPRING CLIP: Brand: STEALTH® SPRING CLIP

## (undated) DEVICE — PK OPN HEART 30

## (undated) DEVICE — RESERVOIR,SUCTION,100CC,SILICONE: Brand: MEDLINE

## (undated) DEVICE — STRIP CLS WND CURAD MEDI/STRIP HYPOALLERG 0.25X4IN PK/10

## (undated) DEVICE — GW STARTER FXD CORE J .035 3X150CM 3MM

## (undated) DEVICE — ENDOPATH XCEL BLUNT TIP TROCARS WITH SMOOTH SLEEVES: Brand: ENDOPATH XCEL

## (undated) DEVICE — Z INACTIVE USE 2660664 SOLUTION IRRIG 3000ML 0.9% SOD CHL USP UROMATIC PLAS CONT

## (undated) DEVICE — SOLIDIFIER LIQUI LOC PLUS 2000CC

## (undated) DEVICE — SYS DISTNTION VEIN BONCHEK 300MMHG

## (undated) DEVICE — SUTURE VCRL SZ 2-0 L36IN ABSRB UD L36MM CT-1 1/2 CIR J945H

## (undated) DEVICE — TRAY EPI 25GA L3.5IN 0.75% BIPIVCAIN 8.25% D CONTAIN BPA

## (undated) DEVICE — PINNACLE INTRODUCER SHEATH: Brand: PINNACLE

## (undated) DEVICE — PACK ANT HIP CDS

## (undated) DEVICE — BLAKE SILICONE DRAINS CARDIO CONNECTOR 2:1: Brand: BLAKE

## (undated) DEVICE — INFLATION DEVICE: Brand: ENCORE™ 26

## (undated) DEVICE — THE CHANNEL CLEANING BRUSH IS A NYLON FLEXI BRUSH ATTACHED TO A FLEXIBLE PLASTIC SHEATH DESIGNED TO SAFELY REMOVE DEBRIS FROM FLEXIBLE ENDOSCOPES.

## (undated) DEVICE — 3M™ IOBAN™ 2 ANTIMICROBIAL INCISE DRAPE 6650EZ: Brand: IOBAN™ 2

## (undated) DEVICE — BAPTIST TURNOVER KIT: Brand: MEDLINE INDUSTRIES, INC.

## (undated) DEVICE — DRAIN,WOUND,ROUND,24FR,5/16",FULL-FLUTED: Brand: MEDLINE

## (undated) DEVICE — BLAKE SILICONE DRAIN, 19 FR ROUND, HUBLESS WITH 1/4" BENDABLE TROCAR: Brand: BLAKE

## (undated) DEVICE — CHLORAPREP 26ML ORANGE

## (undated) DEVICE — DEV TORQ GW HOT/PINK

## (undated) DEVICE — SUTURE VCRL SZ 0 L27IN ABSRB UD L36MM CT-1 1/2 CIR J260H

## (undated) DEVICE — 6.3MM ROUND FAST CUTTING BUR

## (undated) DEVICE — SYSTEM SKIN CLSR 22CM DERMBND PRINEO

## (undated) DEVICE — PAD LAP CHOLE: Brand: MEDLINE INDUSTRIES, INC.

## (undated) DEVICE — STERILE POLYISOPRENE POWDER-FREE SURGICAL GLOVES: Brand: PROTEXIS

## (undated) DEVICE — TRAP FLD MINIVAC MEGADYNE 100ML

## (undated) DEVICE — HI-TORQUE POWERTURN FLEX GUIDE WIRE W/HYDROPHILIC COATING .014" STRAIGHT TIP 190 CM: Brand: HI-TORQUE POWERTURN

## (undated) DEVICE — PK TURNOVER RM ADV

## (undated) DEVICE — SUTURE VCRL SZ 3-0 L27IN ABSRB UD L26MM SH 1/2 CIR J416H

## (undated) DEVICE — GC 7F 078 JL 4: Brand: VISTA BRITE TIP

## (undated) DEVICE — SENSR O2 OXIMAX FNGR A/ 18IN NONSTR

## (undated) DEVICE — STERNUM BLADE, OFFSET (32.0 X 0.8 X 6.3MM)

## (undated) DEVICE — ELECTRD DEFIB M/FUNC GEL LIQ RL PROPADZ

## (undated) DEVICE — YANKAUER,BULB TIP WITH VENT: Brand: ARGYLE

## (undated) DEVICE — PENCL ES MEGADINE EZ/CLEAN BUTN W/HOLSTR 10FT

## (undated) DEVICE — SUTURE ABSRB BRAID COAT UD CP NO 2 27IN VCRL J195H

## (undated) DEVICE — DRSNG PRESS SAFEGUARD

## (undated) DEVICE — GLOVE SURG SZ 75 L12IN FNGR THK79MIL GRN LTX FREE

## (undated) DEVICE — PK SET UP ANES OPN HEART 30

## (undated) DEVICE — MONOPOLAR METZENBAUM SCISSOR, MINI BLADE TIP, DISPOSABLE: Brand: MONOPOLAR METZENBAUM SCISSOR, MINI BLADE TIP, DISPOSABLE

## (undated) DEVICE — PTCH HEMOCON PRO 2X2IN

## (undated) DEVICE — ROTATING SURGICAL PUNCHES, 1 PER POUCH: Brand: A&E MEDICAL / ROTATING SURGICAL PUNCHES

## (undated) DEVICE — SOLUTION IV 250ML 0.9% SOD CHL PH 5 INJ USP VIAFLX PLAS

## (undated) DEVICE — SUT VIC 0 UR6 27IN VCP603H

## (undated) DEVICE — Device: Brand: DEFENDO AIR/WATER/SUCTION AND BIOPSY VALVE

## (undated) DEVICE — SUT SILK 2/0 FS BLK 18IN 685G

## (undated) DEVICE — 4-PORT MANIFOLD: Brand: NEPTUNE 2

## (undated) DEVICE — MYNXGRIP 6F/7F: Brand: MYNXGRIP

## (undated) DEVICE — THE ECHELON FLEX POWERED PLUS ARTICULATING ENDOSCOPIC LINEAR CUTTERS ARE STERILE, SINGLE PATIENT USE INSTRUMENTS THAT SIMULTANEOUSLYCUT AND STAPLE TISSUE. THERE ARE SIX STAGGERED ROWS OF STAPLES, THREE ON EITHER SIDE OF THE CUT LINE. THE ECHELON FLEX 45 POWERED PLUSINSTRUMENTS HAVE A STAPLE LINE THAT IS APPROXIMATELY 45 MM LONG AND A CUT LINE THAT IS APPROXIMATELY 42 MM LONG. THE SHAFT CAN ROTATE FREELYIN BOTH DIRECTIONS AND AN ARTICULATION MECHANISM ENABLES THE DISTAL PORTION OF THE SHAFT TO PIVOT TO FACILITATE LATERAL ACCESS TO THE OPERATIVESITE.THE INSTRUMENTS ARE PACKAGED WITH A PRIMARY LITHIUM BATTERY PACK THAT MUST BE INSTALLED PRIOR TO USE. THERE ARE SPECIFIC REQUIREMENTS FORDISPOSING OF THE BATTERY PACK. REFER TO THE BATTERY PACK DISPOSAL SECTION.THE INSTRUMENTS ARE PACKAGED WITHOUT A RELOAD AND MUST BE LOADED PRIOR TO USE. A STAPLE RETAINING CAP ON THE RELOAD PROTECTS THE STAPLE LEGPOINTS DURING SHIPPING AND TRANSPORTATION. THE INSTRUMENTS’ LOCK-OUT FEATURE IS DESIGNED TO PREVENT A USED OR IMPROPERLY INSTALLED RELOADFROM BEING REFIRED OR AN INSTRUMENT FROM BEING FIRED WITHOUT A RELOAD.: Brand: ECHELON FLEX

## (undated) DEVICE — TOTAL TRAY, 16FR 10ML SIL FOLEY, URN: Brand: MEDLINE

## (undated) DEVICE — GLOVE SURG SZ 85 L12IN FNGR THK13MIL BRN LTX SYN POLYMER W

## (undated) DEVICE — SOLUTION IV IRRIG POUR BRL 0.9% SODIUM CHL 2F7124

## (undated) DEVICE — FAN SPRAY KIT: Brand: PULSAVAC®

## (undated) DEVICE — COVER POS PERINL POST NS 082501

## (undated) DEVICE — GLV SURG BIOGEL LTX PF 7 1/2

## (undated) DEVICE — SOL IRR NACL 0.9PCT BT 1000ML

## (undated) DEVICE — GLOVE SURG SZ 85 L12IN FNGR THK94MIL TRNSLUC YEL LTX

## (undated) DEVICE — MODEL BT2000 P/N 700287-012KIT CONTENTS: MANIFOLD WITH SALINE AND CONTRAST PORTS, SALINE TUBING WITH SPIKE AND HAND SYRINGE, TRANSDUCER: Brand: BT2000 AUTOMATED MANIFOLD KIT